# Patient Record
Sex: MALE | Race: WHITE | NOT HISPANIC OR LATINO | URBAN - METROPOLITAN AREA
[De-identification: names, ages, dates, MRNs, and addresses within clinical notes are randomized per-mention and may not be internally consistent; named-entity substitution may affect disease eponyms.]

---

## 2017-04-03 ENCOUNTER — OFFICE (AMBULATORY)
Dept: URBAN - METROPOLITAN AREA CLINIC 64 | Facility: CLINIC | Age: 52
End: 2017-04-03

## 2017-04-03 VITALS
WEIGHT: 173 LBS | DIASTOLIC BLOOD PRESSURE: 71 MMHG | SYSTOLIC BLOOD PRESSURE: 112 MMHG | HEIGHT: 68 IN | HEART RATE: 67 BPM

## 2017-04-03 DIAGNOSIS — K62.9 DISEASE OF ANUS AND RECTUM, UNSPECIFIED: ICD-10-CM

## 2017-04-03 DIAGNOSIS — Z12.11 ENCOUNTER FOR SCREENING FOR MALIGNANT NEOPLASM OF COLON: ICD-10-CM

## 2017-04-03 DIAGNOSIS — R13.10 DYSPHAGIA, UNSPECIFIED: ICD-10-CM

## 2017-04-03 PROCEDURE — 99203 OFFICE O/P NEW LOW 30 MIN: CPT | Performed by: INTERNAL MEDICINE

## 2017-05-01 ENCOUNTER — ON CAMPUS - OUTPATIENT (AMBULATORY)
Dept: URBAN - METROPOLITAN AREA HOSPITAL 2 | Facility: HOSPITAL | Age: 52
End: 2017-05-01
Payer: COMMERCIAL

## 2017-05-01 ENCOUNTER — OFFICE (AMBULATORY)
Dept: URBAN - METROPOLITAN AREA CLINIC 64 | Facility: CLINIC | Age: 52
End: 2017-05-01

## 2017-05-01 VITALS
SYSTOLIC BLOOD PRESSURE: 96 MMHG | SYSTOLIC BLOOD PRESSURE: 103 MMHG | HEART RATE: 74 BPM | HEART RATE: 81 BPM | HEART RATE: 78 BPM | HEIGHT: 68 IN | DIASTOLIC BLOOD PRESSURE: 68 MMHG | OXYGEN SATURATION: 100 % | DIASTOLIC BLOOD PRESSURE: 54 MMHG | DIASTOLIC BLOOD PRESSURE: 61 MMHG | DIASTOLIC BLOOD PRESSURE: 56 MMHG | SYSTOLIC BLOOD PRESSURE: 114 MMHG | DIASTOLIC BLOOD PRESSURE: 58 MMHG | HEART RATE: 71 BPM | OXYGEN SATURATION: 99 % | SYSTOLIC BLOOD PRESSURE: 95 MMHG | WEIGHT: 171 LBS | HEART RATE: 66 BPM | SYSTOLIC BLOOD PRESSURE: 126 MMHG | SYSTOLIC BLOOD PRESSURE: 109 MMHG | TEMPERATURE: 97.5 F | DIASTOLIC BLOOD PRESSURE: 59 MMHG | SYSTOLIC BLOOD PRESSURE: 98 MMHG | OXYGEN SATURATION: 95 % | SYSTOLIC BLOOD PRESSURE: 105 MMHG | HEART RATE: 59 BPM | HEART RATE: 76 BPM | HEART RATE: 64 BPM | DIASTOLIC BLOOD PRESSURE: 64 MMHG | RESPIRATION RATE: 16 BRPM | DIASTOLIC BLOOD PRESSURE: 78 MMHG | SYSTOLIC BLOOD PRESSURE: 106 MMHG | OXYGEN SATURATION: 94 %

## 2017-05-01 DIAGNOSIS — D12.3 BENIGN NEOPLASM OF TRANSVERSE COLON: ICD-10-CM

## 2017-05-01 DIAGNOSIS — K64.4 RESIDUAL HEMORRHOIDAL SKIN TAGS: ICD-10-CM

## 2017-05-01 DIAGNOSIS — R13.10 DYSPHAGIA, UNSPECIFIED: ICD-10-CM

## 2017-05-01 DIAGNOSIS — Z12.11 ENCOUNTER FOR SCREENING FOR MALIGNANT NEOPLASM OF COLON: ICD-10-CM

## 2017-05-01 DIAGNOSIS — K31.89 OTHER DISEASES OF STOMACH AND DUODENUM: ICD-10-CM

## 2017-05-01 DIAGNOSIS — K31.9 DISEASE OF STOMACH AND DUODENUM, UNSPECIFIED: ICD-10-CM

## 2017-05-01 DIAGNOSIS — K20.9 ESOPHAGITIS, UNSPECIFIED: ICD-10-CM

## 2017-05-01 PROBLEM — K29.70 GASTRITIS, UNSPECIFIED, WITHOUT BLEEDING: Status: ACTIVE | Noted: 2017-05-01

## 2017-05-01 PROBLEM — D12.4 BENIGN NEOPLASM OF DESCENDING COLON: Status: ACTIVE | Noted: 2017-05-01

## 2017-05-01 LAB
GI HISTOLOGY: A. SELECT: (no result)
GI HISTOLOGY: B. UNSPECIFIED: (no result)
GI HISTOLOGY: PDF REPORT: (no result)

## 2017-05-01 PROCEDURE — 43450 DILATE ESOPHAGUS 1/MULT PASS: CPT | Performed by: INTERNAL MEDICINE

## 2017-05-01 PROCEDURE — 88305 TISSUE EXAM BY PATHOLOGIST: CPT | Performed by: INTERNAL MEDICINE

## 2017-05-01 PROCEDURE — 43239 EGD BIOPSY SINGLE/MULTIPLE: CPT | Performed by: INTERNAL MEDICINE

## 2017-05-01 PROCEDURE — 45385 COLONOSCOPY W/LESION REMOVAL: CPT | Mod: 33 | Performed by: INTERNAL MEDICINE

## 2017-05-01 RX ORDER — OMEPRAZOLE 40 MG/1
40 CAPSULE, DELAYED RELEASE ORAL
Qty: 28 | Refills: 3 | Status: ACTIVE
Start: 2017-05-01

## 2017-05-01 RX ADMIN — PROPOFOL: 10 INJECTION, EMULSION INTRAVENOUS at 10:57

## 2017-08-29 ENCOUNTER — HOSPITAL ENCOUNTER (OUTPATIENT)
Dept: PREADMISSION TESTING | Facility: HOSPITAL | Age: 52
Discharge: HOME | End: 2017-08-29
Attending: ORTHOPAEDIC SURGERY | Admitting: ORTHOPAEDIC SURGERY

## 2017-08-29 LAB
ANION GAP SERPL CALC-SCNC: 11.9 MMOL/L (ref 10–20)
BACTERIA SPEC AEROBE CULT: NORMAL
BASOPHILS # BLD AUTO: 0 10*3/UL (ref 0–0.2)
BASOPHILS NFR BLD AUTO: 0 % (ref 0–2)
BUN SERPL-MCNC: 9 MG/DL (ref 8–20)
BUN/CREAT SERPL: 9 (ref 6.2–20.3)
CALCIUM SERPL-MCNC: 9.6 MG/DL (ref 8.9–10.3)
CHLORIDE SERPL-SCNC: 104 MMOL/L (ref 101–111)
CONV CO2: 27 MMOL/L (ref 22–32)
CREAT UR-MCNC: 1 MG/DL (ref 0.7–1.2)
DIFFERENTIAL METHOD BLD: (no result)
EOSINOPHIL # BLD AUTO: 0.1 10*3/UL (ref 0–0.3)
EOSINOPHIL # BLD AUTO: 2 % (ref 0–3)
ERYTHROCYTE [DISTWIDTH] IN BLOOD BY AUTOMATED COUNT: 12.7 % (ref 11.5–14.5)
GLUCOSE SERPL-MCNC: 121 MG/DL (ref 65–99)
HCT VFR BLD AUTO: 44 % (ref 40–54)
HGB BLD-MCNC: 15.2 G/DL (ref 14–18)
LYMPHOCYTES # BLD AUTO: 1.4 10*3/UL (ref 0.8–4.8)
LYMPHOCYTES NFR BLD AUTO: 26 % (ref 18–42)
Lab: NORMAL
MCH RBC QN AUTO: 33.2 PG (ref 26–32)
MCHC RBC AUTO-ENTMCNC: 34.5 G/DL (ref 32–36)
MCV RBC AUTO: 96.3 FL (ref 80–94)
MICRO REPORT STATUS: NORMAL
MONOCYTES # BLD AUTO: 0.5 10*3/UL (ref 0.1–1.3)
MONOCYTES NFR BLD AUTO: 10 % (ref 2–11)
NEUTROPHILS # BLD AUTO: 3.1 10*3/UL (ref 2.3–8.6)
NEUTROPHILS NFR BLD AUTO: 62 % (ref 50–75)
NRBC BLD AUTO-RTO: 0 /100{WBCS}
NRBC/RBC NFR BLD MANUAL: 0 10*3/UL
PLATELET # BLD AUTO: 184 10*3/UL (ref 150–450)
PMV BLD AUTO: 7.1 FL (ref 7.4–10.4)
POTASSIUM SERPL-SCNC: 3.9 MMOL/L (ref 3.6–5.1)
RBC # BLD AUTO: 4.57 10*6/UL (ref 4.6–6)
SODIUM SERPL-SCNC: 139 MMOL/L (ref 136–144)
SPECIMEN SOURCE: NORMAL
WBC # BLD AUTO: 5.1 10*3/UL (ref 4.5–11.5)

## 2017-09-05 ENCOUNTER — HOSPITAL ENCOUNTER (OUTPATIENT)
Dept: PREOP | Facility: HOSPITAL | Age: 52
Setting detail: HOSPITAL OUTPATIENT SURGERY
Discharge: HOME | End: 2017-09-05
Attending: ORTHOPAEDIC SURGERY | Admitting: ORTHOPAEDIC SURGERY

## 2018-08-10 ENCOUNTER — HOSPITAL ENCOUNTER (OUTPATIENT)
Dept: GENERAL RADIOLOGY | Facility: HOSPITAL | Age: 53
Discharge: HOME | End: 2018-08-10
Attending: FAMILY MEDICINE | Admitting: FAMILY MEDICINE

## 2021-01-08 ENCOUNTER — TELEPHONE (OUTPATIENT)
Dept: FAMILY MEDICINE CLINIC | Facility: CLINIC | Age: 56
End: 2021-01-08

## 2021-01-08 NOTE — TELEPHONE ENCOUNTER
Patient called and said that he was very desperate as he states that  he has a very inflamed Hemorid that is really bothering him.  Patient has not been seen since 4/2019 and I advised him that we would not be able to fill any scripts and or send inn any medications but I told him I would talk to li vallejo and see what he felt would be the best thing to do.     After talking to Dr. Vallejo he felt that him doing the Preparation H, doing a SITS bath ( warm to hot water with some Epson salt in it ) a few times a day and alternating sitting and standing while at work was going to be his bets options as he states that he has to sit for long periods of time while at work.     He said that he was leaving work now to go get this stuff and go home and try and rest. I told him to try and keep as much pressure off his bottom as he could and I told him that if it got to the point that he could not tolerate it anymore then to go to the urgent care and or ER.

## 2021-01-11 ENCOUNTER — OFFICE VISIT (OUTPATIENT)
Dept: FAMILY MEDICINE CLINIC | Facility: CLINIC | Age: 56
End: 2021-01-11

## 2021-01-11 VITALS
TEMPERATURE: 98.2 F | BODY MASS INDEX: 28.22 KG/M2 | WEIGHT: 186.2 LBS | OXYGEN SATURATION: 99 % | SYSTOLIC BLOOD PRESSURE: 110 MMHG | DIASTOLIC BLOOD PRESSURE: 62 MMHG | HEART RATE: 78 BPM | RESPIRATION RATE: 16 BRPM | HEIGHT: 68 IN

## 2021-01-11 DIAGNOSIS — K64.9 HEMORRHOIDS, UNSPECIFIED HEMORRHOID TYPE: Primary | ICD-10-CM

## 2021-01-11 DIAGNOSIS — E66.3 OVERWEIGHT WITH BODY MASS INDEX (BMI) OF 28 TO 28.9 IN ADULT: ICD-10-CM

## 2021-01-11 PROBLEM — Z47.89 ORTHOPEDIC AFTERCARE: Status: ACTIVE | Noted: 2017-09-07

## 2021-01-11 PROBLEM — M75.100 NONTRAUMATIC TEAR OF ROTATOR CUFF: Status: ACTIVE | Noted: 2017-04-26

## 2021-01-11 PROBLEM — M19.011 DEGENERATIVE JOINT DISEASE OF RIGHT ACROMIOCLAVICULAR JOINT: Status: ACTIVE | Noted: 2017-04-26

## 2021-01-11 PROBLEM — S83.242D OTHER TEAR OF MEDIAL MENISCUS, CURRENT INJURY, LEFT KNEE, SUBSEQUENT ENCOUNTER: Status: ACTIVE | Noted: 2017-08-07

## 2021-01-11 PROCEDURE — 99213 OFFICE O/P EST LOW 20 MIN: CPT | Performed by: FAMILY MEDICINE

## 2021-01-11 RX ORDER — HYDROCORTISONE ACETATE PRAMOXINE HCL 2.5; 1 G/100G; G/100G
CREAM TOPICAL 3 TIMES DAILY
Qty: 30 G | Refills: 2 | Status: SHIPPED | OUTPATIENT
Start: 2021-01-11 | End: 2021-05-03

## 2021-01-11 NOTE — PROGRESS NOTES
Subjective   Jean-Claude Clifford Jr. is a 55 y.o. male.     Chief Complaint   Patient presents with   • Hemorrhoids       Patient states he's had a hemorrhoid for about four years but since last Thursday he's noticed it's grown larger and has become more irritated.  He denies any blood in stools.    Hemorrhoids  This is a chronic problem. The current episode started more than 1 year ago. The problem has been rapidly worsening. Associated symptoms include a change in bowel habit. Pertinent negatives include no abdominal pain, chest pain, chills, diaphoresis, nausea, neck pain, rash or visual change. The symptoms are aggravated by standing, walking, bending and coughing. He has tried rest and relaxation for the symptoms. The treatment provided no relief.            I personally reviewed and updated the patient's allergies, medications, problem list, and past medical, surgical, social, and family history. I have reviewed and confirmed the accuracy of the History of Present Illness and Review of Symptoms as documented by the MA/LPN/RN. Adam Sanchez MD    History reviewed. No pertinent family history.    Social History     Tobacco Use   • Smoking status: Never Smoker   • Smokeless tobacco: Never Used   Substance Use Topics   • Alcohol use: No     Frequency: Never   • Drug use: Not on file       History reviewed. No pertinent surgical history.    Patient Active Problem List   Diagnosis   • Degenerative joint disease of right acromioclavicular joint   • Nontraumatic tear of rotator cuff   • Other tear of medial meniscus, current injury, left knee, subsequent encounter   • Orthopedic aftercare   • Hemorrhoids   • Overweight with body mass index (BMI) of 28 to 28.9 in adult         Current Outpatient Medications:   •  Multiple Vitamins-Minerals (MULTIVITAMIN ADULTS 50+ PO), Take  by mouth., Disp: , Rfl:   •  Hydrocort-Pramoxine, Perianal, (ANALPRAM-HC) 2.5-1 % rectal cream, Insert  into the rectum 3 (Three) Times a Day.  "2-4 times daily, as needed or after each Bowel Movement, Disp: 30 g, Rfl: 2         Review of Systems   Constitutional: Negative for chills and diaphoresis.   Eyes: Negative for visual disturbance.   Respiratory: Negative for shortness of breath.    Cardiovascular: Negative for chest pain and palpitations.   Gastrointestinal: Positive for change in bowel habit and hemorrhoids. Negative for abdominal pain and nausea.   Endocrine: Negative for polydipsia and polyphagia.   Musculoskeletal: Negative for neck pain and neck stiffness.   Skin: Negative for color change, pallor and rash.   Neurological: Negative for seizures and syncope.   Hematological: Negative for adenopathy.       I have reviewed and confirmed the accuracy of the ROS as documented by the MA/LPN/RN Adam Sanchez MD      Objective   /62   Pulse 78   Temp 98.2 °F (36.8 °C)   Resp 16   Ht 172.7 cm (68\")   Wt 84.5 kg (186 lb 3.2 oz)   SpO2 99%   BMI 28.31 kg/m²   BP Readings from Last 3 Encounters:   01/11/21 110/62   10/25/19 153/73   02/28/19 143/88     Wt Readings from Last 3 Encounters:   01/11/21 84.5 kg (186 lb 3.2 oz)   10/25/19 79.4 kg (175 lb)   02/28/19 86.4 kg (190 lb 6.4 oz)     Physical Exam  Constitutional:       Appearance: Normal appearance. He is well-developed. He is not diaphoretic.   Cardiovascular:      Rate and Rhythm: Normal rate and regular rhythm.      Pulses: Normal pulses.      Heart sounds: Normal heart sounds, S1 normal and S2 normal. No murmur. No friction rub. No gallop.    Pulmonary:      Effort: Pulmonary effort is normal. No accessory muscle usage.      Breath sounds: Normal breath sounds. No stridor. No decreased breath sounds, wheezing, rhonchi or rales.   Abdominal:      General: Bowel sounds are normal. There is no distension.      Palpations: Abdomen is soft. Abdomen is not rigid. There is no mass or pulsatile mass.      Tenderness: There is no abdominal tenderness. There is no guarding or rebound. " Negative signs include Dyer's sign.      Hernia: No hernia is present. There is no hernia in the left inguinal area.   Genitourinary:     Penis: Normal.       Scrotum/Testes: Normal.         Right: Mass, tenderness or swelling not present.         Left: Mass, tenderness or swelling not present.      Prostate: Enlarged: mild, symmetric, no nodules.      Rectum: External hemorrhoid present. No mass, tenderness or anal fissure.   Lymphadenopathy:      Lower Body: No right inguinal adenopathy. No left inguinal adenopathy.   Skin:     General: Skin is warm and dry.      Coloration: Skin is not pale.   Neurological:      Mental Status: He is alert and oriented to person, place, and time.      Coordination: Coordination normal.      Gait: Gait normal.         Data / Lab Results:    No results found for: HGBA1C     No results found for: LDL, LDLDIRECT  No results found for: CHOL  No results found for: TRIG  No results found for: HDL  No results found for: PSA  Lab Results   Component Value Date    WBC 5.1 08/29/2017    HGB 15.2 08/29/2017    HCT 44.0 08/29/2017    MCV 96.3 (H) 08/29/2017     08/29/2017     No results found for: TSH, M6DHAOJ, I5APHJA   Lab Results   Component Value Date    GLUCOSE 121 (H) 08/29/2017    BUN 9 08/29/2017    CREATININE 1.0 08/29/2017    BCR 9.0 08/29/2017    K 3.9 08/29/2017    CO2 27 08/29/2017    CALCIUM 9.6 08/29/2017     No results found for: MALU, RF, SEDRATE   No results found for: CRP   No results found for: IRON, TIBC, FERRITIN   No results found for: [id number]       Assessment/Plan      Medications        Problem List         LOS    Hemorrhoid.  Start topical hydrocortisone, sitz bath/Tucks pads, NSAIDs as needed.  Coccyx pillow as needed.  Topical care discussed.  Longstanding history, consider colorectal surgery referral if persistent symptoms.  Increase fluids/fiber.      Diagnoses and all orders for this visit:    1. Hemorrhoids, unspecified hemorrhoid type (Primary)  -      Hydrocort-Pramoxine, Perianal, (ANALPRAM-HC) 2.5-1 % rectal cream; Insert  into the rectum 3 (Three) Times a Day. 2-4 times daily, as needed or after each Bowel Movement  Dispense: 30 g; Refill: 2    2. Overweight with body mass index (BMI) of 28 to 28.9 in adult              Expected course, medications, and adverse effects discussed.  Call or return if worsening or persistent symptoms.  I wore protective equipment throughout this patient encounter including a mask, gloves, and eye protection.  Hand hygiene was performed before donning protective equipment and after removal when leaving the room. The complete contents of the Assessment and Plan and Data/Lab Results as documented above have been reviewed and addressed by myself with the patient today as part of an ongoing evaluation / treatment plan.  If some of the documentation has been copied from a previous note and is unchanged it indicates that this problem / plan has been assessed today but is stable from a previous visit and no changes have been recommended.

## 2021-02-19 ENCOUNTER — OFFICE VISIT (OUTPATIENT)
Dept: FAMILY MEDICINE CLINIC | Facility: CLINIC | Age: 56
End: 2021-02-19

## 2021-02-19 ENCOUNTER — HOSPITAL ENCOUNTER (OUTPATIENT)
Dept: GENERAL RADIOLOGY | Facility: HOSPITAL | Age: 56
Discharge: HOME | End: 2021-02-19
Admitting: FAMILY MEDICINE

## 2021-02-19 ENCOUNTER — TELEPHONE (OUTPATIENT)
Dept: FAMILY MEDICINE CLINIC | Facility: CLINIC | Age: 56
End: 2021-02-19

## 2021-02-19 VITALS
HEART RATE: 82 BPM | DIASTOLIC BLOOD PRESSURE: 77 MMHG | HEIGHT: 68 IN | RESPIRATION RATE: 20 BRPM | OXYGEN SATURATION: 97 % | BODY MASS INDEX: 28.4 KG/M2 | SYSTOLIC BLOOD PRESSURE: 127 MMHG | WEIGHT: 187.4 LBS | TEMPERATURE: 97.1 F

## 2021-02-19 DIAGNOSIS — K64.9 HEMORRHOIDS, UNSPECIFIED HEMORRHOID TYPE: ICD-10-CM

## 2021-02-19 DIAGNOSIS — R06.02 SHORT OF BREATH ON EXERTION: Primary | ICD-10-CM

## 2021-02-19 DIAGNOSIS — L57.0 ACTINIC KERATOSIS: ICD-10-CM

## 2021-02-19 DIAGNOSIS — R07.9 CHEST PAIN, UNSPECIFIED TYPE: ICD-10-CM

## 2021-02-19 DIAGNOSIS — E66.3 OVERWEIGHT WITH BODY MASS INDEX (BMI) OF 28 TO 28.9 IN ADULT: ICD-10-CM

## 2021-02-19 PROCEDURE — 93000 ELECTROCARDIOGRAM COMPLETE: CPT | Performed by: FAMILY MEDICINE

## 2021-02-19 PROCEDURE — 99215 OFFICE O/P EST HI 40 MIN: CPT | Performed by: FAMILY MEDICINE

## 2021-02-19 PROCEDURE — 71046 X-RAY EXAM CHEST 2 VIEWS: CPT

## 2021-02-19 NOTE — TELEPHONE ENCOUNTER
Let him know his blood work looks good, his heart troponin is normal no sign of a heart attack, blood count and kidney function look good, chest x-ray also normal, will call again with the results of his Covid swab when the results are available, want him to proceed with the stress test next week as planned, thanks

## 2021-02-19 NOTE — PROGRESS NOTES
Subjective   Jean-Claude Clifford Jr. is a 55 y.o. male.     Chief Complaint   Patient presents with   • Shortness of Breath       Patient states when he walks a short distance he gets a tightness in his chest.    Shortness of Breath  This is a new problem. The current episode started 1 to 4 weeks ago. The problem occurs intermittently. Associated symptoms include chest pain and headaches. Pertinent negatives include no abdominal pain, fever, leg pain, neck pain, sore throat or vomiting. The symptoms are aggravated by exercise. He has tried nothing for the symptoms. The treatment provided no relief.            I personally reviewed and updated the patient's allergies, medications, problem list, and past medical, surgical, social, and family history. I have reviewed and confirmed the accuracy of the History of Present Illness and Review of Symptoms as documented by the MA/LPN/RN. Adam Sanchez MD    History reviewed. No pertinent family history.    Social History     Tobacco Use   • Smoking status: Never Smoker   • Smokeless tobacco: Never Used   Substance Use Topics   • Alcohol use: No     Frequency: Never   • Drug use: Never       History reviewed. No pertinent surgical history.    Patient Active Problem List   Diagnosis   • Degenerative joint disease of right acromioclavicular joint   • Nontraumatic tear of rotator cuff   • Other tear of medial meniscus, current injury, left knee, subsequent encounter   • Orthopedic aftercare   • Hemorrhoids   • Overweight with body mass index (BMI) of 28 to 28.9 in adult   • Short of breath on exertion   • Actinic keratosis         Current Outpatient Medications:   •  Hydrocort-Pramoxine, Perianal, (ANALPRAM-HC) 2.5-1 % rectal cream, Insert  into the rectum 3 (Three) Times a Day. 2-4 times daily, as needed or after each Bowel Movement, Disp: 30 g, Rfl: 2  •  Multiple Vitamins-Minerals (MULTIVITAMIN ADULTS 50+ PO), Take  by mouth., Disp: , Rfl:          Review of Systems  "  Constitutional: Negative for chills, diaphoresis and fever.   HENT: Negative for sore throat, trouble swallowing and voice change.    Eyes: Negative for visual disturbance.   Respiratory: Positive for shortness of breath.    Cardiovascular: Positive for chest pain. Negative for palpitations.   Gastrointestinal: Negative for abdominal pain, nausea and vomiting.   Endocrine: Negative for polydipsia and polyphagia.   Genitourinary: Negative for hematuria.   Musculoskeletal: Negative for neck pain and neck stiffness.   Skin: Negative for color change and pallor.   Allergic/Immunologic: Negative for immunocompromised state.   Neurological: Negative for seizures and syncope.   Hematological: Negative for adenopathy.   Psychiatric/Behavioral: Negative for hallucinations, sleep disturbance and suicidal ideas.       I have reviewed and confirmed the accuracy of the ROS as documented by the MA/LPN/RN Adam Sanchez MD      Objective   /77 (BP Location: Right arm, Patient Position: Sitting, Cuff Size: Adult)   Pulse 82   Temp 97.1 °F (36.2 °C)   Resp 20   Ht 172.7 cm (68\")   Wt 85 kg (187 lb 6.4 oz)   SpO2 97%   BMI 28.49 kg/m²   BP Readings from Last 3 Encounters:   02/19/21 127/77   01/11/21 110/62   10/25/19 153/73     Wt Readings from Last 3 Encounters:   02/19/21 85 kg (187 lb 6.4 oz)   01/11/21 84.5 kg (186 lb 3.2 oz)   10/25/19 79.4 kg (175 lb)     Physical Exam  Constitutional:       Appearance: Normal appearance. He is well-developed. He is not diaphoretic.   Cardiovascular:      Rate and Rhythm: Normal rate and regular rhythm.      Pulses: Normal pulses.      Heart sounds: Normal heart sounds, S1 normal and S2 normal. No murmur. No friction rub. No gallop.    Pulmonary:      Effort: Pulmonary effort is normal. No accessory muscle usage.      Breath sounds: Normal breath sounds. No stridor. No decreased breath sounds, wheezing, rhonchi or rales.   Abdominal:      General: Bowel sounds are normal. There " is no distension.      Palpations: Abdomen is soft. Abdomen is not rigid. There is no mass or pulsatile mass.      Tenderness: There is no abdominal tenderness. There is no guarding or rebound. Negative signs include Dyer's sign.      Hernia: No hernia is present.   Skin:     General: Skin is warm and dry.      Coloration: Skin is not pale.      Comments: Keratotic lesion right chest wall consistent with AK   Neurological:      Mental Status: He is alert and oriented to person, place, and time.      Coordination: Coordination normal.      Gait: Gait normal.         Data / Lab Results:    No results found for: HGBA1C     No results found for: LDL, LDLDIRECT  No results found for: CHOL  No results found for: TRIG  No results found for: HDL  No results found for: PSA  Lab Results   Component Value Date    WBC 5.1 08/29/2017    HGB 15.2 08/29/2017    HCT 44.0 08/29/2017    MCV 96.3 (H) 08/29/2017     08/29/2017     No results found for: TSH, F5SISXG, O5BYOGR   Lab Results   Component Value Date    GLUCOSE 121 (H) 08/29/2017    BUN 9 08/29/2017    CREATININE 1.0 08/29/2017    BCR 9.0 08/29/2017    K 3.9 08/29/2017    CO2 27 08/29/2017    CALCIUM 9.6 08/29/2017     No results found for: MALU, RF, SEDRATE   No results found for: CRP   No results found for: IRON, TIBC, FERRITIN   No results found for: [id number]       Assessment/Plan      Medications        Problem List         LOS    Chest pain.  Exertional, with shortness of breath.  DDx includes angina, infection, GERD, stress/anxiety.  EKG with flipped T's inferior leads today, no old for comparison.  Stat troponin, pro BNP normal today.  Cardiac stress testing scheduled.  Covid swab pending start quarantine.  Chest x-ray benign today.  Follow-up recheck.  Consider PPI Rx, SSRI if persistent symptoms.  Call or go to the emergency department if chest pain on exertion or worsening symptoms or  GERD.  Clinically improved currently, off Rx.  History of EGD with  dilation.  Will get records.  Skin lesion chest.  Possible AK.  Dermatology referral scheduled.  Hemorrhoids.  Clinically improved.  Colorectal surgery referral scheduled.      Diagnoses and all orders for this visit:    1. Short of breath on exertion (Primary)  -     Cancel: COVID-19,[facility] ROUTINE, NP/OP SWAB IN TRANSPORT MEDIA OR ESWAB 72 HR TAT - Swab, Nasopharynx; Future  -     COVID-19,[facility] ROUTINE, NP/OP SWAB IN TRANSPORT MEDIA OR ESWAB 72 HR TAT - Swab, Nasopharynx  -     XR Chest PA & Lateral    2. Chest pain, unspecified type  -     XR Chest PA & Lateral  -     CBC & Differential; Future  -     Basic metabolic panel; Future  -     proBNP; Future  -     Troponin I; Future  -     CBC & Differential  -     Basic metabolic panel  -     Troponin I  -     proBNP  -     Rhythm Ecg, Report  -     Adult Stress Echo W/ Cont or Stress Agent if Necessary Per Protocol; Future    3. Overweight with body mass index (BMI) of 28 to 28.9 in adult    4. Actinic keratosis    5. Hemorrhoids, unspecified hemorrhoid type              Expected course, medications, and adverse effects discussed.  Call or return if worsening or persistent symptoms.  I wore protective equipment throughout this patient encounter including a mask, gloves, and eye protection.  Hand hygiene was performed before donning protective equipment and after removal when leaving the room. The complete contents of the Assessment and Plan and Data/Lab Results as documented above have been reviewed and addressed by myself with the patient today as part of an ongoing evaluation / treatment plan.  If some of the documentation has been copied from a previous note and is unchanged it indicates that this problem / plan has been assessed today but is stable from a previous visit and no changes have been recommended.

## 2021-02-20 LAB — SARS-COV-2 RNA RESP QL NAA+PROBE: NOT DETECTED

## 2021-02-22 ENCOUNTER — TELEPHONE (OUTPATIENT)
Dept: FAMILY MEDICINE CLINIC | Facility: CLINIC | Age: 56
End: 2021-02-22

## 2021-02-22 NOTE — TELEPHONE ENCOUNTER
----- Message from Adam Sanchez MD sent at 2/21/2021  8:16 AM EST -----  Good news your COVID-19 test is negative, thanks

## 2021-02-23 ENCOUNTER — TELEPHONE (OUTPATIENT)
Dept: FAMILY MEDICINE CLINIC | Facility: CLINIC | Age: 56
End: 2021-02-23

## 2021-02-23 NOTE — TELEPHONE ENCOUNTER
----- Message from Jean-Claude Clifford Jr. sent at 2/23/2021  8:20 AM EST -----  Regarding: Visit Follow-Up Question  Contact: [phone]  Paul Barber called yesterday and told me I had to pay over [phone] to have the stress test. According to Doc, he wanted it done downstairs so he could come down... not sure why they even scheduled at [place].  Need to know if there is another option.

## 2021-02-24 NOTE — TELEPHONE ENCOUNTER
Confirm that his stress test is scheduled downstairs?  Sorry to hear that it is so expensive, could be that it is hitting his deductible.  Would still recommend proceeding with the test to evaluate his heart can be sure he does not have a blockage.  Another option would be to see cardiology to get an opinion, could see Dr. Taylor.

## 2021-02-26 ENCOUNTER — APPOINTMENT (OUTPATIENT)
Dept: CARDIOLOGY | Facility: HOSPITAL | Age: 56
End: 2021-02-26

## 2021-02-26 ENCOUNTER — OFFICE VISIT (OUTPATIENT)
Dept: FAMILY MEDICINE CLINIC | Facility: CLINIC | Age: 56
End: 2021-02-26

## 2021-02-26 ENCOUNTER — E-VISIT (OUTPATIENT)
Dept: FAMILY MEDICINE CLINIC | Facility: CLINIC | Age: 56
End: 2021-02-26

## 2021-02-26 ENCOUNTER — TELEPHONE (OUTPATIENT)
Dept: FAMILY MEDICINE CLINIC | Facility: CLINIC | Age: 56
End: 2021-02-26

## 2021-02-26 DIAGNOSIS — R05.9 COUGH: Primary | ICD-10-CM

## 2021-02-26 DIAGNOSIS — R07.9 CHEST PAIN, UNSPECIFIED TYPE: ICD-10-CM

## 2021-02-26 DIAGNOSIS — E66.3 OVERWEIGHT WITH BODY MASS INDEX (BMI) OF 28 TO 28.9 IN ADULT: ICD-10-CM

## 2021-02-26 DIAGNOSIS — R06.02 SHORT OF BREATH ON EXERTION: ICD-10-CM

## 2021-02-26 DIAGNOSIS — R06.02 SHORT OF BREATH ON EXERTION: Primary | ICD-10-CM

## 2021-02-26 PROCEDURE — 99443 PR PHYS/QHP TELEPHONE EVALUATION 21-30 MIN: CPT | Performed by: FAMILY MEDICINE

## 2021-02-26 RX ORDER — CIPROFLOXACIN 500 MG/1
500 TABLET, FILM COATED ORAL 2 TIMES DAILY
Qty: 20 TABLET | Refills: 0 | Status: SHIPPED | OUTPATIENT
Start: 2021-02-26 | End: 2021-03-12

## 2021-02-26 RX ORDER — PREDNISONE 1 MG/1
TABLET ORAL
Qty: 45 TABLET | Refills: 0 | Status: SHIPPED | OUTPATIENT
Start: 2021-02-26 | End: 2021-03-12

## 2021-02-26 NOTE — TELEPHONE ENCOUNTER
----- Message from Jean-Claude Clifford Jr. sent at 2/24/2021  5:41 PM EST -----  Regarding: RE:stress test  Contact: [phone]  7300 dollars is just silly. [employer] has always paid, it's just that they are a [employer] program. They have always paid [employer], so who knows. I guess I should see the Cardiologist, because I feel the same...    My deductible is only 2,500.00 per family per year.

## 2021-02-26 NOTE — PROGRESS NOTES
Subjective   Jean-Claude Clifford Jr. is a 55 y.o. male.     Chief Complaint   Patient presents with   • Cough   • Shortness of Breath       Shortness of Breath  This is a new problem. The current episode started 1 to 4 weeks ago. The problem occurs intermittently. Associated symptoms include chest pain and headaches. Pertinent negatives include no abdominal pain, fever, leg pain, neck pain, sore throat or vomiting. The symptoms are aggravated by exercise. He has tried nothing for the symptoms. The treatment provided no relief.   Cough  This is a new problem. The current episode started 1 to 4 weeks ago. The problem has been gradually worsening. The cough is productive of sputum. Associated symptoms include chest pain, headaches and shortness of breath. Pertinent negatives include no chills, fever or sore throat.            I personally reviewed and updated the patient's allergies, medications, problem list, and past medical, surgical, social, and family history. I have reviewed and confirmed the accuracy of the History of Present Illness and Review of Symptoms as documented by the MA/LPN/RN. Adam Sanchez MD    History reviewed. No pertinent family history.    Social History     Tobacco Use   • Smoking status: Never Smoker   • Smokeless tobacco: Never Used   Substance Use Topics   • Alcohol use: No     Frequency: Never   • Drug use: Never       History reviewed. No pertinent surgical history.    Patient Active Problem List   Diagnosis   • Degenerative joint disease of right acromioclavicular joint   • Nontraumatic tear of rotator cuff   • Other tear of medial meniscus, current injury, left knee, subsequent encounter   • Orthopedic aftercare   • Hemorrhoids   • Overweight with body mass index (BMI) of 28 to 28.9 in adult   • Short of breath on exertion   • Actinic keratosis         Current Outpatient Medications:   •  Hydrocort-Pramoxine, Perianal, (ANALPRAM-HC) 2.5-1 % rectal cream, Insert  into the rectum 3  (Three) Times a Day. 2-4 times daily, as needed or after each Bowel Movement, Disp: 30 g, Rfl: 2  •  Multiple Vitamins-Minerals (MULTIVITAMIN ADULTS 50+ PO), Take  by mouth., Disp: , Rfl:   •  ciprofloxacin (CIPRO) 500 MG tablet, Take 1 tablet by mouth 2 (Two) Times a Day., Disp: 20 tablet, Rfl: 0  •  predniSONE (DELTASONE) 5 MG tablet, 40mg x 3 days, 20mg x 3 days, 10mg x 3 days, 5mg x 3 days, Disp: 45 tablet, Rfl: 0         Review of Systems   Constitutional: Negative for chills, diaphoresis and fever.   HENT: Negative for sore throat.    Eyes: Negative for visual disturbance.   Respiratory: Positive for cough and shortness of breath.    Cardiovascular: Positive for chest pain. Negative for palpitations.   Gastrointestinal: Negative for abdominal pain, nausea and vomiting.   Endocrine: Negative for polydipsia and polyphagia.   Musculoskeletal: Negative for neck pain and neck stiffness.   Skin: Negative for color change and pallor.   Neurological: Negative for seizures and syncope.   Hematological: Negative for adenopathy.       I have reviewed and confirmed the accuracy of the ROS as documented by the MA/LPN/RN Adam Sanchez MD      Objective   There were no vitals taken for this visit.  BP Readings from Last 3 Encounters:   02/19/21 127/77   01/11/21 110/62   10/25/19 153/73     Wt Readings from Last 3 Encounters:   02/19/21 85 kg (187 lb 6.4 oz)   01/11/21 84.5 kg (186 lb 3.2 oz)   10/25/19 79.4 kg (175 lb)     Physical Exam    Data / Lab Results:    No results found for: HGBA1C     No results found for: LDL, LDLDIRECT  No results found for: CHOL  No results found for: TRIG  No results found for: HDL  No results found for: PSA  Lab Results   Component Value Date    WBC 5.1 08/29/2017    HGB 15.2 08/29/2017    HCT 44.0 08/29/2017    MCV 96.3 (H) 08/29/2017     08/29/2017     No results found for: TSH, O3VELLO, S5GCWNJ   Lab Results   Component Value Date    GLUCOSE 121 (H) 08/29/2017    BUN 9 08/29/2017     CREATININE 1.0 08/29/2017    BCR 9.0 08/29/2017    K 3.9 08/29/2017    CO2 27 08/29/2017    CALCIUM 9.6 08/29/2017     No results found for: MALU, RF, SEDRATE   No results found for: CRP   No results found for: IRON, TIBC, FERRITIN   No results found for: [id number]     Jean-Claude Candelariodnconner Elliott. consented to undergoing telephone visit today.  This format was necessitated by the current COVID-19 viral pandemic. 23 minutes was spent on the phone today with Brendan discussing his acute concerns and chronic medical problems.    Assessment/Plan      Medications        Problem List         LOS    Cough/wheezing.  DDx includes bronchitis, start antibiotics/prednisone.  Increase fluid intake/rest.  Chest x-ray negative/COVID-19 swab negative.  Follow-up recheck.  Call return if fever worsening symptoms.  Chest pain.  Exertional, with shortness of breath.  DDx includes angina, infection, GERD, stress/anxiety.  EKG with flipped T's inferior leads today, no old for comparison.  Stat troponin, pro BNP normal today.  Cardiac stress testing recommended, he declined secondary to cost, cardiology referral scheduled..  Covid swab negative. Chest x-ray benign.  Follow-up recheck.  Consider PPI Rx, SSRI if persistent symptoms.  Call or go to the emergency department if chest pain on exertion or worsening symptoms or  GERD.  Clinically improved currently, off Rx.  History of EGD with dilation.  Will get records.  Skin lesion chest.  Possible AK.  Dermatology referral scheduled.  Hemorrhoids.  Clinically improved.  Colorectal surgery referral scheduled.        Diagnoses and all orders for this visit:    1. Cough (Primary)    2. Short of breath on exertion    3. Overweight with body mass index (BMI) of 28 to 28.9 in adult    Other orders  -     ciprofloxacin (CIPRO) 500 MG tablet; Take 1 tablet by mouth 2 (Two) Times a Day.  Dispense: 20 tablet; Refill: 0  -     predniSONE (DELTASONE) 5 MG tablet; 40mg x 3 days, 20mg x 3 days, 10mg x 3  days, 5mg x 3 days  Dispense: 45 tablet; Refill: 0              Expected course, medications, and adverse effects discussed.  Call or return if worsening or persistent symptoms.  I wore protective equipment throughout this patient encounter including a mask, gloves, and eye protection.  Hand hygiene was performed before donning protective equipment and after removal when leaving the room. The complete contents of the Assessment and Plan and Data/Lab Results as documented above have been reviewed and addressed by myself with the patient today as part of an ongoing evaluation / treatment plan.  If some of the documentation has been copied from a previous note and is unchanged it indicates that this problem / plan has been assessed today but is stable from a previous visit and no changes have been recommended.

## 2021-02-28 ENCOUNTER — HOSPITAL ENCOUNTER (EMERGENCY)
Facility: HOSPITAL | Age: 56
Discharge: HOME | End: 2021-02-28
Attending: EMERGENCY MEDICINE | Admitting: EMERGENCY MEDICINE

## 2021-02-28 ENCOUNTER — APPOINTMENT (OUTPATIENT)
Dept: GENERAL RADIOLOGY | Facility: HOSPITAL | Age: 56
End: 2021-02-28

## 2021-02-28 VITALS
WEIGHT: 185.19 LBS | HEIGHT: 68 IN | DIASTOLIC BLOOD PRESSURE: 70 MMHG | HEART RATE: 78 BPM | OXYGEN SATURATION: 97 % | SYSTOLIC BLOOD PRESSURE: 125 MMHG | RESPIRATION RATE: 18 BRPM | BODY MASS INDEX: 28.07 KG/M2 | TEMPERATURE: 98.1 F

## 2021-02-28 DIAGNOSIS — R07.9 CHEST PAIN, UNSPECIFIED TYPE: Primary | ICD-10-CM

## 2021-02-28 LAB
ANION GAP SERPL CALCULATED.3IONS-SCNC: 13 MMOL/L (ref 5–15)
BASOPHILS # BLD AUTO: 0 10*3/MM3 (ref 0–0.2)
BASOPHILS NFR BLD AUTO: 0.4 % (ref 0–1.5)
BUN SERPL-MCNC: 12 MG/DL (ref 6–20)
BUN/CREAT SERPL: 10.8 (ref 7–25)
CALCIUM SPEC-SCNC: 9.3 MG/DL (ref 8.6–10.5)
CHLORIDE SERPL-SCNC: 101 MMOL/L (ref 98–107)
CO2 SERPL-SCNC: 25 MMOL/L (ref 22–29)
CREAT SERPL-MCNC: 1.11 MG/DL (ref 0.76–1.27)
DEPRECATED RDW RBC AUTO: 43.3 FL (ref 37–54)
EOSINOPHIL # BLD AUTO: 0 10*3/MM3 (ref 0–0.4)
EOSINOPHIL NFR BLD AUTO: 0 % (ref 0.3–6.2)
ERYTHROCYTE [DISTWIDTH] IN BLOOD BY AUTOMATED COUNT: 13.2 % (ref 12.3–15.4)
GFR SERPL CREATININE-BSD FRML MDRD: 68 ML/MIN/1.73
GLUCOSE SERPL-MCNC: 128 MG/DL (ref 65–99)
HCT VFR BLD AUTO: 44.8 % (ref 37.5–51)
HGB BLD-MCNC: 15.6 G/DL (ref 13–17.7)
HOLD SPECIMEN: NORMAL
HOLD SPECIMEN: NORMAL
LYMPHOCYTES # BLD AUTO: 1 10*3/MM3 (ref 0.7–3.1)
LYMPHOCYTES NFR BLD AUTO: 8.1 % (ref 19.6–45.3)
MCH RBC QN AUTO: 33.2 PG (ref 26.6–33)
MCHC RBC AUTO-ENTMCNC: 34.8 G/DL (ref 31.5–35.7)
MCV RBC AUTO: 95.4 FL (ref 79–97)
MONOCYTES # BLD AUTO: 0.6 10*3/MM3 (ref 0.1–0.9)
MONOCYTES NFR BLD AUTO: 4.7 % (ref 5–12)
NEUTROPHILS NFR BLD AUTO: 10.2 10*3/MM3 (ref 1.7–7)
NEUTROPHILS NFR BLD AUTO: 86.8 % (ref 42.7–76)
NRBC BLD AUTO-RTO: 0 /100 WBC (ref 0–0.2)
PLATELET # BLD AUTO: 230 10*3/MM3 (ref 140–450)
PMV BLD AUTO: 7.2 FL (ref 6–12)
POTASSIUM SERPL-SCNC: 3.9 MMOL/L (ref 3.5–5.2)
RBC # BLD AUTO: 4.7 10*6/MM3 (ref 4.14–5.8)
SARS-COV-2 RNA PNL SPEC NAA+PROBE: NORMAL
SODIUM SERPL-SCNC: 139 MMOL/L (ref 136–145)
TROPONIN T SERPL-MCNC: <0.01 NG/ML (ref 0–0.03)
WBC # BLD AUTO: 11.7 10*3/MM3 (ref 3.4–10.8)
WHOLE BLOOD HOLD SPECIMEN: NORMAL

## 2021-02-28 PROCEDURE — 85025 COMPLETE CBC W/AUTO DIFF WBC: CPT | Performed by: EMERGENCY MEDICINE

## 2021-02-28 PROCEDURE — 80048 BASIC METABOLIC PNL TOTAL CA: CPT | Performed by: EMERGENCY MEDICINE

## 2021-02-28 PROCEDURE — 93005 ELECTROCARDIOGRAM TRACING: CPT | Performed by: EMERGENCY MEDICINE

## 2021-02-28 PROCEDURE — 71045 X-RAY EXAM CHEST 1 VIEW: CPT

## 2021-02-28 PROCEDURE — 84484 ASSAY OF TROPONIN QUANT: CPT | Performed by: EMERGENCY MEDICINE

## 2021-02-28 PROCEDURE — 99284 EMERGENCY DEPT VISIT MOD MDM: CPT

## 2021-02-28 PROCEDURE — 87635 SARS-COV-2 COVID-19 AMP PRB: CPT | Performed by: EMERGENCY MEDICINE

## 2021-02-28 RX ORDER — NAPROXEN 375 MG/1
375 TABLET ORAL 2 TIMES DAILY PRN
Qty: 14 TABLET | Refills: 0 | Status: SHIPPED | OUTPATIENT
Start: 2021-02-28 | End: 2021-03-12

## 2021-02-28 RX ORDER — SODIUM CHLORIDE 0.9 % (FLUSH) 0.9 %
10 SYRINGE (ML) INJECTION AS NEEDED
Status: DISCONTINUED | OUTPATIENT
Start: 2021-02-28 | End: 2021-02-28 | Stop reason: HOSPADM

## 2021-03-02 ENCOUNTER — OFFICE VISIT (OUTPATIENT)
Dept: FAMILY MEDICINE CLINIC | Facility: CLINIC | Age: 56
End: 2021-03-02

## 2021-03-02 VITALS
SYSTOLIC BLOOD PRESSURE: 122 MMHG | DIASTOLIC BLOOD PRESSURE: 72 MMHG | TEMPERATURE: 97.5 F | OXYGEN SATURATION: 98 % | RESPIRATION RATE: 16 BRPM | BODY MASS INDEX: 28.28 KG/M2 | WEIGHT: 186.6 LBS | HEIGHT: 68 IN | HEART RATE: 86 BPM

## 2021-03-02 DIAGNOSIS — K21.9 GASTROESOPHAGEAL REFLUX DISEASE, UNSPECIFIED WHETHER ESOPHAGITIS PRESENT: ICD-10-CM

## 2021-03-02 DIAGNOSIS — E66.3 OVERWEIGHT WITH BODY MASS INDEX (BMI) OF 28 TO 28.9 IN ADULT: ICD-10-CM

## 2021-03-02 DIAGNOSIS — R06.02 SHORT OF BREATH ON EXERTION: Primary | ICD-10-CM

## 2021-03-02 DIAGNOSIS — R07.89 OTHER CHEST PAIN: ICD-10-CM

## 2021-03-02 LAB — QT INTERVAL: 372 MS

## 2021-03-02 PROCEDURE — 99213 OFFICE O/P EST LOW 20 MIN: CPT | Performed by: FAMILY MEDICINE

## 2021-03-02 RX ORDER — OMEPRAZOLE 40 MG/1
40 CAPSULE, DELAYED RELEASE ORAL DAILY
Qty: 30 CAPSULE | Refills: 5 | Status: SHIPPED | OUTPATIENT
Start: 2021-03-02 | End: 2021-03-29

## 2021-03-02 NOTE — PROGRESS NOTES
Subjective   Jean-Claude Clifford Jr. is a 55 y.o. male.     Chief Complaint   Patient presents with   • Chest Pain   • Shortness of Breath   • Cough       Jean-Claude was seen at [facility]  on 2/28/2021. He was seen for chest pain. Labs that were performed during the encounter included: CBC-elevated wbc,BMP-Elevated blood sugar, Covid-19 swab-negative, and Troponin-normal. Diagnostic studies that were performed included: Chest x-ray-normal. Medication changes: naproxen-375.  He's scheduled for follow up with cardiology Friday 3/12/21 in [place].    Shortness of Breath  This is a new problem. The current episode started 1 to 4 weeks ago. The problem occurs intermittently. Associated symptoms include chest pain and headaches. Pertinent negatives include no abdominal pain, fever, leg pain, neck pain, sore throat or vomiting. The symptoms are aggravated by exercise. He has tried nothing for the symptoms. The treatment provided no relief.   Cough  This is a new problem. The current episode started 1 to 4 weeks ago. The problem has been gradually worsening. The cough is productive of sputum. Associated symptoms include chest pain, headaches and shortness of breath. Pertinent negatives include no chills, fever or sore throat.            I personally reviewed and updated the patient's allergies, medications, problem list, and past medical, surgical, social, and family history. I have reviewed and confirmed the accuracy of the History of Present Illness and Review of Symptoms as documented by the MA/LPN/RN. Adam Sanchez MD    History reviewed. No pertinent family history.    Social History     Tobacco Use   • Smoking status: Never Smoker   • Smokeless tobacco: Never Used   Vaping Use   • Vaping Use: Never used   Substance Use Topics   • Alcohol use: No   • Drug use: Never       Past Surgical History:   Procedure Laterality Date   • CHOLECYSTECTOMY     • KNEE ARTHROSCOPY         Patient Active Problem List    Diagnosis   • Degenerative joint disease of right acromioclavicular joint   • Nontraumatic tear of rotator cuff   • Other tear of medial meniscus, current injury, left knee, subsequent encounter   • Orthopedic aftercare   • Hemorrhoids   • Overweight with body mass index (BMI) of 28 to 28.9 in adult   • Short of breath on exertion   • Actinic keratosis   • Other chest pain         Current Outpatient Medications:   •  ciprofloxacin (CIPRO) 500 MG tablet, Take 1 tablet by mouth 2 (Two) Times a Day., Disp: 20 tablet, Rfl: 0  •  Hydrocort-Pramoxine, Perianal, (ANALPRAM-HC) 2.5-1 % rectal cream, Insert  into the rectum 3 (Three) Times a Day. 2-4 times daily, as needed or after each Bowel Movement, Disp: 30 g, Rfl: 2  •  Multiple Vitamins-Minerals (MULTIVITAMIN ADULTS 50+ PO), Take  by mouth., Disp: , Rfl:   •  naproxen (NAPROSYN) 375 MG tablet, Take 1 tablet by mouth 2 (Two) Times a Day As Needed for Moderate Pain ., Disp: 14 tablet, Rfl: 0  •  predniSONE (DELTASONE) 5 MG tablet, 40mg x 3 days, 20mg x 3 days, 10mg x 3 days, 5mg x 3 days, Disp: 45 tablet, Rfl: 0  •  omeprazole (priLOSEC) 40 MG capsule, Take 1 capsule by mouth Daily., Disp: 30 capsule, Rfl: 5         Review of Systems   Constitutional: Negative for chills, diaphoresis and fever.   HENT: Negative for sore throat.    Eyes: Negative for visual disturbance.   Respiratory: Positive for cough and shortness of breath.    Cardiovascular: Positive for chest pain. Negative for palpitations.   Gastrointestinal: Negative for abdominal pain, nausea and vomiting.   Endocrine: Negative for polydipsia and polyphagia.   Musculoskeletal: Negative for neck pain and neck stiffness.   Skin: Negative for color change and pallor.   Neurological: Negative for seizures and syncope.   Hematological: Negative for adenopathy.   Psychiatric/Behavioral: Negative for hallucinations and suicidal ideas.       I have reviewed and confirmed the accuracy of the ROS as documented by the  "MA/LPN/RN Adam Sanchez MD      Objective   /72   Pulse 86   Temp 97.5 °F (36.4 °C)   Resp 16   Ht 172.7 cm (68\")   Wt 84.6 kg (186 lb 9.6 oz)   SpO2 98%   BMI 28.37 kg/m²   BP Readings from Last 3 Encounters:   03/02/21 122/72   02/28/21 125/70   02/19/21 127/77     Wt Readings from Last 3 Encounters:   03/02/21 84.6 kg (186 lb 9.6 oz)   02/28/21 84 kg (185 lb 3 oz)   02/19/21 85 kg (187 lb 6.4 oz)     Physical Exam  Constitutional:       Appearance: Normal appearance. He is well-developed. He is not diaphoretic.   Cardiovascular:      Rate and Rhythm: Normal rate and regular rhythm.      Pulses: Normal pulses.      Heart sounds: Normal heart sounds, S1 normal and S2 normal. No murmur. No friction rub. No gallop.    Pulmonary:      Effort: Pulmonary effort is normal. No accessory muscle usage.      Breath sounds: Normal breath sounds. No stridor. No decreased breath sounds, wheezing, rhonchi or rales.   Abdominal:      General: Bowel sounds are normal. There is no distension.      Palpations: Abdomen is soft. Abdomen is not rigid. There is no mass or pulsatile mass.      Tenderness: There is no abdominal tenderness. There is no guarding or rebound. Negative signs include Dyer's sign.      Hernia: No hernia is present.   Skin:     General: Skin is warm and dry.      Coloration: Skin is not pale.   Neurological:      Mental Status: He is alert and oriented to person, place, and time.      Coordination: Coordination normal.      Gait: Gait normal.         Data / Lab Results:    No results found for: HGBA1C     No results found for: LDL, LDLDIRECT  No results found for: CHOL  No results found for: TRIG  No results found for: HDL  No results found for: PSA  Lab Results   Component Value Date    WBC 11.70 (H) 02/28/2021    HGB 15.6 02/28/2021    HCT 44.8 02/28/2021    MCV 95.4 02/28/2021     02/28/2021     No results found for: TSH, A1JOFIK, F0RHHWJ   Lab Results   Component Value Date    GLUCOSE " 128 (H) 02/28/2021    BUN 12 02/28/2021    CREATININE 1.11 02/28/2021    EGFRIFNONA 68 02/28/2021    BCR 10.8 02/28/2021    K 3.9 02/28/2021    CO2 25.0 02/28/2021    CALCIUM 9.3 02/28/2021     No results found for: MALU, RF, SEDRATE   No results found for: CRP   No results found for: IRON, TIBC, FERRITIN   No results found for: [id number]       Assessment/Plan      Medications        Problem List         LOS      Cough/wheezing.  DDx includes bronchitis, improving today on antibiotics/prednisone.  Increase fluid intake/rest.  Chest x-ray negative/COVID-19 swab negative.  Follow-up recheck.  Call return if fever worsening symptoms.  Chest pain.  Exertional, with shortness of breath.  DDx includes angina, infection, GERD, stress/anxiety.  EKG with flipped T's inferior leads, stable on repeat per ED. troponin, pro BNP normal in office/reoeat stable per ED..  Cardiac stress testing recommended, he declined secondary to cost, cardiology referral scheduled..  Covid swab negative x2. Chest x-ray benign.  Follow-up recheck.  Start PPI Rx, recommend anxiety Rx he declines.    GERD.  Restart PPI.  Clinically improved currently, off Rx.  History of EGD with dilation.  Will get records.  Skin lesion chest.  Possible AK.  Dermatology referral scheduled.  Hemorrhoids.  Clinically improved.  Colorectal surgery referral scheduled.        Diagnoses and all orders for this visit:    1. Short of breath on exertion (Primary)    2. Overweight with body mass index (BMI) of 28 to 28.9 in adult    3. Gastroesophageal reflux disease, unspecified whether esophagitis present  -     omeprazole (priLOSEC) 40 MG capsule; Take 1 capsule by mouth Daily.  Dispense: 30 capsule; Refill: 5    4. Other chest pain              Expected course, medications, and adverse effects discussed.  Call or return if worsening or persistent symptoms.  I wore protective equipment throughout this patient encounter including a mask, gloves, and eye protection.  Hand  hygiene was performed before donning protective equipment and after removal when leaving the room. The complete contents of the Assessment and Plan and Data/Lab Results as documented above have been reviewed and addressed by myself with the patient today as part of an ongoing evaluation / treatment plan.  If some of the documentation has been copied from a previous note and is unchanged it indicates that this problem / plan has been assessed today but is stable from a previous visit and no changes have been recommended.

## 2021-03-06 PROBLEM — R07.89 OTHER CHEST PAIN: Status: ACTIVE | Noted: 2021-03-06

## 2021-03-11 PROBLEM — R00.1 BRADYCARDIA, SINUS: Status: ACTIVE | Noted: 2021-03-11

## 2021-03-11 NOTE — PROGRESS NOTES
Date of Office Visit: 2021  Encounter Provider: Dr. Félix Taylor  Place of Service: [facility] CARDIOLOGY [place]  Patient Name: Jean-Claude Clifford Jr.  :1965  David Vallejo Sr., MD    Chief Complaint   Patient presents with   • Slow Heart Rate     consult   • Chest Pain   • Shortness of Breath     History of Present Illness:    I am pleased to see Mr. Clifford in my office today as a new consultation.    As you know, patient is 55-year-old white female whose past medical history is insignificant for medical illness who is referred to me for symptom of chest pain.    Patient reports that he is pretty active person.  However from last 1 month he is having symptom of chest pressure in the center of the chest with radiation to both arms.  It was associated with exertion and relieved with rest.  Patient also was short of breath.  He also noticed he could not finish the exercise that he used to do.  Patient was admitted in the emergency room at [facility] with symptom of chest discomfort and initial troponins and EKG and chest x-ray was unremarkable.  Patient continues to have the symptoms.  Patient denies any palpitation.  No syncope or presyncope.    Patient has history of family for premature CAD.  Patient denies any abuse of alcohol or tobacco.    Patient is having recurrent and ongoing chest pain on and off.  I would recommend to proceed with stress Myoview.  I would also proceed with aspirin.  Patient is advised to come to the hospital if there is worsening of symptoms          Past Medical History:   Diagnosis Date   • Anxiety    • Depression    • Mild cognitive impairment          Past Surgical History:   Procedure Laterality Date   • CHOLECYSTECTOMY     • KNEE ARTHROSCOPY             Current Outpatient Medications:   •  Hydrocort-Pramoxine, Perianal, (ANALPRAM-HC) 2.5-1 % rectal cream, Insert  into the rectum 3 (Three) Times a Day. 2-4 times daily, as needed or after each  "Bowel Movement, Disp: 30 g, Rfl: 2  •  Multiple Vitamins-Minerals (MULTIVITAMIN ADULTS 50+ PO), Take  by mouth., Disp: , Rfl:   •  omeprazole (priLOSEC) 40 MG capsule, Take 1 capsule by mouth Daily., Disp: 30 capsule, Rfl: 5      Social History     Socioeconomic History   • Marital status:      Spouse name: Not on file   • Number of children: Not on file   • Years of education: Not on file   • Highest education level: Not on file   Tobacco Use   • Smoking status: Never Smoker   • Smokeless tobacco: Never Used   Vaping Use   • Vaping Use: Never used   Substance and Sexual Activity   • Alcohol use: No   • Drug use: Never   • Sexual activity: Yes     Partners: Female         Review of Systems   Constitutional: Negative for chills and fever.   HENT: Negative for ear discharge and nosebleeds.    Eyes: Negative for discharge and redness.   Cardiovascular: Positive for chest pain. Negative for orthopnea, palpitations, paroxysmal nocturnal dyspnea and syncope.   Respiratory: Positive for shortness of breath. Negative for cough and wheezing.    Endocrine: Negative for heat intolerance.   Skin: Negative for rash.   Musculoskeletal: Negative for arthritis and myalgias.   Gastrointestinal: Negative for abdominal pain, melena, nausea and vomiting.   Genitourinary: Negative for dysuria and hematuria.   Neurological: Negative for dizziness, light-headedness, numbness and tremors.   Psychiatric/Behavioral: Negative for depression. The patient is not nervous/anxious.        Procedures    Procedures    No orders to display           Objective:    /76   Pulse 70   Ht 172.7 cm (67.99\")   Wt 81.6 kg (180 lb)   BMI 27.38 kg/m²         Constitutional:       Appearance: Well-developed.   Eyes:      General: No scleral icterus.        Right eye: No discharge.   HENT:      Head: Normocephalic and atraumatic.   Neck:      Thyroid: No thyromegaly.      Lymphadenopathy: No cervical adenopathy.   Pulmonary:      Effort: " Pulmonary effort is normal. No respiratory distress.      Breath sounds: Normal breath sounds. No wheezing. No rales.   Cardiovascular:      Normal rate. Regular rhythm.      No gallop.   Abdominal:      Tenderness: There is no abdominal tenderness.   Skin:     Findings: No erythema or rash.   Neurological:      Mental Status: Alert and oriented to person, place, and time.             Assessment:       Diagnosis Plan   1. Other chest pain  Stress Test With Myocardial Perfusion One Day   2. Short of breath on exertion  Stress Test With Myocardial Perfusion One Day   3. Bradycardia, sinus  Stress Test With Myocardial Perfusion One Day   4. Angina pectoris (CMS/[facility])  Stress Test With Myocardial Perfusion One Day            Plan:       MDM:    1.  Angina pectoris:    I advised the patient to start taking aspirin and also nitrates but he does not want to take nitrates at present.  However I would proceed with stress test with Cardiolite imaging.  Further recommendation on the results of these test.  Patient is advised to come to the hospital if there is worsening of symptoms    2.  Shortness of breath:    I would recommend stress test but patient has some financial and insurance restriction he does not want to proceed with echocardiogram.

## 2021-03-12 ENCOUNTER — OFFICE VISIT (OUTPATIENT)
Dept: CARDIOLOGY | Facility: CLINIC | Age: 56
End: 2021-03-12

## 2021-03-12 VITALS
HEIGHT: 68 IN | BODY MASS INDEX: 27.28 KG/M2 | HEART RATE: 70 BPM | SYSTOLIC BLOOD PRESSURE: 111 MMHG | DIASTOLIC BLOOD PRESSURE: 76 MMHG | WEIGHT: 180 LBS

## 2021-03-12 DIAGNOSIS — R07.89 OTHER CHEST PAIN: Primary | ICD-10-CM

## 2021-03-12 DIAGNOSIS — R06.02 SHORT OF BREATH ON EXERTION: ICD-10-CM

## 2021-03-12 DIAGNOSIS — I20.9 ANGINA PECTORIS (HCC): ICD-10-CM

## 2021-03-12 DIAGNOSIS — R00.1 BRADYCARDIA, SINUS: ICD-10-CM

## 2021-03-12 PROCEDURE — 99204 OFFICE O/P NEW MOD 45 MIN: CPT | Performed by: INTERNAL MEDICINE

## 2021-03-17 ENCOUNTER — TELEPHONE (OUTPATIENT)
Dept: FAMILY MEDICINE CLINIC | Facility: CLINIC | Age: 56
End: 2021-03-17

## 2021-03-17 NOTE — TELEPHONE ENCOUNTER
We received a correspondence regarding an appointment scheduled for 3/24/21 at 7:30am with Dr Fisher

## 2021-03-18 ENCOUNTER — TELEPHONE (OUTPATIENT)
Dept: FAMILY MEDICINE CLINIC | Facility: CLINIC | Age: 56
End: 2021-03-18

## 2021-03-18 NOTE — TELEPHONE ENCOUNTER
Patient is calling to request a call back to discuss Stress Test that needs to be done at [facility], has an issue with how much they are trying to charge for it.    Please advise    [phone]

## 2021-03-19 NOTE — TELEPHONE ENCOUNTER
I spoke to patient and advised that I was not able to get any further with the payment situation but did explain that [employer] helped be get a hold of the account billing notes and reminded him that they don't expect him to pay up front for this. They did say he must set up a payment plan prior to the date of service and I advised him to call Monday to do this. I have sent him to billing offices number via MY-CHART.   He said he got an e-mail from [employer] (medical share plan/insurance) stating they will reimburse him- but he is very upset that [facility] won't accept this type of plan as an acceptable payment because he hasn't had any issues at any other [facility] facility with billing- including our office.     He said he will call Monday and set up a payment plan-

## 2021-03-23 ENCOUNTER — HOSPITAL ENCOUNTER (OUTPATIENT)
Dept: NUCLEAR MEDICINE | Facility: HOSPITAL | Age: 56
Discharge: HOME | End: 2021-03-23

## 2021-03-23 DIAGNOSIS — I25.9 CHEST PAIN DUE TO MYOCARDIAL ISCHEMIA, UNSPECIFIED ISCHEMIC CHEST PAIN TYPE: Primary | ICD-10-CM

## 2021-03-23 DIAGNOSIS — R07.89 OTHER CHEST PAIN: ICD-10-CM

## 2021-03-23 DIAGNOSIS — R00.1 BRADYCARDIA, SINUS: ICD-10-CM

## 2021-03-23 DIAGNOSIS — R06.02 SHORT OF BREATH ON EXERTION: ICD-10-CM

## 2021-03-23 DIAGNOSIS — I20.9 ANGINA PECTORIS (HCC): ICD-10-CM

## 2021-03-23 LAB
BH CV REST NUCLEAR ISOTOPE DOSE: 6.6 MCI
BH CV STRESS BP STAGE 2: NORMAL
BH CV STRESS DURATION MIN STAGE 1: 3
BH CV STRESS DURATION MIN STAGE 2: 3
BH CV STRESS DURATION SEC STAGE 1: 0
BH CV STRESS DURATION SEC STAGE 2: 0
BH CV STRESS GRADE STAGE 1: 10
BH CV STRESS GRADE STAGE 2: 12
BH CV STRESS HR STAGE 1: 100
BH CV STRESS HR STAGE 2: 105
BH CV STRESS METS STAGE 1: 5
BH CV STRESS METS STAGE 2: 7.5
BH CV STRESS NUCLEAR ISOTOPE DOSE: 21 MCI
BH CV STRESS PROTOCOL 1: NORMAL
BH CV STRESS RECOVERY BP: NORMAL MMHG
BH CV STRESS RECOVERY HR: 75 BPM
BH CV STRESS SPEED STAGE 1: 1.7
BH CV STRESS SPEED STAGE 2: 2.5
BH CV STRESS STAGE 1: 1
BH CV STRESS STAGE 2: 2
LV EF NUC BP: 54 %
MAXIMAL PREDICTED HEART RATE: 163 BPM
PERCENT MAX PREDICTED HR: 64.42 %
STRESS BASELINE BP: NORMAL MMHG
STRESS BASELINE HR: 82 BPM
STRESS PERCENT HR: 76 %
STRESS POST EXERCISE DUR MIN: 5 MIN
STRESS POST EXERCISE DUR SEC: 36 SEC
STRESS POST PEAK BP: NORMAL MMHG
STRESS POST PEAK HR: 105 BPM
STRESS TARGET HR: 139 BPM

## 2021-03-23 PROCEDURE — 78452 HT MUSCLE IMAGE SPECT MULT: CPT

## 2021-03-23 PROCEDURE — 0 TECHNETIUM SESTAMIBI: Performed by: INTERNAL MEDICINE

## 2021-03-23 PROCEDURE — 93018 CV STRESS TEST I&R ONLY: CPT | Performed by: NURSE PRACTITIONER

## 2021-03-23 PROCEDURE — A9500 TC99M SESTAMIBI: HCPCS | Performed by: INTERNAL MEDICINE

## 2021-03-23 PROCEDURE — 78452 HT MUSCLE IMAGE SPECT MULT: CPT | Performed by: INTERNAL MEDICINE

## 2021-03-23 PROCEDURE — 93017 CV STRESS TEST TRACING ONLY: CPT

## 2021-03-23 RX ORDER — CLOPIDOGREL BISULFATE 75 MG/1
75 TABLET ORAL DAILY
Qty: 30 TABLET | Refills: 11 | Status: ON HOLD | OUTPATIENT
Start: 2021-03-23 | End: 2021-03-30

## 2021-03-23 RX ORDER — ISOSORBIDE MONONITRATE 30 MG/1
30 TABLET, EXTENDED RELEASE ORAL DAILY
Qty: 30 TABLET | Refills: 11 | Status: SHIPPED | OUTPATIENT
Start: 2021-03-23 | End: 2021-03-29

## 2021-03-23 RX ORDER — ASPIRIN 325 MG
81 TABLET ORAL DAILY
Qty: 30 TABLET | Refills: 11 | COMMUNITY
End: 2021-04-19

## 2021-03-23 RX ADMIN — TECHNETIUM TC 99M SESTAMIBI 1 DOSE: 1 INJECTION INTRAVENOUS at 09:10

## 2021-03-23 RX ADMIN — TECHNETIUM TC 99M SESTAMIBI 1 DOSE: 1 INJECTION INTRAVENOUS at 10:30

## 2021-03-23 NOTE — PROGRESS NOTES
Pt in today for stress myoview    Pt developed chest pain after 2minutes of walking progressed from 2 up to 8 with chest pain associated with 2mm ST depression inferiorly    Chest  Pain and EKG normalized quickly with rest.     Reviewed with Dr. Taylor he will review films    Pt to be  Started on ASA, Nitrates and Plavix    Cardiac Catheterization scheduled.    No BB due to resting  Bradycardia.

## 2021-03-25 ENCOUNTER — TELEPHONE (OUTPATIENT)
Dept: CARDIOLOGY | Facility: CLINIC | Age: 56
End: 2021-03-25

## 2021-03-25 DIAGNOSIS — R00.1 BRADYCARDIA: Primary | ICD-10-CM

## 2021-03-25 NOTE — TELEPHONE ENCOUNTER
Patient called and stated that the medication Dr. Taylor started him on Tuesday IMDUR 30 mg and Plavix 75 mg and he states he has a migraine and he was wondering what he can take. He stated that he took a Advil migraine at 1 am this morning and it helped but now that he has taken this medication again the migraine is back and he can't take any Advil migraine yet.

## 2021-03-27 ENCOUNTER — LAB (OUTPATIENT)
Dept: LAB | Facility: HOSPITAL | Age: 56
End: 2021-03-27

## 2021-03-27 DIAGNOSIS — I25.9 CHEST PAIN DUE TO MYOCARDIAL ISCHEMIA, UNSPECIFIED ISCHEMIC CHEST PAIN TYPE: ICD-10-CM

## 2021-03-27 DIAGNOSIS — R00.1 BRADYCARDIA: ICD-10-CM

## 2021-03-27 LAB
ANION GAP SERPL CALCULATED.3IONS-SCNC: 8.5 MMOL/L (ref 5–15)
APTT PPP: 24.8 SECONDS (ref 24–31)
BUN SERPL-MCNC: 9 MG/DL (ref 6–20)
BUN/CREAT SERPL: 9.8 (ref 7–25)
CALCIUM SPEC-SCNC: 9.1 MG/DL (ref 8.6–10.5)
CHLORIDE SERPL-SCNC: 103 MMOL/L (ref 98–107)
CO2 SERPL-SCNC: 25.5 MMOL/L (ref 22–29)
CREAT SERPL-MCNC: 0.92 MG/DL (ref 0.76–1.27)
DEPRECATED RDW RBC AUTO: 43.1 FL (ref 37–54)
ERYTHROCYTE [DISTWIDTH] IN BLOOD BY AUTOMATED COUNT: 12.3 % (ref 12.3–15.4)
GFR SERPL CREATININE-BSD FRML MDRD: 85 ML/MIN/1.73
GLUCOSE SERPL-MCNC: 113 MG/DL (ref 65–99)
HCT VFR BLD AUTO: 42.7 % (ref 37.5–51)
HGB BLD-MCNC: 14.6 G/DL (ref 13–17.7)
INR PPP: <0.93 (ref 0.93–1.1)
MCH RBC QN AUTO: 32.7 PG (ref 26.6–33)
MCHC RBC AUTO-ENTMCNC: 34.2 G/DL (ref 31.5–35.7)
MCV RBC AUTO: 95.7 FL (ref 79–97)
PLATELET # BLD AUTO: 210 10*3/MM3 (ref 140–450)
PMV BLD AUTO: 9.3 FL (ref 6–12)
POTASSIUM SERPL-SCNC: 4.5 MMOL/L (ref 3.5–5.2)
PROTHROMBIN TIME: 10.1 SECONDS (ref 9.6–11.7)
RBC # BLD AUTO: 4.46 10*6/MM3 (ref 4.14–5.8)
SARS-COV-2 ORF1AB RESP QL NAA+PROBE: NOT DETECTED
SODIUM SERPL-SCNC: 137 MMOL/L (ref 136–145)
WBC # BLD AUTO: 4.19 10*3/MM3 (ref 3.4–10.8)

## 2021-03-27 PROCEDURE — 85610 PROTHROMBIN TIME: CPT

## 2021-03-27 PROCEDURE — 80048 BASIC METABOLIC PNL TOTAL CA: CPT

## 2021-03-27 PROCEDURE — 85027 COMPLETE CBC AUTOMATED: CPT

## 2021-03-27 PROCEDURE — U0004 COV-19 TEST NON-CDC HGH THRU: HCPCS

## 2021-03-27 PROCEDURE — 36415 COLL VENOUS BLD VENIPUNCTURE: CPT

## 2021-03-27 PROCEDURE — 85730 THROMBOPLASTIN TIME PARTIAL: CPT

## 2021-03-27 PROCEDURE — C9803 HOPD COVID-19 SPEC COLLECT: HCPCS

## 2021-03-29 RX ORDER — ISOSORBIDE MONONITRATE 30 MG/1
15 TABLET, EXTENDED RELEASE ORAL DAILY
COMMUNITY
End: 2021-04-04 | Stop reason: HOSPADM

## 2021-03-30 ENCOUNTER — APPOINTMENT (OUTPATIENT)
Dept: CARDIOLOGY | Facility: HOSPITAL | Age: 56
End: 2021-03-30

## 2021-03-30 ENCOUNTER — ANESTHESIA EVENT (OUTPATIENT)
Dept: PERIOP | Facility: HOSPITAL | Age: 56
End: 2021-03-30

## 2021-03-30 ENCOUNTER — APPOINTMENT (OUTPATIENT)
Dept: GENERAL RADIOLOGY | Facility: HOSPITAL | Age: 56
End: 2021-03-30

## 2021-03-30 ENCOUNTER — HOSPITAL ENCOUNTER (INPATIENT)
Facility: HOSPITAL | Age: 56
LOS: 5 days | Discharge: HOME | End: 2021-04-04
Attending: INTERNAL MEDICINE | Admitting: THORACIC SURGERY (CARDIOTHORACIC VASCULAR SURGERY)

## 2021-03-30 DIAGNOSIS — Z95.1 S/P CABG (CORONARY ARTERY BYPASS GRAFT): ICD-10-CM

## 2021-03-30 DIAGNOSIS — I25.9 CHEST PAIN DUE TO MYOCARDIAL ISCHEMIA, UNSPECIFIED ISCHEMIC CHEST PAIN TYPE: ICD-10-CM

## 2021-03-30 DIAGNOSIS — I25.110 CORONARY ARTERY DISEASE INVOLVING NATIVE CORONARY ARTERY OF NATIVE HEART WITH UNSTABLE ANGINA PECTORIS (HCC): Primary | ICD-10-CM

## 2021-03-30 LAB
ABO GROUP BLD: NORMAL
APTT PPP: 24.4 SECONDS (ref 24–31)
ARTERIAL PATENCY WRIST A: POSITIVE
ATMOSPHERIC PRESS: ABNORMAL MM[HG]
BASE EXCESS BLDA CALC-SCNC: -1.7 MMOL/L (ref 0–3)
BASOPHILS # BLD AUTO: 0 10*3/MM3 (ref 0–0.2)
BASOPHILS NFR BLD AUTO: 0.1 % (ref 0–1.5)
BDY SITE: ABNORMAL
BH CV XLRA MEAS - DIST GSV THIGH DIST LEFT: 0.32 CM
BH CV XLRA MEAS - DIST GSV THIGH DIST RIGHT: 0.29 CM
BH CV XLRA MEAS - GSV ANKLE DIST LEFT: 0.17 CM
BH CV XLRA MEAS - GSV ANKLE DIST RIGHT: 0.19 CM
BH CV XLRA MEAS - MID GSV CALF LEFT: 0.2 CM
BH CV XLRA MEAS - MID GSV CALF RIGHT: 0.16 CM
BH CV XLRA MEAS - MID GSV THIGH  LEFT: 0.29 CM
BH CV XLRA MEAS - MID GSV THIGH  RIGHT: 0.29 CM
BH CV XLRA MEAS - PROX GSV CALF DIST LEFT: 0.24 CM
BH CV XLRA MEAS - PROX GSV CALF DIST RIGHT: 0.27 CM
BH CV XLRA MEAS - PROX GSV THIGH  LEFT: 0.32 CM
BH CV XLRA MEAS - PROX GSV THIGH  RIGHT: 0.31 CM
BH CV XLRA MEAS LEFT BULB PSV: -57.4 CM/SEC
BH CV XLRA MEAS LEFT CCA RATIO VEL: 134 CM/SEC
BH CV XLRA MEAS LEFT DIST CCA EDV: 11.2 CM/SEC
BH CV XLRA MEAS LEFT DIST CCA PSV: 104 CM/SEC
BH CV XLRA MEAS LEFT DIST ICA EDV: -23.6 CM/SEC
BH CV XLRA MEAS LEFT DIST ICA PSV: -70.8 CM/SEC
BH CV XLRA MEAS LEFT ICA RATIO VEL: -76.2 CM/SEC
BH CV XLRA MEAS LEFT ICA/CCA RATIO: -0.57
BH CV XLRA MEAS LEFT PROX CCA EDV: 15.4 CM/SEC
BH CV XLRA MEAS LEFT PROX CCA PSV: 134 CM/SEC
BH CV XLRA MEAS LEFT PROX ECA PSV: -177 CM/SEC
BH CV XLRA MEAS LEFT PROX ICA EDV: -27 CM/SEC
BH CV XLRA MEAS LEFT PROX ICA PSV: -76.2 CM/SEC
BH CV XLRA MEAS LEFT PROX SCLA PSV: 202 CM/SEC
BH CV XLRA MEAS LEFT VERTEBRAL A PSV: -47.8 CM/SEC
BH CV XLRA MEAS RIGHT BULB PSV: -80.6 CM/SEC
BH CV XLRA MEAS RIGHT CCA RATIO VEL: -105 CM/SEC
BH CV XLRA MEAS RIGHT DIST CCA EDV: 14.7 CM/SEC
BH CV XLRA MEAS RIGHT DIST CCA PSV: 95.3 CM/SEC
BH CV XLRA MEAS RIGHT DIST ICA EDV: -32.9 CM/SEC
BH CV XLRA MEAS RIGHT DIST ICA PSV: -98.2 CM/SEC
BH CV XLRA MEAS RIGHT ICA RATIO VEL: -98.2 CM/SEC
BH CV XLRA MEAS RIGHT ICA/CCA RATIO: 0.94
BH CV XLRA MEAS RIGHT PROX CCA EDV: -12.6 CM/SEC
BH CV XLRA MEAS RIGHT PROX CCA PSV: -105 CM/SEC
BH CV XLRA MEAS RIGHT PROX ECA EDV: -9.8 CM/SEC
BH CV XLRA MEAS RIGHT PROX ECA PSV: -116 CM/SEC
BH CV XLRA MEAS RIGHT PROX ICA EDV: -25.8 CM/SEC
BH CV XLRA MEAS RIGHT PROX ICA PSV: -78.5 CM/SEC
BH CV XLRA MEAS RIGHT PROX SCLA PSV: 185 CM/SEC
BH CV XLRA MEAS RIGHT VERTEBRAL A PSV: -48.2 CM/SEC
BILIRUB UR QL STRIP: NEGATIVE
BLD GP AB SCN SERPL QL: NEGATIVE
CLARITY UR: CLEAR
CO2 BLDA-SCNC: 23.3 MMOL/L (ref 22–29)
COLOR UR: YELLOW
DEPRECATED RDW RBC AUTO: 42.9 FL (ref 37–54)
EOSINOPHIL # BLD AUTO: 0 10*3/MM3 (ref 0–0.4)
EOSINOPHIL NFR BLD AUTO: 0 % (ref 0.3–6.2)
ERYTHROCYTE [DISTWIDTH] IN BLOOD BY AUTOMATED COUNT: 13 % (ref 12.3–15.4)
GLUCOSE UR STRIP-MCNC: NEGATIVE MG/DL
HBA1C MFR BLD: 5.5 % (ref 3.5–5.6)
HCO3 BLDA-SCNC: 22.2 MMOL/L (ref 21–28)
HCT VFR BLD AUTO: 41.7 % (ref 37.5–51)
HEMODILUTION: NO
HGB BLD-MCNC: 14.6 G/DL (ref 13–17.7)
HGB UR QL STRIP.AUTO: NEGATIVE
INHALED O2 CONCENTRATION: 21 %
INR PPP: 0.95 (ref 0.93–1.1)
KETONES UR QL STRIP: ABNORMAL
LEUKOCYTE ESTERASE UR QL STRIP.AUTO: NEGATIVE
LYMPHOCYTES # BLD AUTO: 0.5 10*3/MM3 (ref 0.7–3.1)
LYMPHOCYTES NFR BLD AUTO: 6.9 % (ref 19.6–45.3)
MCH RBC QN AUTO: 33.1 PG (ref 26.6–33)
MCHC RBC AUTO-ENTMCNC: 34.9 G/DL (ref 31.5–35.7)
MCV RBC AUTO: 94.8 FL (ref 79–97)
MODALITY: ABNORMAL
MONOCYTES # BLD AUTO: 0 10*3/MM3 (ref 0.1–0.9)
MONOCYTES NFR BLD AUTO: 0.6 % (ref 5–12)
NEUTROPHILS NFR BLD AUTO: 6.3 10*3/MM3 (ref 1.7–7)
NEUTROPHILS NFR BLD AUTO: 92.4 % (ref 42.7–76)
NITRITE UR QL STRIP: NEGATIVE
NRBC BLD AUTO-RTO: 0 /100 WBC (ref 0–0.2)
PCO2 BLDA: 34.7 MM HG (ref 35–48)
PH BLDA: 7.42 PH UNITS (ref 7.35–7.45)
PH UR STRIP.AUTO: 6.5 [PH] (ref 5–8)
PLATELET # BLD AUTO: 213 10*3/MM3 (ref 140–450)
PMV BLD AUTO: 6.9 FL (ref 6–12)
PO2 BLDA: 65.9 MM HG (ref 83–108)
PROT UR QL STRIP: NEGATIVE
PROTHROMBIN TIME: 10.5 SECONDS (ref 9.6–11.7)
QT INTERVAL: 289 MS
RBC # BLD AUTO: 4.4 10*6/MM3 (ref 4.14–5.8)
RH BLD: POSITIVE
RIGHT ARM BP: NORMAL MMHG
SAO2 % BLDCOA: 93.2 % (ref 94–98)
SARS-COV-2 RNA PNL SPEC NAA+PROBE: NOT DETECTED
SP GR UR STRIP: 1.05 (ref 1–1.03)
T&S EXPIRATION DATE: NORMAL
UROBILINOGEN UR QL STRIP: ABNORMAL
WBC # BLD AUTO: 6.9 10*3/MM3 (ref 3.4–10.8)

## 2021-03-30 PROCEDURE — 85610 PROTHROMBIN TIME: CPT | Performed by: NURSE PRACTITIONER

## 2021-03-30 PROCEDURE — 93458 L HRT ARTERY/VENTRICLE ANGIO: CPT | Performed by: INTERNAL MEDICINE

## 2021-03-30 PROCEDURE — B2111ZZ FLUOROSCOPY OF MULTIPLE CORONARY ARTERIES USING LOW OSMOLAR CONTRAST: ICD-10-PCS | Performed by: INTERNAL MEDICINE

## 2021-03-30 PROCEDURE — C1769 GUIDE WIRE: HCPCS | Performed by: INTERNAL MEDICINE

## 2021-03-30 PROCEDURE — 82803 BLOOD GASES ANY COMBINATION: CPT

## 2021-03-30 PROCEDURE — 86900 BLOOD TYPING SEROLOGIC ABO: CPT | Performed by: NURSE PRACTITIONER

## 2021-03-30 PROCEDURE — 99153 MOD SED SAME PHYS/QHP EA: CPT | Performed by: INTERNAL MEDICINE

## 2021-03-30 PROCEDURE — 36600 WITHDRAWAL OF ARTERIAL BLOOD: CPT

## 2021-03-30 PROCEDURE — 86900 BLOOD TYPING SEROLOGIC ABO: CPT

## 2021-03-30 PROCEDURE — 99254 IP/OBS CNSLTJ NEW/EST MOD 60: CPT | Performed by: THORACIC SURGERY (CARDIOTHORACIC VASCULAR SURGERY)

## 2021-03-30 PROCEDURE — 25010000002 METHYLPREDNISOLONE PER 40 MG: Performed by: INTERNAL MEDICINE

## 2021-03-30 PROCEDURE — 86850 RBC ANTIBODY SCREEN: CPT | Performed by: NURSE PRACTITIONER

## 2021-03-30 PROCEDURE — 0 IOPAMIDOL PER 1 ML: Performed by: INTERNAL MEDICINE

## 2021-03-30 PROCEDURE — 94799 UNLISTED PULMONARY SVC/PX: CPT

## 2021-03-30 PROCEDURE — 99152 MOD SED SAME PHYS/QHP 5/>YRS: CPT | Performed by: INTERNAL MEDICINE

## 2021-03-30 PROCEDURE — 83036 HEMOGLOBIN GLYCOSYLATED A1C: CPT | Performed by: NURSE PRACTITIONER

## 2021-03-30 PROCEDURE — 86901 BLOOD TYPING SEROLOGIC RH(D): CPT

## 2021-03-30 PROCEDURE — 93970 EXTREMITY STUDY: CPT

## 2021-03-30 PROCEDURE — 86923 COMPATIBILITY TEST ELECTRIC: CPT

## 2021-03-30 PROCEDURE — 25010000002 METHYLPREDNISOLONE PER 125 MG: Performed by: INTERNAL MEDICINE

## 2021-03-30 PROCEDURE — 85025 COMPLETE CBC W/AUTO DIFF WBC: CPT | Performed by: NURSE PRACTITIONER

## 2021-03-30 PROCEDURE — B2151ZZ FLUOROSCOPY OF LEFT HEART USING LOW OSMOLAR CONTRAST: ICD-10-PCS | Performed by: INTERNAL MEDICINE

## 2021-03-30 PROCEDURE — 25010000002 DIPHENHYDRAMINE PER 50 MG: Performed by: INTERNAL MEDICINE

## 2021-03-30 PROCEDURE — C1894 INTRO/SHEATH, NON-LASER: HCPCS | Performed by: INTERNAL MEDICINE

## 2021-03-30 PROCEDURE — 25010000002 MIDAZOLAM PER 1 MG: Performed by: INTERNAL MEDICINE

## 2021-03-30 PROCEDURE — 85730 THROMBOPLASTIN TIME PARTIAL: CPT | Performed by: NURSE PRACTITIONER

## 2021-03-30 PROCEDURE — U0003 INFECTIOUS AGENT DETECTION BY NUCLEIC ACID (DNA OR RNA); SEVERE ACUTE RESPIRATORY SYNDROME CORONAVIRUS 2 (SARS-COV-2) (CORONAVIRUS DISEASE [COVID-19]), AMPLIFIED PROBE TECHNIQUE, MAKING USE OF HIGH THROUGHPUT TECHNOLOGIES AS DESCRIBED BY CMS-2020-01-R: HCPCS | Performed by: NURSE PRACTITIONER

## 2021-03-30 PROCEDURE — 93005 ELECTROCARDIOGRAM TRACING: CPT | Performed by: INTERNAL MEDICINE

## 2021-03-30 PROCEDURE — 86901 BLOOD TYPING SEROLOGIC RH(D): CPT | Performed by: NURSE PRACTITIONER

## 2021-03-30 PROCEDURE — 81003 URINALYSIS AUTO W/O SCOPE: CPT | Performed by: NURSE PRACTITIONER

## 2021-03-30 PROCEDURE — 25010000002 HEPARIN (PORCINE) 25000-0.45 UT/250ML-% SOLUTION: Performed by: INTERNAL MEDICINE

## 2021-03-30 PROCEDURE — 71045 X-RAY EXAM CHEST 1 VIEW: CPT

## 2021-03-30 PROCEDURE — 25010000002 LORAZEPAM PER 2 MG: Performed by: INTERNAL MEDICINE

## 2021-03-30 PROCEDURE — 93880 EXTRACRANIAL BILAT STUDY: CPT

## 2021-03-30 PROCEDURE — 25010000002 FENTANYL CITRATE (PF) 100 MCG/2ML SOLUTION: Performed by: INTERNAL MEDICINE

## 2021-03-30 PROCEDURE — 4A023N7 MEASUREMENT OF CARDIAC SAMPLING AND PRESSURE, LEFT HEART, PERCUTANEOUS APPROACH: ICD-10-PCS | Performed by: INTERNAL MEDICINE

## 2021-03-30 RX ORDER — ACETAMINOPHEN 325 MG/1
650 TABLET ORAL EVERY 4 HOURS PRN
Status: DISCONTINUED | OUTPATIENT
Start: 2021-03-30 | End: 2021-03-31 | Stop reason: HOSPADM

## 2021-03-30 RX ORDER — ISOSORBIDE MONONITRATE 30 MG/1
15 TABLET, EXTENDED RELEASE ORAL DAILY
Status: DISCONTINUED | OUTPATIENT
Start: 2021-03-31 | End: 2021-03-31 | Stop reason: HOSPADM

## 2021-03-30 RX ORDER — HEPARIN SODIUM 10000 [USP'U]/100ML
12 INJECTION, SOLUTION INTRAVENOUS
Status: DISCONTINUED | OUTPATIENT
Start: 2021-03-30 | End: 2021-03-31 | Stop reason: HOSPADM

## 2021-03-30 RX ORDER — ALUMINA, MAGNESIA, AND SIMETHICONE 2400; 2400; 240 MG/30ML; MG/30ML; MG/30ML
15 SUSPENSION ORAL EVERY 6 HOURS PRN
Status: DISCONTINUED | OUTPATIENT
Start: 2021-03-30 | End: 2021-03-31 | Stop reason: HOSPADM

## 2021-03-30 RX ORDER — METHYLPREDNISOLONE SODIUM SUCCINATE 40 MG/ML
40 INJECTION, POWDER, LYOPHILIZED, FOR SOLUTION INTRAMUSCULAR; INTRAVENOUS ONCE
Status: COMPLETED | OUTPATIENT
Start: 2021-03-30 | End: 2021-03-30

## 2021-03-30 RX ORDER — SODIUM CHLORIDE 9 MG/ML
INJECTION, SOLUTION INTRAVENOUS CONTINUOUS PRN
Status: COMPLETED | OUTPATIENT
Start: 2021-03-30 | End: 2021-03-30

## 2021-03-30 RX ORDER — SODIUM CHLORIDE 9 MG/ML
250 INJECTION, SOLUTION INTRAVENOUS ONCE AS NEEDED
Status: DISCONTINUED | OUTPATIENT
Start: 2021-03-30 | End: 2021-03-31 | Stop reason: HOSPADM

## 2021-03-30 RX ORDER — ALPRAZOLAM 0.25 MG/1
0.25 TABLET ORAL EVERY 8 HOURS PRN
Status: DISCONTINUED | OUTPATIENT
Start: 2021-03-30 | End: 2021-03-31 | Stop reason: HOSPADM

## 2021-03-30 RX ORDER — CHLORHEXIDINE GLUCONATE 0.12 MG/ML
15 RINSE ORAL EVERY 12 HOURS SCHEDULED
Status: COMPLETED | OUTPATIENT
Start: 2021-03-30 | End: 2021-03-31

## 2021-03-30 RX ORDER — CHLORHEXIDINE GLUCONATE 500 MG/1
1 CLOTH TOPICAL EVERY 12 HOURS
Status: DISCONTINUED | OUTPATIENT
Start: 2021-03-30 | End: 2021-03-31 | Stop reason: HOSPADM

## 2021-03-30 RX ORDER — DIPHENHYDRAMINE HCL 25 MG
25 CAPSULE ORAL EVERY 6 HOURS PRN
Status: DISCONTINUED | OUTPATIENT
Start: 2021-03-30 | End: 2021-03-31 | Stop reason: HOSPADM

## 2021-03-30 RX ORDER — LIDOCAINE HYDROCHLORIDE 20 MG/ML
INJECTION, SOLUTION INFILTRATION; PERINEURAL AS NEEDED
Status: DISCONTINUED | OUTPATIENT
Start: 2021-03-30 | End: 2021-03-30 | Stop reason: HOSPADM

## 2021-03-30 RX ORDER — MIDAZOLAM HYDROCHLORIDE 1 MG/ML
INJECTION INTRAMUSCULAR; INTRAVENOUS AS NEEDED
Status: DISCONTINUED | OUTPATIENT
Start: 2021-03-30 | End: 2021-03-30 | Stop reason: HOSPADM

## 2021-03-30 RX ORDER — ACETAMINOPHEN 500 MG
500 TABLET ORAL EVERY 6 HOURS PRN
COMMUNITY
End: 2021-05-03

## 2021-03-30 RX ORDER — ONDANSETRON 4 MG/1
4 TABLET, FILM COATED ORAL EVERY 6 HOURS PRN
Status: DISCONTINUED | OUTPATIENT
Start: 2021-03-30 | End: 2021-03-31 | Stop reason: HOSPADM

## 2021-03-30 RX ORDER — FENTANYL CITRATE 50 UG/ML
INJECTION, SOLUTION INTRAMUSCULAR; INTRAVENOUS AS NEEDED
Status: DISCONTINUED | OUTPATIENT
Start: 2021-03-30 | End: 2021-03-30 | Stop reason: HOSPADM

## 2021-03-30 RX ORDER — ONDANSETRON 2 MG/ML
4 INJECTION INTRAMUSCULAR; INTRAVENOUS EVERY 6 HOURS PRN
Status: DISCONTINUED | OUTPATIENT
Start: 2021-03-30 | End: 2021-03-31 | Stop reason: HOSPADM

## 2021-03-30 RX ORDER — METHYLPREDNISOLONE SODIUM SUCCINATE 125 MG/2ML
125 INJECTION, POWDER, LYOPHILIZED, FOR SOLUTION INTRAMUSCULAR; INTRAVENOUS ONCE
Status: COMPLETED | OUTPATIENT
Start: 2021-03-30 | End: 2021-03-30

## 2021-03-30 RX ORDER — SODIUM CHLORIDE 9 MG/ML
30 INJECTION, SOLUTION INTRAVENOUS CONTINUOUS
Status: DISCONTINUED | OUTPATIENT
Start: 2021-03-30 | End: 2021-03-31 | Stop reason: HOSPADM

## 2021-03-30 RX ORDER — ASPIRIN 81 MG/1
81 TABLET ORAL DAILY
Status: DISCONTINUED | OUTPATIENT
Start: 2021-03-30 | End: 2021-03-31 | Stop reason: HOSPADM

## 2021-03-30 RX ORDER — NITROGLYCERIN 20 MG/100ML
10-50 INJECTION INTRAVENOUS
Status: DISCONTINUED | OUTPATIENT
Start: 2021-03-30 | End: 2021-03-31 | Stop reason: HOSPADM

## 2021-03-30 RX ORDER — LORAZEPAM 2 MG/ML
1 INJECTION INTRAMUSCULAR ONCE
Status: COMPLETED | OUTPATIENT
Start: 2021-03-30 | End: 2021-03-30

## 2021-03-30 RX ORDER — DIPHENHYDRAMINE HYDROCHLORIDE 50 MG/ML
25 INJECTION INTRAMUSCULAR; INTRAVENOUS ONCE
Status: COMPLETED | OUTPATIENT
Start: 2021-03-30 | End: 2021-03-30

## 2021-03-30 RX ADMIN — SODIUM CHLORIDE 30 ML/HR: 9 INJECTION, SOLUTION INTRAVENOUS at 10:54

## 2021-03-30 RX ADMIN — NITROGLYCERIN 10 MCG/MIN: 20 INJECTION INTRAVENOUS at 15:49

## 2021-03-30 RX ADMIN — LORAZEPAM 1 MG: 2 INJECTION INTRAMUSCULAR; INTRAVENOUS at 11:19

## 2021-03-30 RX ADMIN — ALPRAZOLAM 0.25 MG: 0.25 TABLET ORAL at 18:08

## 2021-03-30 RX ADMIN — METOPROLOL TARTRATE 25 MG: 25 TABLET, FILM COATED ORAL at 20:00

## 2021-03-30 RX ADMIN — METHYLPREDNISOLONE SODIUM SUCCINATE 125 MG: 125 INJECTION, POWDER, FOR SOLUTION INTRAMUSCULAR; INTRAVENOUS at 11:18

## 2021-03-30 RX ADMIN — ACETAMINOPHEN 650 MG: 325 TABLET ORAL at 19:55

## 2021-03-30 RX ADMIN — DIPHENHYDRAMINE HYDROCHLORIDE 25 MG: 50 INJECTION, SOLUTION INTRAMUSCULAR; INTRAVENOUS at 11:18

## 2021-03-30 RX ADMIN — CHLORHEXIDINE GLUCONATE 0.12% ORAL RINSE 15 ML: 1.2 LIQUID ORAL at 19:37

## 2021-03-30 RX ADMIN — METHYLPREDNISOLONE SODIUM SUCCINATE 40 MG: 40 INJECTION, POWDER, FOR SOLUTION INTRAMUSCULAR; INTRAVENOUS at 11:39

## 2021-03-30 RX ADMIN — HEPARIN SODIUM 12 UNITS/KG/HR: 10000 INJECTION, SOLUTION INTRAVENOUS at 19:37

## 2021-03-30 RX ADMIN — MUPIROCIN 1 APPLICATION: 20 OINTMENT TOPICAL at 19:37

## 2021-03-31 ENCOUNTER — APPOINTMENT (OUTPATIENT)
Dept: GENERAL RADIOLOGY | Facility: HOSPITAL | Age: 56
End: 2021-03-31

## 2021-03-31 ENCOUNTER — ANESTHESIA (OUTPATIENT)
Dept: PERIOP | Facility: HOSPITAL | Age: 56
End: 2021-03-31

## 2021-03-31 LAB
ACT BLD: 109 SECONDS (ref 89–137)
ACT BLD: 362 SECONDS (ref 89–137)
ACT BLD: 411 SECONDS (ref 89–137)
ACT BLD: 433 SECONDS (ref 89–137)
ACT BLD: 98 SECONDS (ref 89–137)
ALBUMIN SERPL-MCNC: 4.2 G/DL (ref 3.5–5.2)
ALBUMIN SERPL-MCNC: 4.3 G/DL (ref 3.5–5.2)
ALBUMIN/GLOB SERPL: 1.5 G/DL
ALP SERPL-CCNC: 98 U/L (ref 39–117)
ALT SERPL W P-5'-P-CCNC: 39 U/L (ref 1–41)
ANION GAP SERPL CALCULATED.3IONS-SCNC: 11 MMOL/L (ref 5–15)
ANION GAP SERPL CALCULATED.3IONS-SCNC: 12 MMOL/L (ref 5–15)
APTT PPP: 26.5 SECONDS (ref 24–31)
APTT PPP: 40.2 SECONDS (ref 61–76.5)
ARTERIAL PATENCY WRIST A: ABNORMAL
AST SERPL-CCNC: 32 U/L (ref 1–40)
ATMOSPHERIC PRESS: ABNORMAL MM[HG]
BASE DEFICIT: ABNORMAL
BASE EXCESS BLDA CALC-SCNC: -0.6 MMOL/L (ref 0–3)
BASE EXCESS BLDA CALC-SCNC: -1.2 MMOL/L (ref 0–3)
BASE EXCESS BLDA CALC-SCNC: -1.3 MMOL/L (ref 0–3)
BASE EXCESS BLDA CALC-SCNC: -1.6 MMOL/L (ref 0–3)
BASE EXCESS BLDA CALC-SCNC: -1.6 MMOL/L (ref 0–3)
BASE EXCESS BLDA CALC-SCNC: -1.7 MMOL/L (ref 0–3)
BASE EXCESS BLDA CALC-SCNC: -2.3 MMOL/L (ref 0–3)
BASE EXCESS BLDA CALC-SCNC: -2.3 MMOL/L (ref 0–3)
BASE EXCESS BLDA CALC-SCNC: -2.6 MMOL/L (ref 0–3)
BASE EXCESS BLDA CALC-SCNC: -2.6 MMOL/L (ref 0–3)
BASE EXCESS BLDA CALC-SCNC: 4 MMOL/L (ref 0–3)
BASE EXCESS BLDA CALC-SCNC: 6 MMOL/L (ref 0–3)
BASE EXCESS BLDA CALC-SCNC: <0 MMOL/L (ref 0–3)
BASE EXCESS BLDA CALC-SCNC: <0 MMOL/L (ref 0–3)
BASE EXCESS BLDV CALC-SCNC: ABNORMAL MMOL/L
BASOPHILS # BLD AUTO: 0.1 10*3/MM3 (ref 0–0.2)
BASOPHILS NFR BLD AUTO: 0.4 % (ref 0–1.5)
BDY SITE: ABNORMAL
BH BB BLOOD EXPIRATION DATE: NORMAL
BH BB BLOOD EXPIRATION DATE: NORMAL
BH BB BLOOD TYPE BARCODE: 5100
BH BB BLOOD TYPE BARCODE: 5100
BH BB DISPENSE STATUS: NORMAL
BH BB DISPENSE STATUS: NORMAL
BH BB PRODUCT CODE: NORMAL
BH BB PRODUCT CODE: NORMAL
BH BB UNIT NUMBER: NORMAL
BH BB UNIT NUMBER: NORMAL
BILIRUB SERPL-MCNC: 0.5 MG/DL (ref 0–1.2)
BUN SERPL-MCNC: 15 MG/DL (ref 6–20)
BUN SERPL-MCNC: 16 MG/DL (ref 6–20)
BUN/CREAT SERPL: 15 (ref 7–25)
BUN/CREAT SERPL: 16.3 (ref 7–25)
CA-I BLDA-SCNC: 1.04 MMOL/L (ref 1.12–1.32)
CA-I BLDA-SCNC: 1.06 MMOL/L (ref 1.12–1.32)
CA-I BLDA-SCNC: 1.1 MMOL/L (ref 1.15–1.33)
CA-I BLDA-SCNC: 1.12 MMOL/L (ref 1.12–1.32)
CA-I BLDA-SCNC: 1.14 MMOL/L (ref 1.15–1.33)
CA-I BLDA-SCNC: 1.15 MMOL/L (ref 1.15–1.33)
CA-I BLDA-SCNC: 1.15 MMOL/L (ref 1.15–1.33)
CA-I BLDA-SCNC: 1.16 MMOL/L (ref 1.15–1.33)
CA-I BLDA-SCNC: 1.22 MMOL/L (ref 1.15–1.33)
CA-I BLDA-SCNC: 1.23 MMOL/L (ref 1.15–1.33)
CA-I BLDA-SCNC: 1.24 MMOL/L (ref 1.15–1.33)
CA-I BLDA-SCNC: 1.29 MMOL/L (ref 1.12–1.32)
CA-I BLDA-SCNC: 1.3 MMOL/L (ref 1.15–1.33)
CA-I BLDA-SCNC: 1.3 MMOL/L (ref 1.15–1.33)
CA-I BLDA-SCNC: 1.53 MMOL/L (ref 1.12–1.32)
CA-I SERPL ISE-MCNC: 1.25 MMOL/L (ref 1.2–1.3)
CALCIUM SPEC-SCNC: 9.2 MG/DL (ref 8.6–10.5)
CALCIUM SPEC-SCNC: 9.3 MG/DL (ref 8.6–10.5)
CHLORIDE SERPL-SCNC: 104 MMOL/L (ref 98–107)
CHLORIDE SERPL-SCNC: 107 MMOL/L (ref 98–107)
CHOLEST SERPL-MCNC: 196 MG/DL (ref 0–200)
CLOSE TME COLL+ADP + EPINEP PNL BLD: 72 % (ref 86–100)
CO2 BLDA-SCNC: 23 MMOL/L (ref 23–27)
CO2 BLDA-SCNC: 23.9 MMOL/L (ref 22–29)
CO2 BLDA-SCNC: 24.9 MMOL/L (ref 22–29)
CO2 BLDA-SCNC: 25.1 MMOL/L (ref 22–29)
CO2 BLDA-SCNC: 25.1 MMOL/L (ref 22–29)
CO2 BLDA-SCNC: 25.4 MMOL/L (ref 22–29)
CO2 BLDA-SCNC: 25.4 MMOL/L (ref 22–29)
CO2 BLDA-SCNC: 25.8 MMOL/L (ref 22–29)
CO2 BLDA-SCNC: 26 MMOL/L (ref 23–27)
CO2 BLDA-SCNC: 26.2 MMOL/L (ref 22–29)
CO2 BLDA-SCNC: 26.4 MMOL/L (ref 22–29)
CO2 BLDA-SCNC: 27.8 MMOL/L (ref 22–29)
CO2 BLDA-SCNC: 32 MMOL/L (ref 23–27)
CO2 BLDA-SCNC: 33 MMOL/L (ref 23–27)
CO2 CONTENT VENOUS: ABNORMAL
CO2 SERPL-SCNC: 22 MMOL/L (ref 22–29)
CO2 SERPL-SCNC: 22 MMOL/L (ref 22–29)
CREAT SERPL-MCNC: 0.98 MG/DL (ref 0.76–1.27)
CREAT SERPL-MCNC: 1 MG/DL (ref 0.76–1.27)
CROSSMATCH INTERPRETATION: NORMAL
CROSSMATCH INTERPRETATION: NORMAL
DEPRECATED RDW RBC AUTO: 42.9 FL (ref 37–54)
DEPRECATED RDW RBC AUTO: 43.8 FL (ref 37–54)
EOSINOPHIL # BLD AUTO: 0 10*3/MM3 (ref 0–0.4)
EOSINOPHIL NFR BLD AUTO: 0.1 % (ref 0.3–6.2)
ERYTHROCYTE [DISTWIDTH] IN BLOOD BY AUTOMATED COUNT: 12.9 % (ref 12.3–15.4)
ERYTHROCYTE [DISTWIDTH] IN BLOOD BY AUTOMATED COUNT: 13.2 % (ref 12.3–15.4)
GFR SERPL CREATININE-BSD FRML MDRD: 77 ML/MIN/1.73
GFR SERPL CREATININE-BSD FRML MDRD: 79 ML/MIN/1.73
GLOBULIN UR ELPH-MCNC: 2.8 GM/DL
GLUCOSE BLDC GLUCOMTR-MCNC: 121 MG/DL (ref 70–105)
GLUCOSE BLDC GLUCOMTR-MCNC: 123 MG/DL (ref 70–105)
GLUCOSE BLDC GLUCOMTR-MCNC: 124 MG/DL (ref 70–105)
GLUCOSE BLDC GLUCOMTR-MCNC: 124 MG/DL (ref 74–100)
GLUCOSE BLDC GLUCOMTR-MCNC: 124 MG/DL (ref 74–100)
GLUCOSE BLDC GLUCOMTR-MCNC: 125 MG/DL (ref 70–105)
GLUCOSE BLDC GLUCOMTR-MCNC: 133 MG/DL (ref 74–100)
GLUCOSE BLDC GLUCOMTR-MCNC: 133 MG/DL (ref 74–100)
GLUCOSE BLDC GLUCOMTR-MCNC: 135 MG/DL (ref 70–105)
GLUCOSE BLDC GLUCOMTR-MCNC: 136 MG/DL (ref 74–100)
GLUCOSE BLDC GLUCOMTR-MCNC: 136 MG/DL (ref 74–100)
GLUCOSE BLDC GLUCOMTR-MCNC: 137 MG/DL (ref 74–100)
GLUCOSE BLDC GLUCOMTR-MCNC: 137 MG/DL (ref 74–100)
GLUCOSE BLDC GLUCOMTR-MCNC: 139 MG/DL (ref 70–105)
GLUCOSE BLDC GLUCOMTR-MCNC: 139 MG/DL (ref 74–100)
GLUCOSE BLDC GLUCOMTR-MCNC: 139 MG/DL (ref 74–100)
GLUCOSE BLDC GLUCOMTR-MCNC: 141 MG/DL (ref 74–100)
GLUCOSE BLDC GLUCOMTR-MCNC: 141 MG/DL (ref 74–100)
GLUCOSE BLDC GLUCOMTR-MCNC: 142 MG/DL (ref 70–105)
GLUCOSE BLDC GLUCOMTR-MCNC: 143 MG/DL (ref 74–100)
GLUCOSE BLDC GLUCOMTR-MCNC: 143 MG/DL (ref 74–100)
GLUCOSE BLDC GLUCOMTR-MCNC: 144 MG/DL (ref 74–100)
GLUCOSE BLDC GLUCOMTR-MCNC: 149 MG/DL (ref 74–100)
GLUCOSE BLDC GLUCOMTR-MCNC: 149 MG/DL (ref 74–100)
GLUCOSE BLDC GLUCOMTR-MCNC: 156 MG/DL (ref 70–105)
GLUCOSE SERPL-MCNC: 140 MG/DL (ref 65–99)
GLUCOSE SERPL-MCNC: 144 MG/DL (ref 65–99)
HCO3 BLDA-SCNC: 22.1 MMOL/L (ref 22–26)
HCO3 BLDA-SCNC: 22.7 MMOL/L (ref 21–28)
HCO3 BLDA-SCNC: 23.6 MMOL/L (ref 21–28)
HCO3 BLDA-SCNC: 23.7 MMOL/L (ref 21–28)
HCO3 BLDA-SCNC: 23.8 MMOL/L (ref 21–28)
HCO3 BLDA-SCNC: 23.9 MMOL/L (ref 21–28)
HCO3 BLDA-SCNC: 24.1 MMOL/L (ref 21–28)
HCO3 BLDA-SCNC: 24.5 MMOL/L (ref 21–28)
HCO3 BLDA-SCNC: 24.7 MMOL/L (ref 21–28)
HCO3 BLDA-SCNC: 24.8 MMOL/L (ref 22–26)
HCO3 BLDA-SCNC: 25 MMOL/L (ref 21–28)
HCO3 BLDA-SCNC: 26.1 MMOL/L (ref 21–28)
HCO3 BLDA-SCNC: 30 MMOL/L (ref 22–26)
HCO3 BLDA-SCNC: 31 MMOL/L (ref 22–26)
HCO3 BLDV-SCNC: 26.5 MMOL/L (ref 23–28)
HCT VFR BLD AUTO: 36.8 % (ref 37.5–51)
HCT VFR BLD AUTO: 41.5 % (ref 37.5–51)
HCT VFR BLDA CALC: 28 % (ref 38–51)
HCT VFR BLDA CALC: 29 % (ref 38–51)
HCT VFR BLDA CALC: 30 % (ref 38–51)
HCT VFR BLDA CALC: 31 % (ref 38–51)
HCT VFR BLDA CALC: 32 % (ref 38–51)
HCT VFR BLDA CALC: 33 % (ref 38–51)
HCT VFR BLDA CALC: 33 % (ref 38–51)
HCT VFR BLDA CALC: 35 % (ref 38–51)
HCT VFR BLDA CALC: 36 % (ref 38–51)
HCT VFR BLDA CALC: 36 % (ref 38–51)
HCT VFR BLDA CALC: 39 % (ref 38–51)
HDLC SERPL-MCNC: 48 MG/DL (ref 40–60)
HEMODILUTION: NO
HGB BLD-MCNC: 12.7 G/DL (ref 13–17.7)
HGB BLD-MCNC: 14.7 G/DL (ref 13–17.7)
HGB BLDA-MCNC: 10 G/DL (ref 12–17)
HGB BLDA-MCNC: 10.2 G/DL (ref 12–17)
HGB BLDA-MCNC: 10.4 G/DL (ref 12–17)
HGB BLDA-MCNC: 10.9 G/DL (ref 12–17)
HGB BLDA-MCNC: 10.9 G/DL (ref 12–17)
HGB BLDA-MCNC: 11 G/DL (ref 12–17)
HGB BLDA-MCNC: 11.2 G/DL (ref 12–17)
HGB BLDA-MCNC: 11.2 G/DL (ref 12–17)
HGB BLDA-MCNC: 12 G/DL (ref 12–17)
HGB BLDA-MCNC: 12.1 G/DL (ref 12–17)
HGB BLDA-MCNC: 12.2 G/DL (ref 12–17)
HGB BLDA-MCNC: 13.3 G/DL (ref 12–17)
HGB BLDA-MCNC: 9.6 G/DL (ref 12–17)
INHALED O2 CONCENTRATION: 40 %
INHALED O2 CONCENTRATION: 50 %
INHALED O2 CONCENTRATION: 50 %
INHALED O2 CONCENTRATION: 70 %
INHALED O2 CONCENTRATION: 70 %
INHALED O2 CONCENTRATION: <21 %
INR PPP: 1.12 (ref 0.93–1.1)
LDLC SERPL CALC-MCNC: 128 MG/DL (ref 0–100)
LDLC/HDLC SERPL: 2.62 {RATIO}
LYMPHOCYTES # BLD AUTO: 1 10*3/MM3 (ref 0.7–3.1)
LYMPHOCYTES NFR BLD AUTO: 7 % (ref 19.6–45.3)
MCH RBC QN AUTO: 32.6 PG (ref 26.6–33)
MCH RBC QN AUTO: 33.4 PG (ref 26.6–33)
MCHC RBC AUTO-ENTMCNC: 34.5 G/DL (ref 31.5–35.7)
MCHC RBC AUTO-ENTMCNC: 35.4 G/DL (ref 31.5–35.7)
MCV RBC AUTO: 94.2 FL (ref 79–97)
MCV RBC AUTO: 94.7 FL (ref 79–97)
MODALITY: ABNORMAL
MONOCYTES # BLD AUTO: 1.1 10*3/MM3 (ref 0.1–0.9)
MONOCYTES NFR BLD AUTO: 7.6 % (ref 5–12)
MRSA DNA SPEC QL NAA+PROBE: NORMAL
NEUTROPHILS NFR BLD AUTO: 12.5 10*3/MM3 (ref 1.7–7)
NEUTROPHILS NFR BLD AUTO: 84.9 % (ref 42.7–76)
NRBC BLD AUTO-RTO: 0 /100 WBC (ref 0–0.2)
PCO2 BLDA: 39 MM HG (ref 35–48)
PCO2 BLDA: 41.3 MM HG (ref 35–48)
PCO2 BLDA: 41.7 MM HG (ref 35–48)
PCO2 BLDA: 42.1 MM HG (ref 35–48)
PCO2 BLDA: 44.4 MM HG (ref 35–48)
PCO2 BLDA: 44.6 MM HG (ref 35–48)
PCO2 BLDA: 45.8 MM HG (ref 35–48)
PCO2 BLDA: 46.3 MM HG (ref 35–45)
PCO2 BLDA: 46.9 MM HG (ref 35–45)
PCO2 BLDA: 47.2 MM HG (ref 35–48)
PCO2 BLDA: 50.6 MM HG (ref 35–48)
PCO2 BLDA: 53.8 MM HG (ref 35–45)
PCO2 BLDA: 55.8 MM HG (ref 35–45)
PCO2 BLDA: 57.2 MM HG (ref 35–48)
PCO2 BLDV: 47.3 MM HG (ref 41–51)
PEEP RESPIRATORY: 10 CM[H2O]
PEEP RESPIRATORY: 5 CM[H2O]
PEEP RESPIRATORY: 8 CM[H2O]
PH BLDA: 7.27 PH UNITS (ref 7.35–7.45)
PH BLDA: 7.28 PH UNITS (ref 7.35–7.45)
PH BLDA: 7.3 PH UNITS (ref 7.35–7.45)
PH BLDA: 7.31 PH UNITS (ref 7.35–7.45)
PH BLDA: 7.32 PH UNITS (ref 7.35–7.45)
PH BLDA: 7.34 PH UNITS (ref 7.35–7.45)
PH BLDA: 7.34 PH UNITS (ref 7.35–7.45)
PH BLDA: 7.35 PH UNITS (ref 7.35–7.45)
PH BLDA: 7.36 PH UNITS (ref 7.35–7.45)
PH BLDA: 7.36 PH UNITS (ref 7.35–7.45)
PH BLDA: 7.37 PH UNITS (ref 7.35–7.45)
PH BLDV: 7.36 PH UNITS (ref 7.31–7.41)
PHOSPHATE SERPL-MCNC: 3.5 MG/DL (ref 2.5–4.5)
PLATELET # BLD AUTO: 122 10*3/MM3 (ref 140–450)
PLATELET # BLD AUTO: 217 10*3/MM3 (ref 140–450)
PMV BLD AUTO: 6.6 FL (ref 6–12)
PMV BLD AUTO: 6.9 FL (ref 6–12)
PO2 BLDA: 105.8 MM HG (ref 83–108)
PO2 BLDA: 127.3 MM HG (ref 83–108)
PO2 BLDA: 221 MM HG (ref 80–105)
PO2 BLDA: 249 MM HG (ref 80–105)
PO2 BLDA: 375 MM HG (ref 80–105)
PO2 BLDA: 425 MM HG (ref 80–105)
PO2 BLDA: 70.4 MM HG (ref 83–108)
PO2 BLDA: 81.7 MM HG (ref 83–108)
PO2 BLDA: 85.7 MM HG (ref 83–108)
PO2 BLDA: 88.5 MM HG (ref 83–108)
PO2 BLDA: 90.3 MM HG (ref 83–108)
PO2 BLDA: 90.5 MM HG (ref 83–108)
PO2 BLDA: 94.8 MM HG (ref 83–108)
PO2 BLDA: 97.6 MM HG (ref 83–108)
PO2 BLDV: 52 MM HG (ref 35–42)
POTASSIUM BLDA-SCNC: 3.8 MMOL/L (ref 3.5–4.5)
POTASSIUM BLDA-SCNC: 3.9 MMOL/L (ref 3.5–4.5)
POTASSIUM BLDA-SCNC: 4 MMOL/L (ref 3.5–4.5)
POTASSIUM BLDA-SCNC: 4.1 MMOL/L (ref 3.5–4.5)
POTASSIUM BLDA-SCNC: 4.1 MMOL/L (ref 3.5–4.5)
POTASSIUM BLDA-SCNC: 4.2 MMOL/L (ref 3.5–4.5)
POTASSIUM BLDA-SCNC: 4.2 MMOL/L (ref 3.5–4.5)
POTASSIUM BLDA-SCNC: 4.3 MMOL/L (ref 3.5–4.5)
POTASSIUM BLDA-SCNC: 4.3 MMOL/L (ref 3.5–4.5)
POTASSIUM BLDA-SCNC: 4.3 MMOL/L (ref 3.5–4.9)
POTASSIUM BLDA-SCNC: 4.5 MMOL/L (ref 3.5–4.5)
POTASSIUM BLDA-SCNC: 5 MMOL/L (ref 3.5–4.9)
POTASSIUM BLDA-SCNC: 5.2 MMOL/L (ref 3.5–4.9)
POTASSIUM BLDA-SCNC: 5.3 MMOL/L (ref 3.5–4.9)
POTASSIUM BLDA-SCNC: 6.9 MMOL/L (ref 3.5–4.9)
POTASSIUM SERPL-SCNC: 4.3 MMOL/L (ref 3.5–5.2)
POTASSIUM SERPL-SCNC: 4.3 MMOL/L (ref 3.5–5.2)
PROT SERPL-MCNC: 7 G/DL (ref 6–8.5)
PROTHROMBIN TIME: 12.3 SECONDS (ref 9.6–11.7)
PSV: 10 CMH2O
PSV: 10 CMH2O
RBC # BLD AUTO: 3.88 10*6/MM3 (ref 4.14–5.8)
RBC # BLD AUTO: 4.4 10*6/MM3 (ref 4.14–5.8)
RESPIRATORY RATE: 12
RESPIRATORY RATE: 12
RESPIRATORY RATE: 16
RESPIRATORY RATE: 18
SAO2 % BLDCOA: 100 % (ref 95–98)
SAO2 % BLDCOA: 93 % (ref 94–98)
SAO2 % BLDCOA: 93.9 % (ref 94–98)
SAO2 % BLDCOA: 95.8 % (ref 94–98)
SAO2 % BLDCOA: 95.9 % (ref 94–98)
SAO2 % BLDCOA: 96.2 % (ref 94–98)
SAO2 % BLDCOA: 96.7 % (ref 94–98)
SAO2 % BLDCOA: 97.1 % (ref 94–98)
SAO2 % BLDCOA: 97.3 % (ref 94–98)
SAO2 % BLDCOA: 98 % (ref 94–98)
SAO2 % BLDCOA: 98.4 % (ref 94–98)
SAO2 % BLDCOV: 28 % (ref 95–99)
SODIUM BLD-SCNC: 133 MMOL/L (ref 138–146)
SODIUM BLD-SCNC: 133 MMOL/L (ref 138–146)
SODIUM BLD-SCNC: 134 MMOL/L (ref 138–146)
SODIUM BLD-SCNC: 134 MMOL/L (ref 138–146)
SODIUM BLD-SCNC: 139 MMOL/L (ref 138–146)
SODIUM BLD-SCNC: 140 MMOL/L (ref 138–146)
SODIUM BLD-SCNC: 140 MMOL/L (ref 138–146)
SODIUM BLD-SCNC: 141 MMOL/L (ref 138–146)
SODIUM BLD-SCNC: 142 MMOL/L (ref 138–146)
SODIUM BLD-SCNC: 143 MMOL/L (ref 138–146)
SODIUM SERPL-SCNC: 138 MMOL/L (ref 136–145)
SODIUM SERPL-SCNC: 140 MMOL/L (ref 136–145)
TRIGL SERPL-MCNC: 112 MG/DL (ref 0–150)
UNIT  ABO: NORMAL
UNIT  ABO: NORMAL
UNIT  RH: NORMAL
UNIT  RH: NORMAL
VENTILATOR MODE: ABNORMAL
VLDLC SERPL-MCNC: 20 MG/DL (ref 5–40)
VT ON VENT VENT: 600 ML
WBC # BLD AUTO: 13.1 10*3/MM3 (ref 3.4–10.8)
WBC # BLD AUTO: 14.7 10*3/MM3 (ref 3.4–10.8)

## 2021-03-31 PROCEDURE — C1713 ANCHOR/SCREW BN/BN,TIS/BN: HCPCS | Performed by: THORACIC SURGERY (CARDIOTHORACIC VASCULAR SURGERY)

## 2021-03-31 PROCEDURE — 25010000002 MAGNESIUM SULFATE IN D5W 1G/100ML (PREMIX) 1-5 GM/100ML-% SOLUTION: Performed by: NURSE PRACTITIONER

## 2021-03-31 PROCEDURE — 33533 CABG ARTERIAL SINGLE: CPT | Performed by: SPECIALIST/TECHNOLOGIST, OTHER

## 2021-03-31 PROCEDURE — 25010000002 MIDAZOLAM PER 1 MG: Performed by: ANESTHESIOLOGY

## 2021-03-31 PROCEDURE — 87641 MR-STAPH DNA AMP PROBE: CPT | Performed by: NURSE PRACTITIONER

## 2021-03-31 PROCEDURE — 93318 ECHO TRANSESOPHAGEAL INTRAOP: CPT | Performed by: ANESTHESIOLOGY

## 2021-03-31 PROCEDURE — 25010000002 HEPARIN (PORCINE) 1000-0.9 UT/500ML-% SOLUTION: Performed by: ANESTHESIOLOGY

## 2021-03-31 PROCEDURE — 25010000002 PAPAVERINE PER 60 MG: Performed by: THORACIC SURGERY (CARDIOTHORACIC VASCULAR SURGERY)

## 2021-03-31 PROCEDURE — 94799 UNLISTED PULMONARY SVC/PX: CPT

## 2021-03-31 PROCEDURE — 25010000002 HEPARIN (PORCINE) PER 1000 UNITS: Performed by: THORACIC SURGERY (CARDIOTHORACIC VASCULAR SURGERY)

## 2021-03-31 PROCEDURE — 99232 SBSQ HOSP IP/OBS MODERATE 35: CPT | Performed by: INTERNAL MEDICINE

## 2021-03-31 PROCEDURE — 82803 BLOOD GASES ANY COMBINATION: CPT

## 2021-03-31 PROCEDURE — 33519 CABG ARTERY-VEIN THREE: CPT | Performed by: THORACIC SURGERY (CARDIOTHORACIC VASCULAR SURGERY)

## 2021-03-31 PROCEDURE — 82330 ASSAY OF CALCIUM: CPT

## 2021-03-31 PROCEDURE — 71045 X-RAY EXAM CHEST 1 VIEW: CPT

## 2021-03-31 PROCEDURE — 80053 COMPREHEN METABOLIC PANEL: CPT | Performed by: THORACIC SURGERY (CARDIOTHORACIC VASCULAR SURGERY)

## 2021-03-31 PROCEDURE — 25010000002 MAGNESIUM SULFATE PER 500 MG OF MAGNESIUM: Performed by: ANESTHESIOLOGY

## 2021-03-31 PROCEDURE — 25010000002 ALBUMIN HUMAN 5% PER 50 ML

## 2021-03-31 PROCEDURE — C1889 IMPLANT/INSERT DEVICE, NOC: HCPCS | Performed by: THORACIC SURGERY (CARDIOTHORACIC VASCULAR SURGERY)

## 2021-03-31 PROCEDURE — 85347 COAGULATION TIME ACTIVATED: CPT

## 2021-03-31 PROCEDURE — 84132 ASSAY OF SERUM POTASSIUM: CPT

## 2021-03-31 PROCEDURE — 93005 ELECTROCARDIOGRAM TRACING: CPT | Performed by: NURSE PRACTITIONER

## 2021-03-31 PROCEDURE — 82962 GLUCOSE BLOOD TEST: CPT

## 2021-03-31 PROCEDURE — 25010000002 PROPOFOL 200 MG/20ML EMULSION: Performed by: ANESTHESIOLOGY

## 2021-03-31 PROCEDURE — 85027 COMPLETE CBC AUTOMATED: CPT | Performed by: THORACIC SURGERY (CARDIOTHORACIC VASCULAR SURGERY)

## 2021-03-31 PROCEDURE — 85610 PROTHROMBIN TIME: CPT | Performed by: NURSE PRACTITIONER

## 2021-03-31 PROCEDURE — 80061 LIPID PANEL: CPT | Performed by: THORACIC SURGERY (CARDIOTHORACIC VASCULAR SURGERY)

## 2021-03-31 PROCEDURE — 85730 THROMBOPLASTIN TIME PARTIAL: CPT | Performed by: NURSE PRACTITIONER

## 2021-03-31 PROCEDURE — 5A1221Z PERFORMANCE OF CARDIAC OUTPUT, CONTINUOUS: ICD-10-PCS | Performed by: THORACIC SURGERY (CARDIOTHORACIC VASCULAR SURGERY)

## 2021-03-31 PROCEDURE — 06BQ4ZZ EXCISION OF LEFT SAPHENOUS VEIN, PERCUTANEOUS ENDOSCOPIC APPROACH: ICD-10-PCS | Performed by: THORACIC SURGERY (CARDIOTHORACIC VASCULAR SURGERY)

## 2021-03-31 PROCEDURE — P9041 ALBUMIN (HUMAN),5%, 50ML: HCPCS

## 2021-03-31 PROCEDURE — C1751 CATH, INF, PER/CENT/MIDLINE: HCPCS | Performed by: ANESTHESIOLOGY

## 2021-03-31 PROCEDURE — 85730 THROMBOPLASTIN TIME PARTIAL: CPT | Performed by: INTERNAL MEDICINE

## 2021-03-31 PROCEDURE — 85018 HEMOGLOBIN: CPT

## 2021-03-31 PROCEDURE — 94002 VENT MGMT INPAT INIT DAY: CPT

## 2021-03-31 PROCEDURE — 94760 N-INVAS EAR/PLS OXIMETRY 1: CPT

## 2021-03-31 PROCEDURE — 82947 ASSAY GLUCOSE BLOOD QUANT: CPT

## 2021-03-31 PROCEDURE — 85014 HEMATOCRIT: CPT

## 2021-03-31 PROCEDURE — 33519 CABG ARTERY-VEIN THREE: CPT | Performed by: SPECIALIST/TECHNOLOGIST, OTHER

## 2021-03-31 PROCEDURE — 85025 COMPLETE CBC W/AUTO DIFF WBC: CPT | Performed by: NURSE PRACTITIONER

## 2021-03-31 PROCEDURE — 85576 BLOOD PLATELET AGGREGATION: CPT | Performed by: THORACIC SURGERY (CARDIOTHORACIC VASCULAR SURGERY)

## 2021-03-31 PROCEDURE — 63710000001 INSULIN REGULAR HUMAN PER 5 UNITS: Performed by: ANESTHESIOLOGY

## 2021-03-31 PROCEDURE — 021209W BYPASS CORONARY ARTERY, THREE ARTERIES FROM AORTA WITH AUTOLOGOUS VENOUS TISSUE, OPEN APPROACH: ICD-10-PCS | Performed by: THORACIC SURGERY (CARDIOTHORACIC VASCULAR SURGERY)

## 2021-03-31 PROCEDURE — 25010000002 MORPHINE PER 10 MG: Performed by: NURSE PRACTITIONER

## 2021-03-31 PROCEDURE — P9041 ALBUMIN (HUMAN),5%, 50ML: HCPCS | Performed by: NURSE PRACTITIONER

## 2021-03-31 PROCEDURE — 84295 ASSAY OF SERUM SODIUM: CPT

## 2021-03-31 PROCEDURE — 80051 ELECTROLYTE PANEL: CPT

## 2021-03-31 PROCEDURE — 25010000003 POTASSIUM CHLORIDE 10 MEQ/100ML SOLUTION

## 2021-03-31 PROCEDURE — 25010000002 CEFAZOLIN PER 500 MG: Performed by: NURSE PRACTITIONER

## 2021-03-31 PROCEDURE — C1729 CATH, DRAINAGE: HCPCS | Performed by: THORACIC SURGERY (CARDIOTHORACIC VASCULAR SURGERY)

## 2021-03-31 PROCEDURE — 33533 CABG ARTERIAL SINGLE: CPT | Performed by: THORACIC SURGERY (CARDIOTHORACIC VASCULAR SURGERY)

## 2021-03-31 PROCEDURE — 25010000003 CEFAZOLIN PER 500 MG: Performed by: ANESTHESIOLOGY

## 2021-03-31 PROCEDURE — A4648 IMPLANTABLE TISSUE MARKER: HCPCS | Performed by: THORACIC SURGERY (CARDIOTHORACIC VASCULAR SURGERY)

## 2021-03-31 PROCEDURE — 80069 RENAL FUNCTION PANEL: CPT | Performed by: NURSE PRACTITIONER

## 2021-03-31 PROCEDURE — 25010000002 PROTAMINE SULFATE PER 10 MG: Performed by: ANESTHESIOLOGY

## 2021-03-31 PROCEDURE — 33508 ENDOSCOPIC VEIN HARVEST: CPT | Performed by: THORACIC SURGERY (CARDIOTHORACIC VASCULAR SURGERY)

## 2021-03-31 PROCEDURE — 25010000002 ALBUMIN HUMAN 5% PER 50 ML: Performed by: NURSE PRACTITIONER

## 2021-03-31 PROCEDURE — 82330 ASSAY OF CALCIUM: CPT | Performed by: NURSE PRACTITIONER

## 2021-03-31 PROCEDURE — 25010000002 EPINEPHRINE 30 MG/30ML SOLUTION 30 ML VIAL: Performed by: THORACIC SURGERY (CARDIOTHORACIC VASCULAR SURGERY)

## 2021-03-31 PROCEDURE — 02100Z9 BYPASS CORONARY ARTERY, ONE ARTERY FROM LEFT INTERNAL MAMMARY, OPEN APPROACH: ICD-10-PCS | Performed by: THORACIC SURGERY (CARDIOTHORACIC VASCULAR SURGERY)

## 2021-03-31 PROCEDURE — 33508 ENDOSCOPIC VEIN HARVEST: CPT | Performed by: SPECIALIST/TECHNOLOGIST, OTHER

## 2021-03-31 PROCEDURE — 25010000002 HEPARIN (PORCINE) PER 1000 UNITS: Performed by: ANESTHESIOLOGY

## 2021-03-31 PROCEDURE — 25010000002 FENTANYL CITRATE (PF) 250 MCG/5ML SOLUTION: Performed by: ANESTHESIOLOGY

## 2021-03-31 DEVICE — ABSORBABLE HEMOSTAT (OXIDIZED REGENERATED CELLULOSE, U.S.P.)
Type: IMPLANTABLE DEVICE | Site: CHEST | Status: FUNCTIONAL
Brand: SURGICEL

## 2021-03-31 DEVICE — DEV CONTRL TISS STRATAFIXSPIRALMNCRYL PLSPS2 REV3/0 15CM: Type: IMPLANTABLE DEVICE | Site: LEG | Status: FUNCTIONAL

## 2021-03-31 DEVICE — DEV CONTRL TISS STRATAFIX SPIRAL MNCRYL UD 3/0 PLS 30CM: Type: IMPLANTABLE DEVICE | Site: CHEST | Status: FUNCTIONAL

## 2021-03-31 DEVICE — SS SUTURE, 6 PER SLEEVE
Type: IMPLANTABLE DEVICE | Site: STERNUM | Status: FUNCTIONAL
Brand: MYO/WIRE II

## 2021-03-31 RX ORDER — MIDAZOLAM HYDROCHLORIDE 1 MG/ML
INJECTION INTRAMUSCULAR; INTRAVENOUS AS NEEDED
Status: DISCONTINUED | OUTPATIENT
Start: 2021-03-31 | End: 2021-03-31 | Stop reason: SURG

## 2021-03-31 RX ORDER — PROPOFOL 10 MG/ML
INJECTION, EMULSION INTRAVENOUS AS NEEDED
Status: DISCONTINUED | OUTPATIENT
Start: 2021-03-31 | End: 2021-03-31 | Stop reason: SURG

## 2021-03-31 RX ORDER — CHLORHEXIDINE GLUCONATE 0.12 MG/ML
15 RINSE ORAL EVERY 12 HOURS
Status: DISCONTINUED | OUTPATIENT
Start: 2021-03-31 | End: 2021-04-04 | Stop reason: HOSPADM

## 2021-03-31 RX ORDER — NICARDIPINE HYDROCHLORIDE 2.5 MG/ML
INJECTION INTRAVENOUS AS NEEDED
Status: DISCONTINUED | OUTPATIENT
Start: 2021-03-31 | End: 2021-03-31 | Stop reason: SURG

## 2021-03-31 RX ORDER — ALBUMIN, HUMAN INJ 5% 5 %
SOLUTION INTRAVENOUS
Status: COMPLETED
Start: 2021-03-31 | End: 2021-03-31

## 2021-03-31 RX ORDER — MAGNESIUM SULFATE 1 G/100ML
1 INJECTION INTRAVENOUS EVERY 8 HOURS
Status: COMPLETED | OUTPATIENT
Start: 2021-03-31 | End: 2021-04-01

## 2021-03-31 RX ORDER — ALBUMIN, HUMAN INJ 5% 5 %
250 SOLUTION INTRAVENOUS ONCE
Status: COMPLETED | OUTPATIENT
Start: 2021-03-31 | End: 2021-03-31

## 2021-03-31 RX ORDER — CEFAZOLIN SODIUM 1 G/3ML
INJECTION, POWDER, FOR SOLUTION INTRAMUSCULAR; INTRAVENOUS AS NEEDED
Status: DISCONTINUED | OUTPATIENT
Start: 2021-03-31 | End: 2021-03-31 | Stop reason: SURG

## 2021-03-31 RX ORDER — NITROGLYCERIN 10 MG/100ML
INJECTION INTRAVENOUS CONTINUOUS PRN
Status: DISCONTINUED | OUTPATIENT
Start: 2021-03-31 | End: 2021-03-31 | Stop reason: SURG

## 2021-03-31 RX ORDER — HYDROCODONE BITARTRATE AND ACETAMINOPHEN 5; 325 MG/1; MG/1
1 TABLET ORAL EVERY 4 HOURS PRN
Status: DISCONTINUED | OUTPATIENT
Start: 2021-03-31 | End: 2021-04-04 | Stop reason: HOSPADM

## 2021-03-31 RX ORDER — NITROGLYCERIN 5 MG/ML
INJECTION, SOLUTION INTRAVENOUS AS NEEDED
Status: DISCONTINUED | OUTPATIENT
Start: 2021-03-31 | End: 2021-03-31 | Stop reason: SURG

## 2021-03-31 RX ORDER — HEPARIN SODIUM 200 [USP'U]/100ML
INJECTION, SOLUTION INTRAVENOUS
Status: COMPLETED | OUTPATIENT
Start: 2021-03-31 | End: 2021-03-31

## 2021-03-31 RX ORDER — ALBUMIN, HUMAN INJ 5% 5 %
500 SOLUTION INTRAVENOUS ONCE
Status: COMPLETED | OUTPATIENT
Start: 2021-03-31 | End: 2021-03-31

## 2021-03-31 RX ORDER — MAGNESIUM HYDROXIDE 1200 MG/15ML
LIQUID ORAL AS NEEDED
Status: DISCONTINUED | OUTPATIENT
Start: 2021-03-31 | End: 2021-03-31 | Stop reason: HOSPADM

## 2021-03-31 RX ORDER — ONDANSETRON 2 MG/ML
4 INJECTION INTRAMUSCULAR; INTRAVENOUS EVERY 6 HOURS PRN
Status: DISCONTINUED | OUTPATIENT
Start: 2021-03-31 | End: 2021-04-04 | Stop reason: HOSPADM

## 2021-03-31 RX ORDER — ALBUMIN, HUMAN INJ 5% 5 %
12.5 SOLUTION INTRAVENOUS AS NEEDED
Status: COMPLETED | OUTPATIENT
Start: 2021-03-31 | End: 2021-03-31

## 2021-03-31 RX ORDER — XYLITOL/YERBA SANTA
2 AEROSOL, SPRAY WITH PUMP (ML) MUCOUS MEMBRANE EVERY 4 HOURS PRN
Status: DISCONTINUED | OUTPATIENT
Start: 2021-03-31 | End: 2021-04-04 | Stop reason: HOSPADM

## 2021-03-31 RX ORDER — AMINOCAPROIC ACID 250 MG/ML
INJECTION, SOLUTION INTRAVENOUS AS NEEDED
Status: DISCONTINUED | OUTPATIENT
Start: 2021-03-31 | End: 2021-03-31 | Stop reason: SURG

## 2021-03-31 RX ORDER — DOCUSATE SODIUM 100 MG/1
100 CAPSULE, LIQUID FILLED ORAL 2 TIMES DAILY
Status: DISCONTINUED | OUTPATIENT
Start: 2021-04-01 | End: 2021-04-04 | Stop reason: HOSPADM

## 2021-03-31 RX ORDER — POTASSIUM CHLORIDE 7.45 MG/ML
INJECTION INTRAVENOUS
Status: COMPLETED
Start: 2021-03-31 | End: 2021-03-31

## 2021-03-31 RX ORDER — ASPIRIN 81 MG/1
81 TABLET ORAL DAILY
Status: DISCONTINUED | OUTPATIENT
Start: 2021-04-01 | End: 2021-04-04 | Stop reason: HOSPADM

## 2021-03-31 RX ORDER — SODIUM CHLORIDE 0.9 % (FLUSH) 0.9 %
30 SYRINGE (ML) INJECTION ONCE AS NEEDED
Status: DISCONTINUED | OUTPATIENT
Start: 2021-03-31 | End: 2021-03-31 | Stop reason: HOSPADM

## 2021-03-31 RX ORDER — POTASSIUM CHLORIDE 7.45 MG/ML
10 INJECTION INTRAVENOUS
Status: DISCONTINUED | OUTPATIENT
Start: 2021-03-31 | End: 2021-04-04 | Stop reason: HOSPADM

## 2021-03-31 RX ORDER — POLYETHYLENE GLYCOL 3350 17 G/17G
17 POWDER, FOR SOLUTION ORAL DAILY
Status: DISCONTINUED | OUTPATIENT
Start: 2021-04-01 | End: 2021-04-04 | Stop reason: HOSPADM

## 2021-03-31 RX ORDER — VECURONIUM BROMIDE 1 MG/ML
INJECTION, POWDER, LYOPHILIZED, FOR SOLUTION INTRAVENOUS AS NEEDED
Status: DISCONTINUED | OUTPATIENT
Start: 2021-03-31 | End: 2021-03-31 | Stop reason: SURG

## 2021-03-31 RX ORDER — ACETAMINOPHEN 500 MG
500 TABLET ORAL EVERY 6 HOURS
Status: DISPENSED | OUTPATIENT
Start: 2021-03-31 | End: 2021-04-01

## 2021-03-31 RX ORDER — POTASSIUM CHLORIDE 20 MEQ/1
20 TABLET, EXTENDED RELEASE ORAL AS NEEDED
Status: DISCONTINUED | OUTPATIENT
Start: 2021-04-01 | End: 2021-04-04 | Stop reason: HOSPADM

## 2021-03-31 RX ORDER — ATORVASTATIN CALCIUM 40 MG/1
40 TABLET, FILM COATED ORAL NIGHTLY
Status: DISCONTINUED | OUTPATIENT
Start: 2021-04-01 | End: 2021-04-04 | Stop reason: HOSPADM

## 2021-03-31 RX ORDER — ACETAMINOPHEN 650 MG/1
650 SUPPOSITORY RECTAL EVERY 6 HOURS
Status: ACTIVE | OUTPATIENT
Start: 2021-03-31 | End: 2021-04-01

## 2021-03-31 RX ORDER — NOREPINEPHRINE BIT/0.9 % NACL 8 MG/250ML
.02-.06 INFUSION BOTTLE (ML) INTRAVENOUS
Status: DISCONTINUED | OUTPATIENT
Start: 2021-03-31 | End: 2021-04-03

## 2021-03-31 RX ORDER — PANTOPRAZOLE SODIUM 40 MG/10ML
40 INJECTION, POWDER, LYOPHILIZED, FOR SOLUTION INTRAVENOUS ONCE
Status: COMPLETED | OUTPATIENT
Start: 2021-03-31 | End: 2021-03-31

## 2021-03-31 RX ORDER — SODIUM CHLORIDE 9 MG/ML
30 INJECTION, SOLUTION INTRAVENOUS CONTINUOUS
Status: DISCONTINUED | OUTPATIENT
Start: 2021-03-31 | End: 2021-04-04 | Stop reason: HOSPADM

## 2021-03-31 RX ORDER — DEXMEDETOMIDINE HYDROCHLORIDE 4 UG/ML
.2-1.5 INJECTION, SOLUTION INTRAVENOUS
Status: DISCONTINUED | OUTPATIENT
Start: 2021-03-31 | End: 2021-04-03

## 2021-03-31 RX ORDER — NITROGLYCERIN 20 MG/100ML
5-50 INJECTION INTRAVENOUS CONTINUOUS PRN
Status: DISCONTINUED | OUTPATIENT
Start: 2021-03-31 | End: 2021-04-03

## 2021-03-31 RX ORDER — NOREPINEPHRINE BIT/0.9 % NACL 8 MG/250ML
INFUSION BOTTLE (ML) INTRAVENOUS CONTINUOUS PRN
Status: DISCONTINUED | OUTPATIENT
Start: 2021-03-31 | End: 2021-03-31 | Stop reason: SURG

## 2021-03-31 RX ORDER — POTASSIUM CHLORIDE 1.5 G/1.77G
20 POWDER, FOR SOLUTION ORAL AS NEEDED
Status: DISCONTINUED | OUTPATIENT
Start: 2021-04-01 | End: 2021-04-04 | Stop reason: HOSPADM

## 2021-03-31 RX ORDER — MORPHINE SULFATE 4 MG/ML
2 INJECTION, SOLUTION INTRAMUSCULAR; INTRAVENOUS
Status: DISCONTINUED | OUTPATIENT
Start: 2021-03-31 | End: 2021-04-03

## 2021-03-31 RX ORDER — PANTOPRAZOLE SODIUM 40 MG/1
40 TABLET, DELAYED RELEASE ORAL
Status: DISCONTINUED | OUTPATIENT
Start: 2021-04-01 | End: 2021-04-04 | Stop reason: HOSPADM

## 2021-03-31 RX ORDER — FENTANYL CITRATE 50 UG/ML
INJECTION, SOLUTION INTRAMUSCULAR; INTRAVENOUS AS NEEDED
Status: DISCONTINUED | OUTPATIENT
Start: 2021-03-31 | End: 2021-03-31 | Stop reason: SURG

## 2021-03-31 RX ORDER — PHENYLEPHRINE HCL IN 0.9% NACL 1 MG/10 ML
SYRINGE (ML) INTRAVENOUS AS NEEDED
Status: DISCONTINUED | OUTPATIENT
Start: 2021-03-31 | End: 2021-03-31 | Stop reason: SURG

## 2021-03-31 RX ORDER — SENNA PLUS 8.6 MG/1
1 TABLET ORAL 2 TIMES DAILY
Status: DISCONTINUED | OUTPATIENT
Start: 2021-04-01 | End: 2021-04-04 | Stop reason: HOSPADM

## 2021-03-31 RX ORDER — ASPIRIN 325 MG
325 TABLET ORAL ONCE
Status: COMPLETED | OUTPATIENT
Start: 2021-03-31 | End: 2021-03-31

## 2021-03-31 RX ORDER — NALOXONE HCL 0.4 MG/ML
0.4 VIAL (ML) INJECTION
Status: DISCONTINUED | OUTPATIENT
Start: 2021-03-31 | End: 2021-04-04 | Stop reason: HOSPADM

## 2021-03-31 RX ORDER — HEPARIN SODIUM 1000 [USP'U]/ML
INJECTION, SOLUTION INTRAVENOUS; SUBCUTANEOUS AS NEEDED
Status: DISCONTINUED | OUTPATIENT
Start: 2021-03-31 | End: 2021-03-31 | Stop reason: SURG

## 2021-03-31 RX ORDER — SODIUM CHLORIDE 9 MG/ML
30 INJECTION, SOLUTION INTRAVENOUS CONTINUOUS PRN
Status: DISCONTINUED | OUTPATIENT
Start: 2021-03-31 | End: 2021-03-31 | Stop reason: HOSPADM

## 2021-03-31 RX ADMIN — MUPIROCIN 1 APPLICATION: 20 OINTMENT TOPICAL at 21:08

## 2021-03-31 RX ADMIN — CHLORHEXIDINE GLUCONATE 0.12% ORAL RINSE 15 ML: 1.2 LIQUID ORAL at 06:35

## 2021-03-31 RX ADMIN — VECURONIUM BROMIDE 14 MG: 10 INJECTION, POWDER, LYOPHILIZED, FOR SOLUTION INTRAVENOUS at 07:34

## 2021-03-31 RX ADMIN — MUPIROCIN 1 APPLICATION: 20 OINTMENT TOPICAL at 06:34

## 2021-03-31 RX ADMIN — NICARDIPINE HYDROCHLORIDE 1 MG: 2.5 INJECTION INTRAVENOUS at 10:29

## 2021-03-31 RX ADMIN — MIDAZOLAM 1 MG: 1 INJECTION INTRAMUSCULAR; INTRAVENOUS at 07:12

## 2021-03-31 RX ADMIN — PROPOFOL 50 MG: 10 INJECTION, EMULSION INTRAVENOUS at 07:34

## 2021-03-31 RX ADMIN — ALBUMIN HUMAN 250 ML: 0.05 INJECTION, SOLUTION INTRAVENOUS at 15:01

## 2021-03-31 RX ADMIN — ALBUMIN, HUMAN INJ 5% 500 ML: 5 SOLUTION at 18:20

## 2021-03-31 RX ADMIN — HEPARIN SODIUM IN SODIUM CHLORIDE 1000 UNITS: 200 INJECTION INTRAVENOUS at 08:09

## 2021-03-31 RX ADMIN — CEFAZOLIN SODIUM 2 G: 1 INJECTION, POWDER, FOR SOLUTION INTRAMUSCULAR; INTRAVENOUS at 07:34

## 2021-03-31 RX ADMIN — VECURONIUM BROMIDE 4 MG: 10 INJECTION, POWDER, LYOPHILIZED, FOR SOLUTION INTRAVENOUS at 10:00

## 2021-03-31 RX ADMIN — AMINOCAPROIC ACID 10 G: 250 INJECTION, SOLUTION INTRAVENOUS at 07:48

## 2021-03-31 RX ADMIN — ALBUMIN HUMAN 12.5 G: 0.05 INJECTION, SOLUTION INTRAVENOUS at 13:39

## 2021-03-31 RX ADMIN — MORPHINE SULFATE 2 MG: 4 INJECTION INTRAVENOUS at 15:19

## 2021-03-31 RX ADMIN — FENTANYL CITRATE 250 MCG: 0.05 INJECTION, SOLUTION INTRAMUSCULAR; INTRAVENOUS at 08:41

## 2021-03-31 RX ADMIN — Medication 0.04 MCG/KG/MIN: at 10:12

## 2021-03-31 RX ADMIN — MAGNESIUM SULFATE HEPTAHYDRATE 1 G: 1 INJECTION, SOLUTION INTRAVENOUS at 17:01

## 2021-03-31 RX ADMIN — SODIUM CHLORIDE 4 UNITS/HR: 900 INJECTION INTRAVENOUS at 09:29

## 2021-03-31 RX ADMIN — EPINEPHRINE 0.02 MCG/KG/MIN: 1 INJECTION INTRAMUSCULAR; INTRAVENOUS; SUBCUTANEOUS at 17:52

## 2021-03-31 RX ADMIN — CEFAZOLIN SODIUM 2 G: 10 INJECTION, POWDER, FOR SOLUTION INTRAVENOUS at 15:01

## 2021-03-31 RX ADMIN — MORPHINE SULFATE 2 MG: 4 INJECTION INTRAVENOUS at 19:49

## 2021-03-31 RX ADMIN — CEFAZOLIN SODIUM 2 G: 10 INJECTION, POWDER, FOR SOLUTION INTRAVENOUS at 22:33

## 2021-03-31 RX ADMIN — MIDAZOLAM 4 MG: 1 INJECTION INTRAMUSCULAR; INTRAVENOUS at 06:57

## 2021-03-31 RX ADMIN — ALBUMIN HUMAN 500 ML: 0.05 INJECTION, SOLUTION INTRAVENOUS at 18:20

## 2021-03-31 RX ADMIN — MORPHINE SULFATE 2 MG: 4 INJECTION INTRAVENOUS at 21:54

## 2021-03-31 RX ADMIN — AMINOCAPROIC ACID 10 G: 250 INJECTION, SOLUTION INTRAVENOUS at 10:26

## 2021-03-31 RX ADMIN — SODIUM CHLORIDE: 0.9 INJECTION, SOLUTION INTRAVENOUS at 06:54

## 2021-03-31 RX ADMIN — HYDROCODONE BITARTRATE AND ACETAMINOPHEN 1 TABLET: 5; 325 TABLET ORAL at 23:49

## 2021-03-31 RX ADMIN — PROTAMINE SULFATE 350 MG: 10 INJECTION, SOLUTION INTRAVENOUS at 10:18

## 2021-03-31 RX ADMIN — VECURONIUM BROMIDE 6 MG: 10 INJECTION, POWDER, LYOPHILIZED, FOR SOLUTION INTRAVENOUS at 08:51

## 2021-03-31 RX ADMIN — POTASSIUM CHLORIDE 10 MEQ: 7.45 INJECTION INTRAVENOUS at 18:37

## 2021-03-31 RX ADMIN — NITROGLYCERIN 10 MCG/MIN: 10 INJECTION INTRAVENOUS at 10:29

## 2021-03-31 RX ADMIN — FENTANYL CITRATE 100 MCG: 0.05 INJECTION, SOLUTION INTRAMUSCULAR; INTRAVENOUS at 06:57

## 2021-03-31 RX ADMIN — POTASSIUM CHLORIDE 10 MEQ: 7.46 INJECTION, SOLUTION INTRAVENOUS at 18:37

## 2021-03-31 RX ADMIN — FENTANYL CITRATE 150 MCG: 0.05 INJECTION, SOLUTION INTRAMUSCULAR; INTRAVENOUS at 07:33

## 2021-03-31 RX ADMIN — FENTANYL CITRATE 250 MCG: 0.05 INJECTION, SOLUTION INTRAMUSCULAR; INTRAVENOUS at 08:12

## 2021-03-31 RX ADMIN — FENTANYL CITRATE 250 MCG: 0.05 INJECTION, SOLUTION INTRAMUSCULAR; INTRAVENOUS at 08:18

## 2021-03-31 RX ADMIN — ALBUMIN HUMAN 12.5 G: 0.05 INJECTION, SOLUTION INTRAVENOUS at 14:47

## 2021-03-31 RX ADMIN — SODIUM CHLORIDE 30 ML/HR: 9 INJECTION, SOLUTION INTRAVENOUS at 12:13

## 2021-03-31 RX ADMIN — MIDAZOLAM 1 MG: 1 INJECTION INTRAMUSCULAR; INTRAVENOUS at 10:00

## 2021-03-31 RX ADMIN — NITROGLYCERIN 50 MCG: 5 INJECTION, SOLUTION INTRAVENOUS at 08:19

## 2021-03-31 RX ADMIN — ACETAMINOPHEN 500 MG: 500 TABLET ORAL at 17:01

## 2021-03-31 RX ADMIN — CHLORHEXIDINE GLUCONATE 15 ML: 1.2 SOLUTION ORAL at 21:07

## 2021-03-31 RX ADMIN — ASPIRIN 325 MG ORAL TABLET 325 MG: 325 PILL ORAL at 17:01

## 2021-03-31 RX ADMIN — CHLORHEXIDINE GLUCONATE 1 APPLICATION: 500 CLOTH TOPICAL at 06:35

## 2021-03-31 RX ADMIN — ALBUMIN HUMAN 12.5 G: 0.05 INJECTION, SOLUTION INTRAVENOUS at 11:45

## 2021-03-31 RX ADMIN — FENTANYL CITRATE 250 MCG: 0.05 INJECTION, SOLUTION INTRAMUSCULAR; INTRAVENOUS at 10:27

## 2021-03-31 RX ADMIN — FENTANYL CITRATE 250 MCG: 0.05 INJECTION, SOLUTION INTRAMUSCULAR; INTRAVENOUS at 10:09

## 2021-03-31 RX ADMIN — MAGNESIUM SULFATE HEPTAHYDRATE 2 G: 500 INJECTION, SOLUTION INTRAMUSCULAR; INTRAVENOUS at 10:05

## 2021-03-31 RX ADMIN — MORPHINE SULFATE 2 MG: 4 INJECTION INTRAVENOUS at 23:49

## 2021-03-31 RX ADMIN — ACETAMINOPHEN 500 MG: 500 TABLET ORAL at 23:49

## 2021-03-31 RX ADMIN — Medication 100 MCG: at 08:55

## 2021-03-31 RX ADMIN — CEFAZOLIN 2 G: 1 INJECTION, POWDER, FOR SOLUTION INTRAMUSCULAR; INTRAVENOUS at 10:26

## 2021-03-31 RX ADMIN — METOPROLOL TARTRATE 12.5 MG: 25 TABLET, FILM COATED ORAL at 06:35

## 2021-03-31 RX ADMIN — ALBUMIN HUMAN 12.5 G: 0.05 INJECTION, SOLUTION INTRAVENOUS at 12:45

## 2021-03-31 RX ADMIN — HEPARIN SODIUM 25000 UNITS: 1000 INJECTION, SOLUTION INTRAVENOUS; SUBCUTANEOUS at 08:35

## 2021-03-31 RX ADMIN — PANTOPRAZOLE SODIUM 40 MG: 40 INJECTION, POWDER, FOR SOLUTION INTRAVENOUS at 17:01

## 2021-03-31 RX ADMIN — DEXMEDETOMIDINE HYDROCHLORIDE 0.5 MCG/KG/HR: 100 INJECTION, SOLUTION INTRAVENOUS at 08:05

## 2021-03-31 NOTE — ANESTHESIA PROCEDURE NOTES
Central Line      Patient reassessed immediately prior to procedure    Patient location during procedure: OR  Indications: vascular access, MD/Surgeon request and central pressure monitoring  Staff  Anesthesiologist: Fuad Chowdhury MD  Preanesthetic Checklist  Completed: patient identified, IV checked, site marked, risks and benefits discussed, surgical consent, monitors and equipment checked, pre-op evaluation and timeout performed  Central Line Prep  Sterile Tech:gloves, cap, gown, mask and sterile barriers  Prep: chloraprep  Patient monitoring: blood pressure monitoring, continuous pulse oximetry and EKG  Central Line Procedure  Laterality:right  Location:internal jugular  Catheter Type:double lumen  Catheter Size:9 Fr  Guidance:ultrasound guided and palpation technique  PROCEDURE NOTE/ULTRASOUND INTERPRETATION.  Using ultrasound guidance the potential vascular sites for insertion of the catheter were visualized to determine the patency of the vessel to be used for vascular access.  After selecting the appropriate site for insertion, the needle was visualized under ultrasound being inserted into the internal jugular vein, followed by ultrasound confirmation of wire and catheter placement. There were no abnormalities seen on ultrasound; an image was taken; and the patient tolerated the procedure with no complications. Images: still images not obtained  Assessment  Post procedure:biopatch applied, line sutured and occlusive dressing applied  Assessement:blood return through all ports, free fluid flow, chest x-ray ordered and Troy Test  Complications:no  Patient Tolerance:patient tolerated the procedure well with no apparent complications

## 2021-03-31 NOTE — ANESTHESIA PREPROCEDURE EVALUATION
Anesthesia Evaluation     Patient summary reviewed and Nursing notes reviewed   no history of anesthetic complications:  NPO Solid Status: > 8 hours  NPO Liquid Status: > 8 hours           Airway   Mallampati: II  TM distance: >3 FB  Neck ROM: full  No difficulty expected  Dental      Pulmonary - normal exam   (+) shortness of breath,   Cardiovascular - normal exam    (+) CAD, angina,   (-) carotid artery disease      Neuro/Psych  (+) headaches, psychiatric history Anxiety and Depression,     GI/Hepatic/Renal/Endo - negative ROS     Musculoskeletal     Abdominal  - normal exam   Substance History - negative use     OB/GYN negative ob/gyn ROS         Other   arthritis,                      Anesthesia Plan    ASA 4     general   (Discussed a line, central line, ANGELICA, and post op intubation.)  intravenous induction   Postoperative Plan: Expected vent after surgery  Anesthetic plan, all risks, benefits, and alternatives have been provided, discussed and informed consent has been obtained with: patient.

## 2021-03-31 NOTE — ANESTHESIA PROCEDURE NOTES
Central Line      Patient reassessed immediately prior to procedure    Patient location during procedure: OR  Indications: vascular access, MD/Surgeon request and central pressure monitoring  Preanesthetic Checklist  Completed: patient identified, IV checked, site marked, risks and benefits discussed, surgical consent, monitors and equipment checked, pre-op evaluation and timeout performed  Central Line Prep  Sterile Tech:gloves, cap, gown, mask and sterile barriers  Prep: chloraprep  Patient monitoring: blood pressure monitoring, continuous pulse oximetry and EKG  Central Line Procedure  Laterality:right  Location:internal jugular  Catheter Type:[place]-Gabriella  Guidance:ultrasound guided  PROCEDURE NOTE/ULTRASOUND INTERPRETATION.  Using ultrasound guidance the potential vascular sites for insertion of the catheter were visualized to determine the patency of the vessel to be used for vascular access.  After selecting the appropriate site for insertion, the needle was visualized under ultrasound being inserted into the internal jugular vein, followed by ultrasound confirmation of wire and catheter placement. There were no abnormalities seen on ultrasound; an image was taken; and the patient tolerated the procedure with no complications. Images: still images not obtained  Assessment  Post procedure:biopatch applied, line sutured and occlusive dressing applied  Assessement:blood return through all ports, free fluid flow and chest x-ray ordered  Complications:no  Patient Tolerance:patient tolerated the procedure well with no apparent complications  Additional Notes  [place] placed through central line

## 2021-03-31 NOTE — ANESTHESIA PROCEDURE NOTES
Preanesthesia Checklist:  Patient identified, IV assessed, risks and benefits discussed, monitors and equipment assessed, procedure being performed at surgeon's request and anesthesia consent obtained.    General Procedure Information  ANGELICA Placed for monitoring purposes only -- This is not a diagnostic ANGELICA  Diagnostic Indications for Echo:  assessment of surgical repair and hemodynamic monitoring  Physician Requesting Echo: Axel Smyth MD  Location performed:  OR  Intubated  Bite block placed  Heart visualized  Probe Insertion:  Easy  Probe Type:  Multiplane  Modalities:  Color flow mapping    Echocardiographic and Doppler Measurements    Ventricles    Right Ventricle:  Cavity size normal.  Hypertrophy not present.  Thrombus not present.    Left Ventricle:  Cavity size normal.  Hypertrophy not present.  Thrombus not present.  Global Function normal.  Ejection Fraction 60%.          Valves    Aortic Valve:  Annulus normal.  Stenosis not present.  Regurgitation absent.  Leaflets normal.  Leaflet motions normal.      Mitral Valve:  Annulus normal.  Stenosis not present.  Regurgitation trace.  Leaflets normal.  Leaflet motions normal.      Tricuspid Valve:  Annulus normal.              Atria    Right Atrium:  Size normal.    Left Atrium:  Size normal.      Septa    Atrial Septum:  Intra-atrial septal morphology normal.              Other Findings  Pericardium:  normal  Pleural Effusion:  none      Anesthesia Information  Performed Personally  Anesthesiologist:  Fuad Chowdhury MD      Echocardiogram Comments:       Post bypass EF 60%. Trace MR. No AI or AS.

## 2021-03-31 NOTE — ANESTHESIA POSTPROCEDURE EVALUATION
Patient: Jean-Claude Clifford Jr.    Procedure Summary     Date: 03/31/21 Room / Location: [facility] CVOR 01 / [facility]    Anesthesia Start: 0654 Anesthesia Stop: 1132    Procedure: CORONARY ARTERY BYPASS GRAFTING (N/A Chest) Diagnosis:       Coronary artery disease involving native coronary artery of native heart with unstable angina pectoris (CMS/HCC)      (Coronary artery disease involving native coronary artery of native heart with unstable angina pectoris (CMS/HCC) [I25.110])    Surgeons: Axel Smyth MD Provider: Fuad Chowdhury MD    Anesthesia Type: general ASA Status: 4          Anesthesia Type: general    Vitals  Vitals Value Taken Time   /68 03/31/21 1211   Temp     Pulse 90 03/31/21 1303   Resp     SpO2 97 % 03/31/21 1303   Vitals shown include unvalidated device data.        Post Anesthesia Care and Evaluation    Patient location during evaluation: ICU  Patient participation: complete - patient cannot participate  Level of consciousness: obtunded/minimal responses  Pain scale: See nurse's notes for pain score.  Pain management: adequate  Airway patency: patent  Anesthetic complications: No anesthetic complications  PONV Status: none  Cardiovascular status: acceptable  Respiratory status: acceptable  Hydration status: acceptable    Comments: Patient seen and examined postoperatively; vital signs stable; SpO2 greater than or equal to 90%; cardiopulmonary status stable; nausea/vomiting adequately controlled; pain adequately controlled; no apparent anesthesia complications; patient discharged from anesthesia care when discharge criteria were met

## 2021-03-31 NOTE — ANESTHESIA PROCEDURE NOTES
Airway  Urgency: elective    Date/Time: 3/31/2021 7:36 AM  Airway not difficult    General Information and Staff    Patient location during procedure: OR  Anesthesiologist: Fuad Chowdhury MD    Indications and Patient Condition  Indications for airway management: airway protection    Preoxygenated: yes  MILS maintained throughout  Mask difficulty assessment: 1 - vent by mask    Final Airway Details  Final airway type: endotracheal airway      Successful airway: ETT  Cuffed: yes   Successful intubation technique: direct laryngoscopy  Facilitating devices/methods: cricoid pressure  Endotracheal tube insertion site: oral  Blade: Ese  Blade size: 4  ETT size (mm): 7.5 (glottic suction tube)  Cormack-Lehane Classification: grade I - full view of glottis  Placement verified by: chest auscultation and capnometry   Measured from: lips  ETT/EBT  to lips (cm): 21  Number of attempts at approach: 1  Assessment: lips, teeth, and gum same as pre-op and atraumatic intubation

## 2021-03-31 NOTE — ANESTHESIA PROCEDURE NOTES
Arterial Line      Patient reassessed immediately prior to procedure    Patient location during procedure: OR   Line placed for hemodynamic monitoring, ABGs/Labs/ISTAT and MD/Surgeon request.  Performed By   Anesthesiologist: Fuad Chowdhury MD  Preanesthetic Checklist  Completed: patient identified, IV checked, site marked, risks and benefits discussed, surgical consent, monitors and equipment checked, pre-op evaluation and timeout performed  Arterial Line Prep   Sterile Tech: cap, gloves, sterile barriers and mask  Prep: ChloraPrep  Patient monitoring: EKG, continuous pulse oximetry and blood pressure monitoring  Arterial Line Procedure   Laterality:left  Location:  radial artery  Catheter size: 20 G   Guidance: palpation technique  Number of attempts: 1  Successful placement: yes  Heparin (Porcine) in NaCl 1000-0.9 UT/500ML-% for arterial line pressure bag, 1,000 Units  Post Assessment   Dressing Type: wrist guard applied, secured with tape and occlusive dressing applied.   Complications no  Circ/Move/Sens Assessment: normal and unchanged.   Patient Tolerance: patient tolerated the procedure well with no apparent complications

## 2021-04-01 ENCOUNTER — APPOINTMENT (OUTPATIENT)
Dept: GENERAL RADIOLOGY | Facility: HOSPITAL | Age: 56
End: 2021-04-01

## 2021-04-01 LAB
ALBUMIN SERPL-MCNC: 4.5 G/DL (ref 3.5–5.2)
ANION GAP SERPL CALCULATED.3IONS-SCNC: 11 MMOL/L (ref 5–15)
ANION GAP SERPL CALCULATED.3IONS-SCNC: 12 MMOL/L (ref 5–15)
ANION GAP SERPL CALCULATED.3IONS-SCNC: 12 MMOL/L (ref 5–15)
ARTERIAL PATENCY WRIST A: ABNORMAL
ATMOSPHERIC PRESS: ABNORMAL MM[HG]
BASE EXCESS BLDA CALC-SCNC: -2.7 MMOL/L (ref 0–3)
BASOPHILS # BLD AUTO: 0 10*3/MM3 (ref 0–0.2)
BASOPHILS NFR BLD AUTO: 0.1 % (ref 0–1.5)
BDY SITE: ABNORMAL
BUN SERPL-MCNC: 25 MG/DL (ref 6–20)
BUN SERPL-MCNC: 26 MG/DL (ref 6–20)
BUN SERPL-MCNC: 28 MG/DL (ref 6–20)
BUN/CREAT SERPL: 21.4 (ref 7–25)
BUN/CREAT SERPL: 22.6 (ref 7–25)
BUN/CREAT SERPL: 23.7 (ref 7–25)
CA-I SERPL ISE-MCNC: 1.2 MMOL/L (ref 1.2–1.3)
CA-I SERPL ISE-MCNC: 1.23 MMOL/L (ref 1.2–1.3)
CALCIUM SPEC-SCNC: 8.6 MG/DL (ref 8.6–10.5)
CALCIUM SPEC-SCNC: 8.9 MG/DL (ref 8.6–10.5)
CALCIUM SPEC-SCNC: 9.2 MG/DL (ref 8.6–10.5)
CHLORIDE SERPL-SCNC: 101 MMOL/L (ref 98–107)
CHLORIDE SERPL-SCNC: 103 MMOL/L (ref 98–107)
CHLORIDE SERPL-SCNC: 107 MMOL/L (ref 98–107)
CO2 BLDA-SCNC: 24.5 MMOL/L (ref 22–29)
CO2 SERPL-SCNC: 22 MMOL/L (ref 22–29)
CO2 SERPL-SCNC: 23 MMOL/L (ref 22–29)
CO2 SERPL-SCNC: 23 MMOL/L (ref 22–29)
CREAT SERPL-MCNC: 1.15 MG/DL (ref 0.76–1.27)
CREAT SERPL-MCNC: 1.17 MG/DL (ref 0.76–1.27)
CREAT SERPL-MCNC: 1.18 MG/DL (ref 0.76–1.27)
DEPRECATED RDW RBC AUTO: 44.2 FL (ref 37–54)
EOSINOPHIL # BLD AUTO: 0 10*3/MM3 (ref 0–0.4)
EOSINOPHIL NFR BLD AUTO: 0 % (ref 0.3–6.2)
ERYTHROCYTE [DISTWIDTH] IN BLOOD BY AUTOMATED COUNT: 13.2 % (ref 12.3–15.4)
GFR SERPL CREATININE-BSD FRML MDRD: 64 ML/MIN/1.73
GFR SERPL CREATININE-BSD FRML MDRD: 64 ML/MIN/1.73
GFR SERPL CREATININE-BSD FRML MDRD: 66 ML/MIN/1.73
GLUCOSE BLDC GLUCOMTR-MCNC: 103 MG/DL (ref 70–105)
GLUCOSE BLDC GLUCOMTR-MCNC: 108 MG/DL (ref 70–105)
GLUCOSE BLDC GLUCOMTR-MCNC: 109 MG/DL (ref 70–105)
GLUCOSE BLDC GLUCOMTR-MCNC: 114 MG/DL (ref 70–105)
GLUCOSE BLDC GLUCOMTR-MCNC: 116 MG/DL (ref 70–105)
GLUCOSE BLDC GLUCOMTR-MCNC: 120 MG/DL (ref 70–105)
GLUCOSE BLDC GLUCOMTR-MCNC: 123 MG/DL (ref 70–105)
GLUCOSE BLDC GLUCOMTR-MCNC: 125 MG/DL (ref 70–105)
GLUCOSE BLDC GLUCOMTR-MCNC: 129 MG/DL (ref 70–105)
GLUCOSE BLDC GLUCOMTR-MCNC: 136 MG/DL (ref 70–105)
GLUCOSE BLDC GLUCOMTR-MCNC: 146 MG/DL (ref 70–105)
GLUCOSE BLDC GLUCOMTR-MCNC: 150 MG/DL (ref 70–105)
GLUCOSE SERPL-MCNC: 140 MG/DL (ref 65–99)
GLUCOSE SERPL-MCNC: 149 MG/DL (ref 65–99)
GLUCOSE SERPL-MCNC: 182 MG/DL (ref 65–99)
HCO3 BLDA-SCNC: 23.2 MMOL/L (ref 21–28)
HCT VFR BLD AUTO: 28.1 % (ref 37.5–51)
HEMODILUTION: NO
HGB BLD-MCNC: 9.7 G/DL (ref 13–17.7)
INHALED O2 CONCENTRATION: <21 %
INR PPP: 1.04 (ref 0.93–1.1)
LYMPHOCYTES # BLD AUTO: 0.8 10*3/MM3 (ref 0.7–3.1)
LYMPHOCYTES NFR BLD AUTO: 8.8 % (ref 19.6–45.3)
MAGNESIUM SERPL-MCNC: 3.1 MG/DL (ref 1.6–2.6)
MCH RBC QN AUTO: 32.9 PG (ref 26.6–33)
MCHC RBC AUTO-ENTMCNC: 34.6 G/DL (ref 31.5–35.7)
MCV RBC AUTO: 95.2 FL (ref 79–97)
MODALITY: ABNORMAL
MONOCYTES # BLD AUTO: 1.2 10*3/MM3 (ref 0.1–0.9)
MONOCYTES NFR BLD AUTO: 12.3 % (ref 5–12)
NEUTROPHILS NFR BLD AUTO: 7.4 10*3/MM3 (ref 1.7–7)
NEUTROPHILS NFR BLD AUTO: 78.8 % (ref 42.7–76)
NRBC BLD AUTO-RTO: 0 /100 WBC (ref 0–0.2)
PCO2 BLDA: 44 MM HG (ref 35–48)
PH BLDA: 7.33 PH UNITS (ref 7.35–7.45)
PHOSPHATE SERPL-MCNC: 5 MG/DL (ref 2.5–4.5)
PLATELET # BLD AUTO: 115 10*3/MM3 (ref 140–450)
PMV BLD AUTO: 6.7 FL (ref 6–12)
PO2 BLDA: 61.9 MM HG (ref 83–108)
POTASSIUM SERPL-SCNC: 3.7 MMOL/L (ref 3.5–5.2)
POTASSIUM SERPL-SCNC: 4.3 MMOL/L (ref 3.5–5.2)
POTASSIUM SERPL-SCNC: 4.4 MMOL/L (ref 3.5–5.2)
PROTHROMBIN TIME: 11.4 SECONDS (ref 9.6–11.7)
RBC # BLD AUTO: 2.95 10*6/MM3 (ref 4.14–5.8)
SAO2 % BLDCOA: 89.5 % (ref 94–98)
SODIUM SERPL-SCNC: 135 MMOL/L (ref 136–145)
SODIUM SERPL-SCNC: 137 MMOL/L (ref 136–145)
SODIUM SERPL-SCNC: 142 MMOL/L (ref 136–145)
WBC # BLD AUTO: 9.4 10*3/MM3 (ref 3.4–10.8)

## 2021-04-01 PROCEDURE — 63710000001 INSULIN REGULAR HUMAN PER 5 UNITS: Performed by: NURSE PRACTITIONER

## 2021-04-01 PROCEDURE — 82330 ASSAY OF CALCIUM: CPT | Performed by: NURSE PRACTITIONER

## 2021-04-01 PROCEDURE — 25010000002 FUROSEMIDE PER 20 MG: Performed by: NURSE PRACTITIONER

## 2021-04-01 PROCEDURE — 97530 THERAPEUTIC ACTIVITIES: CPT

## 2021-04-01 PROCEDURE — 82803 BLOOD GASES ANY COMBINATION: CPT

## 2021-04-01 PROCEDURE — 82962 GLUCOSE BLOOD TEST: CPT

## 2021-04-01 PROCEDURE — 97535 SELF CARE MNGMENT TRAINING: CPT

## 2021-04-01 PROCEDURE — 85610 PROTHROMBIN TIME: CPT | Performed by: NURSE PRACTITIONER

## 2021-04-01 PROCEDURE — 80069 RENAL FUNCTION PANEL: CPT | Performed by: NURSE PRACTITIONER

## 2021-04-01 PROCEDURE — 25010000002 CEFAZOLIN PER 500 MG: Performed by: NURSE PRACTITIONER

## 2021-04-01 PROCEDURE — 93005 ELECTROCARDIOGRAM TRACING: CPT | Performed by: NURSE PRACTITIONER

## 2021-04-01 PROCEDURE — 80048 BASIC METABOLIC PNL TOTAL CA: CPT | Performed by: NURSE PRACTITIONER

## 2021-04-01 PROCEDURE — 71045 X-RAY EXAM CHEST 1 VIEW: CPT

## 2021-04-01 PROCEDURE — 85025 COMPLETE CBC W/AUTO DIFF WBC: CPT | Performed by: NURSE PRACTITIONER

## 2021-04-01 PROCEDURE — 97161 PT EVAL LOW COMPLEX 20 MIN: CPT

## 2021-04-01 PROCEDURE — 25010000002 MAGNESIUM SULFATE IN D5W 1G/100ML (PREMIX) 1-5 GM/100ML-% SOLUTION: Performed by: NURSE PRACTITIONER

## 2021-04-01 PROCEDURE — 94799 UNLISTED PULMONARY SVC/PX: CPT

## 2021-04-01 PROCEDURE — 83735 ASSAY OF MAGNESIUM: CPT | Performed by: NURSE PRACTITIONER

## 2021-04-01 PROCEDURE — 97166 OT EVAL MOD COMPLEX 45 MIN: CPT

## 2021-04-01 PROCEDURE — 25010000002 MORPHINE PER 10 MG: Performed by: NURSE PRACTITIONER

## 2021-04-01 PROCEDURE — 99024 POSTOP FOLLOW-UP VISIT: CPT | Performed by: NURSE PRACTITIONER

## 2021-04-01 RX ORDER — POTASSIUM CHLORIDE 20 MEQ/1
20 TABLET, EXTENDED RELEASE ORAL ONCE
Status: COMPLETED | OUTPATIENT
Start: 2021-04-01 | End: 2021-04-01

## 2021-04-01 RX ORDER — FUROSEMIDE 10 MG/ML
20 INJECTION INTRAMUSCULAR; INTRAVENOUS ONCE
Status: COMPLETED | OUTPATIENT
Start: 2021-04-01 | End: 2021-04-01

## 2021-04-01 RX ADMIN — CHLORHEXIDINE GLUCONATE 15 ML: 1.2 SOLUTION ORAL at 08:05

## 2021-04-01 RX ADMIN — SENNOSIDES 1 TABLET: 8.6 TABLET, FILM COATED ORAL at 20:10

## 2021-04-01 RX ADMIN — ACETAMINOPHEN 500 MG: 500 TABLET ORAL at 05:50

## 2021-04-01 RX ADMIN — CHLORHEXIDINE GLUCONATE 15 ML: 1.2 SOLUTION ORAL at 20:10

## 2021-04-01 RX ADMIN — MUPIROCIN 1 APPLICATION: 20 OINTMENT TOPICAL at 08:05

## 2021-04-01 RX ADMIN — MORPHINE SULFATE 2 MG: 4 INJECTION INTRAVENOUS at 13:19

## 2021-04-01 RX ADMIN — DOCUSATE SODIUM 100 MG: 100 CAPSULE, LIQUID FILLED ORAL at 08:06

## 2021-04-01 RX ADMIN — MORPHINE SULFATE 2 MG: 4 INJECTION INTRAVENOUS at 07:57

## 2021-04-01 RX ADMIN — SODIUM CHLORIDE 2.4 UNITS/HR: 9 INJECTION, SOLUTION INTRAVENOUS at 18:12

## 2021-04-01 RX ADMIN — CEFAZOLIN SODIUM 2 G: 10 INJECTION, POWDER, FOR SOLUTION INTRAVENOUS at 06:30

## 2021-04-01 RX ADMIN — MORPHINE SULFATE 2 MG: 4 INJECTION INTRAVENOUS at 05:50

## 2021-04-01 RX ADMIN — SENNOSIDES 1 TABLET: 8.6 TABLET, FILM COATED ORAL at 08:06

## 2021-04-01 RX ADMIN — FUROSEMIDE 20 MG: 10 INJECTION, SOLUTION INTRAMUSCULAR; INTRAVENOUS at 13:19

## 2021-04-01 RX ADMIN — MAGNESIUM SULFATE HEPTAHYDRATE 1 G: 1 INJECTION, SOLUTION INTRAVENOUS at 02:05

## 2021-04-01 RX ADMIN — CEFAZOLIN SODIUM 2 G: 10 INJECTION, POWDER, FOR SOLUTION INTRAVENOUS at 22:30

## 2021-04-01 RX ADMIN — MORPHINE SULFATE 2 MG: 4 INJECTION INTRAVENOUS at 22:23

## 2021-04-01 RX ADMIN — PANTOPRAZOLE SODIUM 40 MG: 40 TABLET, DELAYED RELEASE ORAL at 05:50

## 2021-04-01 RX ADMIN — HYDROCODONE BITARTRATE AND ACETAMINOPHEN 1 TABLET: 5; 325 TABLET ORAL at 03:58

## 2021-04-01 RX ADMIN — POLYETHYLENE GLYCOL 3350 17 G: 17 POWDER, FOR SOLUTION ORAL at 08:05

## 2021-04-01 RX ADMIN — DOCUSATE SODIUM 100 MG: 100 CAPSULE, LIQUID FILLED ORAL at 20:10

## 2021-04-01 RX ADMIN — MORPHINE SULFATE 2 MG: 4 INJECTION INTRAVENOUS at 18:31

## 2021-04-01 RX ADMIN — MUPIROCIN 1 APPLICATION: 20 OINTMENT TOPICAL at 20:11

## 2021-04-01 RX ADMIN — MORPHINE SULFATE 2 MG: 4 INJECTION INTRAVENOUS at 02:05

## 2021-04-01 RX ADMIN — HYDROCODONE BITARTRATE AND ACETAMINOPHEN 1 TABLET: 5; 325 TABLET ORAL at 20:10

## 2021-04-01 RX ADMIN — POTASSIUM CHLORIDE 20 MEQ: 1500 TABLET, EXTENDED RELEASE ORAL at 13:19

## 2021-04-01 RX ADMIN — HYDROCODONE BITARTRATE AND ACETAMINOPHEN 1 TABLET: 5; 325 TABLET ORAL at 11:56

## 2021-04-01 RX ADMIN — MAGNESIUM SULFATE HEPTAHYDRATE 1 G: 1 INJECTION, SOLUTION INTRAVENOUS at 10:26

## 2021-04-01 RX ADMIN — MORPHINE SULFATE 2 MG: 4 INJECTION INTRAVENOUS at 10:24

## 2021-04-01 RX ADMIN — ASPIRIN 81 MG: 81 TABLET, COATED ORAL at 08:06

## 2021-04-01 RX ADMIN — CEFAZOLIN SODIUM 2 G: 10 INJECTION, POWDER, FOR SOLUTION INTRAVENOUS at 15:52

## 2021-04-01 RX ADMIN — HYDROCODONE BITARTRATE AND ACETAMINOPHEN 1 TABLET: 5; 325 TABLET ORAL at 07:57

## 2021-04-01 RX ADMIN — ATORVASTATIN CALCIUM 40 MG: 40 TABLET, FILM COATED ORAL at 20:10

## 2021-04-01 RX ADMIN — HYDROCODONE BITARTRATE AND ACETAMINOPHEN 1 TABLET: 5; 325 TABLET ORAL at 15:52

## 2021-04-01 NOTE — THERAPY EVALUATION
Patient Name: Jean-Claude Clifford Jr.  : 1965    MRN: [id number]                              Today's Date: 2021       Admit Date: 3/30/2021    Visit Dx:     ICD-10-CM ICD-9-CM   1. Coronary artery disease involving native coronary artery of native heart with unstable angina pectoris (CMS/HCC)  I25.110 414.01     411.1   2. Chest pain due to myocardial ischemia, unspecified ischemic chest pain type  I25.9 786.50     Patient Active Problem List   Diagnosis   • Degenerative joint disease of right acromioclavicular joint   • Nontraumatic tear of rotator cuff   • Other tear of medial meniscus, current injury, left knee, subsequent encounter   • Orthopedic aftercare   • Hemorrhoids   • Overweight with body mass index (BMI) of 28 to 28.9 in adult   • Short of breath on exertion   • Actinic keratosis   • Other chest pain   • Bradycardia, sinus   • Chest pain due to myocardial ischemia   • Coronary artery disease involving native coronary artery of native heart with unstable angina pectoris (CMS/HCC)     Past Medical History:   Diagnosis Date   • Anxiety    • Depression    • Migraines    • Mild cognitive impairment      Past Surgical History:   Procedure Laterality Date   • CARDIAC CATHETERIZATION N/A 3/30/2021    Procedure: Left Heart Cath;  Surgeon: Félix Taylor MD;  Location: [facility] CATH INVASIVE LOCATION;  Service: Cardiovascular;  Laterality: N/A;   • CHOLECYSTECTOMY     • FOOT SURGERY     • KNEE ARTHROSCOPY     • NOSE SURGERY       General Information     Row Name 21 1621          OT Time and Intention    Document Type  evaluation  -MARIA ISABEL     Mode of Treatment  occupational therapy  -MARIA ISABEL     Row Name 21 162          General Information    Patient Profile Reviewed  yes  -MARIA ISABEL     Prior Level of Function  independent:;ADL's;all household mobility;driving;work  -MARIA ISABEL     Existing Precautions/Restrictions  fall;sternal  -MARIA ISABEL     Row Name 21 162          Occupational Profile    Reason for  Services/Referral (Occupational Profile)  Pt is 56 y/o F s/p CABG x 4 on 3/31/21. PMH includes HTN. Pt resides with his spouse in a one story home with few KYLER. Pt reports indep with ADLs and functional mobility PTA.  -     Row Name 04/01/21 1621          Living Environment    Lives With  child(leroy), adult;spouse  -     Row Name 04/01/21 1621          Home Main Entrance    Number of Stairs, Main Entrance  one  -     Row Name 04/01/21 1621          Cognition    Orientation Status (Cognition)  oriented x 4  -     Row Name 04/01/21 1621          Safety Issues, Functional Mobility    Impairments Affecting Function (Mobility)  pain;shortness of breath;endurance/activity tolerance  -     Comment, Safety Issues/Impairments (Mobility)  gait belt and non skid socks used  -       User Key  (r) = Recorded By, (t) = Taken By, (c) = Cosigned By    Initials Name Provider Type     Jory Coates OT Occupational Therapist          Mobility/ADL's     Row Name 04/01/21 1624          Bed Mobility    Bed Mobility  supine-sit  -     Supine-Sit [place] (Bed Mobility)  maximum assist (25% patient effort);1 person assist  -     Bed Mobility, Safety Issues  decreased use of arms for pushing/pulling;impaired trunk control for bed mobility  -     Row Name 04/01/21 1624          Transfers    Transfers  bed-chair transfer  -     Bed-Chair [place] (Transfers)  set up;1 person assist;minimum assist (75% patient effort)  -     Row Name 04/01/21 1624          Functional Mobility    Functional Mobility- Ind. Level  contact guard assist;1 person  -     Functional Mobility-Distance (Feet)  3  -     Functional Mobility- Safety Issues  supplemental O2  -     Row Name 04/01/21 1624          Activities of Daily Living    BADL Assessment/Intervention  lower body dressing;feeding  -     Row Name 04/01/21 1624          Lower Body Dressing Assessment/Training    [place] Level (Lower Body Dressing)   don;socks;maximum assist (25% patient effort)  -MARIA ISABEL     Position (Lower Body Dressing)  supine  -     Row Name 04/01/21 1624          Self-Feeding Assessment/Training    [place] Level (Feeding)  set up;liquids to mouth;scoop food and bring to mouth;modified independence  -MARIA ISABEL     Position (Self-Feeding)  supported sitting  -MARIA ISABEL     Comment (Feeding)  Pt encouraged to complete self feeding indep, with assist for setup and fatigue.  -MARIA ISABEL       User Key  (r) = Recorded By, (t) = Taken By, (c) = Cosigned By    Initials Name Provider Type    Jory Mohamud OT Occupational Therapist        Obj/Interventions     Row Name 04/01/21 1626          Sensory Assessment (Somatosensory)    Sensory Assessment (Somatosensory)  sensation intact  -     Row Name 04/01/21 1626          Vision Assessment/Intervention    Visual Impairment/Limitations  WFL  -     Row Name 04/01/21 1626          Range of Motion Comprehensive    General Range of Motion  no range of motion deficits identified  -[facility] Name 04/01/21 1626          Strength Comprehensive (MMT)    General Manual Muscle Testing (MMT) Assessment  no strength deficits identified  -     Row Name 04/01/21 1626          Balance    Balance Assessment  sitting static balance;standing static balance;standing dynamic balance  -MARIA ISABEL     Static Sitting Balance  WFL;unsupported;sitting, edge of bed  -MARIA ISABEL     Static Standing Balance  WFL;supported;standing  -MARIA ISABEL     Dynamic Standing Balance  WFL;supported;standing  -MARIA ISABEL       User Key  (r) = Recorded By, (t) = Taken By, (c) = Cosigned By    Initials Name Provider Type    Jory Mohamud OT Occupational Therapist        Goals/Plan     Row Name 04/01/21 1631          Transfer Goal 1 (OT)    Activity/Assistive Device (Transfer Goal 1, OT)  transfers, all  -MARIA ISABEL     [place] Level/Cues Needed (Transfer Goal 1, OT)  set-up required;standby assist  -MARIA ISABEL     Time Frame (Transfer Goal 1, OT)  long term goal (LTG);2 weeks   -MARIA ISABEL     Row Name 04/01/21 1631          Bathing Goal 1 (OT)    Activity/Device (Bathing Goal 1, OT)  bathing skills, all  -MARIA ISABEL     [place] Level/Cues Needed (Bathing Goal 1, OT)  set-up required;standby assist  -MARIA ISABEL     Time Frame (Bathing Goal 1, OT)  long term goal (LTG);2 weeks  -MARIA ISABEL     Row Name 04/01/21 1631          Dressing Goal 1 (OT)    Activity/Device (Dressing Goal 1, OT)  dressing skills, all  -MARIA ISABEL     [place]/Cues Needed (Dressing Goal 1, OT)  set-up required;standby assist  -MARIA ISABEL     Time Frame (Dressing Goal 1, OT)  long term goal (LTG);2 weeks  -MARIA ISABEL     Row Name 04/01/21 1631          Therapy Assessment/Plan (OT)    Planned Therapy Interventions (OT)  activity tolerance training;functional balance retraining;occupation/activity based interventions;ROM/therapeutic exercise;adaptive equipment training;BADL retraining;cognitive/visual perception retraining;edema control/reduction;neuromuscular control/coordination retraining;patient/caregiver education/training;passive ROM/stretching;strengthening exercise;transfer/mobility retraining  -MARIA ISABEL       User Key  (r) = Recorded By, (t) = Taken By, (c) = Cosigned By    Initials Name Provider Type    MARIA ISABEL Jory Coates OT Occupational Therapist        Clinical Impression     Row Name 04/01/21 1626          Pain Scale: Numbers Pre/Post-Treatment    Pretreatment Pain Rating  7/10  -MARIA ISABEL     Posttreatment Pain Rating  7/10  -MARIA ISABEL     Pain Location - Orientation  incisional  -MARIA ISABEL     Row Name 04/01/21 1626          Plan of Care Review    Plan of Care Reviewed With  patient;spouse  -     Outcome Summary  Pt is 54 y/o F s/p CABG x 4 on 3/31/21. PMH includes HTN. Pt resides with his spouse in a one story home with few KYLER. Pt reports indep with ADLs and functional mobility PTA. Pt transferred supine to EOB with max A, requiring max VCs for breathing techniques and use of heart hugger. Pt requires min A for STS and transfer to bedside chair. Pt educated on sternal  precautions in regards to ADLs and adaptations as he continues to recover. Mild receptiveness noted. Pt educated on possible needs for IP rehab and what that typically looks like in regards to schedule, etc. Extra time required for line mgmt. with assist per nurse: TRENTON knowles, HF 10L 02 NC and DEMI Estrada. Swanz had been removed earlier in the day. Pt appears significantly below ADL baseline, limited by impaired activity tolerance, impaired balance, generalized weakness and acuity of illness. OT recommends continued treatment at 5x/week and d/c to IP rehab. PPE worn: mask, gloves and goggles.  -     Row Name 04/01/21 1626          Therapy Assessment/Plan (OT)    Rehab Potential (OT)  good, to achieve stated therapy goals  -     Criteria for Skilled Therapeutic Interventions Met (OT)  yes;skilled treatment is necessary  -     Therapy Frequency (OT)  5 times/wk  -     Row Name 04/01/21 1626          Therapy Plan Review/Discharge Plan (OT)    Anticipated Discharge Disposition (OT)  inpatient rehabilitation facility  -     Row Name 04/01/21 1626          Vital Signs    Pretreatment Heart Rate (beats/min)  100  -MARIA ISABEL     Intratreatment Heart Rate (beats/min)  110  -MARIA ISABEL     Posttreatment Heart Rate (beats/min)  95  -MARIA ISABEL     Row Name 04/01/21 1626          Positioning and Restraints    Pre-Treatment Position  in bed  -MARIA ISABEL     Post Treatment Position  chair  -MARIA ISABEL     In Chair  notified nsg;sitting;call light within reach;encouraged to call for assist;exit alarm on;with family/caregiver  -       User Key  (r) = Recorded By, (t) = Taken By, (c) = Cosigned By    Initials Name Provider Type    Jory Mohamud, OT Occupational Therapist        Outcome Measures     Row Name 04/01/21 1632          How much help from another is currently needed...    Putting on and taking off regular lower body clothing?  1  -MARIA ISABEL     Bathing (including washing, rinsing, and drying)  1  -MARIA ISABEL     Toileting (which includes using toilet bed  pan or urinal)  1  -MARIA ISABEL     Putting on and taking off regular upper body clothing  2  -MARIA ISABEL     Taking care of personal grooming (such as brushing teeth)  2  -MARIA ISABEL     Eating meals  3  -MARIA ISABEL     AM-PAC 6 Clicks Score (OT)  10  -MARIA ISABEL     Row Name 04/01/21 1632          Functional Assessment    Outcome Measure Options  AM-PAC 6 Clicks Daily Activity (OT)  -       User Key  (r) = Recorded By, (t) = Taken By, (c) = Cosigned By    Initials Name Provider Type    Jory Mohamud OT Occupational Therapist        Occupational Therapy Education                 Title: PT OT SLP Therapies (In Progress)     Topic: Occupational Therapy (Done)     Point: ADL training (Done)     Description:   Instruct learner(s) on proper safety adaptation and remediation techniques during self care or transfers.   Instruct in proper use of assistive devices.              Learning Progress Summary           Patient Acceptance, E,TB, VU by MARIA ISABEL at 4/1/2021 1632                   Point: Home exercise program (Done)     Description:   Instruct learner(s) on appropriate technique for monitoring, assisting and/or progressing therapeutic exercises/activities.              Learning Progress Summary           Patient Acceptance, E,TB, VU by MARIA ISABEL at 4/1/2021 1632                               User Key     Initials Effective Dates Name Provider Type Discipline     07/15/20 -  Jory Coates OT Occupational Therapist OT              OT Recommendation and Plan  Planned Therapy Interventions (OT): activity tolerance training, functional balance retraining, occupation/activity based interventions, ROM/therapeutic exercise, adaptive equipment training, BADL retraining, cognitive/visual perception retraining, edema control/reduction, neuromuscular control/coordination retraining, patient/caregiver education/training, passive ROM/stretching, strengthening exercise, transfer/mobility retraining  Therapy Frequency (OT): 5 times/wk  Plan of Care Review  Plan  of Care Reviewed With: patient, spouse  Outcome Summary: Pt is 56 y/o F s/p CABG x 4 on 3/31/21. PMH includes HTN. Pt resides with his spouse in a one story home with few KYLER. Pt reports indep with ADLs and functional mobility PTA. Pt transferred supine to EOB with max A, requiring max VCs for breathing techniques and use of heart hugger. Pt requires min A for STS and transfer to bedside chair. Pt educated on sternal precautions in regards to ADLs and adaptations as he continues to recover. Mild receptiveness noted. Pt educated on possible needs for IP rehab and what that typically looks like in regards to schedule, etc. Extra time required for line mgmt. with assist per nurse: TRENTON knowles, HF 10L 02 NC and DEMI Estrada. Swanz had been removed earlier in the day. Pt appears significantly below ADL baseline, limited by impaired activity tolerance, impaired balance, generalized weakness and acuity of illness. OT recommends continued treatment at 5x/week and d/c to IP rehab. PPE worn: mask, gloves and goggles.     Time Calculation:   Time Calculation- OT     Row Name 04/01/21 1633             Time Calculation- OT    OT Start Time  1450  -MARIA ISABEL      OT Stop Time  1515  -MARIA ISABEL      OT Time Calculation (min)  25 min  -MARIA ISABEL      Total Timed Code Minutes- OT  15 minute(s)  -MARIA ISABEL      OT Received On  04/01/21  -MARIA ISABEL      OT - Next Appointment  04/02/21  -MARIA ISABEL      OT Goal Re-Cert Due Date  04/15/21  -MARIA ISABEL        User Key  (r) = Recorded By, (t) = Taken By, (c) = Cosigned By    Initials Name Provider Type    Jory Mohamud OT Occupational Therapist        Therapy Charges for Today     Code Description Service Date Service Provider Modifiers Qty    81203586913 HC OT EVAL MOD COMPLEXITY 3 4/1/2021 Jory Coates OT GO 1    54001477595 HC OT SELF CARE/MGMT/TRAIN EA 15 MIN 4/1/2021 Jory Coates OT GO 1               Jory Coates OT  4/1/2021

## 2021-04-01 NOTE — PROGRESS NOTES
" LOS: 2 days   Patient Care Team:  David Vallejo Sr., MD as PCP - General    Chief Complaint: post op fu    Subjective:  Symptoms:  He reports chest pain.  No shortness of breath.    Diet:  No nausea.    Pain:  Pain is requiring pain medication and partially controlled.          Vital Signs  Temp:  [97.8 °F (36.6 °C)-100.4 °F (38 °C)] 97.8 °F (36.6 °C)  Heart Rate:  [81-97] 88  BP: ()/(48-68) 99/48  Arterial Line BP: ()/(47-76) 119/53  FiO2 (%):  [40 %-70 %] 40 %  Body mass index is 28.12 kg/m².    Intake/Output Summary (Last 24 hours) at 4/1/2021 0822  Last data filed at 4/1/2021 0630  Gross per 24 hour   Intake 4467 ml   Output 5105 ml   Net -638 ml     No intake/output data recorded.    Chest tube drainage last 8 hours:  210 (no air leak)        03/30/21  1616 03/30/21  2100 04/01/21  0600   Weight: 79.4 kg (175 lb) 80.7 kg (177 lb 14.6 oz) 83.9 kg (184 lb 15.5 oz)         Objective:  General Appearance:  In pain.    Vital signs: (most recent): Blood pressure 99/48, pulse 88, temperature 97.8 °F (36.6 °C), temperature source Oral, resp. rate 18, height 172.7 cm (68\"), weight 83.9 kg (184 lb 15.5 oz), SpO2 92 %.    Output: Producing urine.    Lungs:  Normal effort and normal respiratory rate.  There are decreased breath sounds (bilateral lower lobes).  (O2 at 6 liters, shallow breathing)  Heart: Normal rate.  Regular rhythm.  S1 normal and S2 normal.  No friction rub.   Extremities: There is no dependent edema.    Neurological: Patient is alert and oriented to person, place and time.    Skin:  Warm and dry.  (Sternal and leg ace wrap dry and intact)            Results Review:        WBC WBC   Date Value Ref Range Status   04/01/2021 9.40 3.40 - 10.80 10*3/mm3 Final   03/31/2021 14.70 (H) 3.40 - 10.80 10*3/mm3 Final   03/31/2021 13.10 (H) 3.40 - 10.80 10*3/mm3 Final   03/30/2021 6.90 3.40 - 10.80 10*3/mm3 Final      HGB Hemoglobin   Date Value Ref Range Status   04/01/2021 9.7 (L) 13.0 - 17.7 g/dL Final "     Comment:     Result checked    03/31/2021 9.6 (L) 12.0 - 17.0 g/dL Final   03/31/2021 10.0 (L) 12.0 - 17.0 g/dL Final   03/31/2021 10.4 (L) 12.0 - 17.0 g/dL Final   03/31/2021 11.0 (L) 12.0 - 17.0 g/dL Final   03/31/2021 11.0 (L) 12.0 - 17.0 g/dL Final   03/31/2021 10.9 (L) 12.0 - 17.0 g/dL Final   03/31/2021 11.0 (L) 12.0 - 17.0 g/dL Final   03/31/2021 12.0 12.0 - 17.0 g/dL Final   03/31/2021 12.2 12.0 - 17.0 g/dL Final   03/31/2021 12.7 (L) 13.0 - 17.7 g/dL Final     Comment:     Result checked   03/31/2021 12.1 12.0 - 17.0 g/dL Final   03/31/2021 10.2 (L) 12.0 - 17.0 g/dL Final   03/31/2021 10.9 (L) 12.0 - 17.0 g/dL Final   03/31/2021 11.2 (L) 12.0 - 17.0 g/dL Final   03/31/2021 11.2 (L) 12.0 - 17.0 g/dL Final   03/31/2021 13.3 12.0 - 17.0 g/dL Final   03/31/2021 14.7 13.0 - 17.7 g/dL Final   03/30/2021 14.6 13.0 - 17.7 g/dL Final      HCT Hematocrit   Date Value Ref Range Status   04/01/2021 28.1 (L) 37.5 - 51.0 % Final   03/31/2021 28 (L) 38 - 51 % Final   03/31/2021 29 (L) 38 - 51 % Final   03/31/2021 31 (L) 38 - 51 % Final   03/31/2021 32 (L) 38 - 51 % Final   03/31/2021 32 (L) 38 - 51 % Final   03/31/2021 32 (L) 38 - 51 % Final   03/31/2021 32 (L) 38 - 51 % Final   03/31/2021 35 (L) 38 - 51 % Final   03/31/2021 36 (L) 38 - 51 % Final   03/31/2021 36.8 (L) 37.5 - 51.0 % Final   03/31/2021 36 (L) 38 - 51 % Final   03/31/2021 30 (L) 38 - 51 % Final   03/31/2021 32 (L) 38 - 51 % Final   03/31/2021 33 (L) 38 - 51 % Final   03/31/2021 33 (L) 38 - 51 % Final   03/31/2021 39 38 - 51 % Final   03/31/2021 41.5 37.5 - 51.0 % Final   03/30/2021 41.7 37.5 - 51.0 % Final      Platelets Platelets   Date Value Ref Range Status   04/01/2021 115 (L) 140 - 450 10*3/mm3 Final   03/31/2021 122 (L) 140 - 450 10*3/mm3 Final   03/31/2021 217 140 - 450 10*3/mm3 Final   03/30/2021 213 140 - 450 10*3/mm3 Final        PT/INR:    Protime   Date Value Ref Range Status   04/01/2021 11.4 9.6 - 11.7 Seconds Final   03/31/2021 12.3 (H)  9.6 - 11.7 Seconds Final   03/30/2021 10.5 9.6 - 11.7 Seconds Final   /  INR   Date Value Ref Range Status   04/01/2021 1.04 0.93 - 1.10 Final   03/31/2021 1.12 (H) 0.93 - 1.10 Final   03/30/2021 0.95 0.93 - 1.10 Final       Sodium Sodium   Date Value Ref Range Status   04/01/2021 142 136 - 145 mmol/L Final   03/31/2021 140 136 - 145 mmol/L Final   03/31/2021 138 136 - 145 mmol/L Final      Potassium Potassium   Date Value Ref Range Status   04/01/2021 4.4 3.5 - 5.2 mmol/L Final   03/31/2021 4.3 3.5 - 5.2 mmol/L Final   03/31/2021 4.3 3.5 - 5.2 mmol/L Final      Chloride Chloride   Date Value Ref Range Status   04/01/2021 107 98 - 107 mmol/L Final   03/31/2021 107 98 - 107 mmol/L Final   03/31/2021 104 98 - 107 mmol/L Final      Bicarbonate CO2   Date Value Ref Range Status   04/01/2021 23.0 22.0 - 29.0 mmol/L Final   03/31/2021 22.0 22.0 - 29.0 mmol/L Final   03/31/2021 22.0 22.0 - 29.0 mmol/L Final      BUN BUN   Date Value Ref Range Status   04/01/2021 25 (H) 6 - 20 mg/dL Final   03/31/2021 15 6 - 20 mg/dL Final   03/31/2021 16 6 - 20 mg/dL Final      Creatinine Creatinine   Date Value Ref Range Status   04/01/2021 1.17 0.76 - 1.27 mg/dL Final   03/31/2021 1.00 0.76 - 1.27 mg/dL Final   03/31/2021 0.98 0.76 - 1.27 mg/dL Final      Calcium Calcium   Date Value Ref Range Status   04/01/2021 9.2 8.6 - 10.5 mg/dL Final   03/31/2021 9.3 8.6 - 10.5 mg/dL Final   03/31/2021 9.2 8.6 - 10.5 mg/dL Final      Magnesium Magnesium   Date Value Ref Range Status   04/01/2021 3.1 (H) 1.6 - 2.6 mg/dL Final      Troponin No results found for: TROPONIN   BNP No results found for: BNP         acetaminophen, 500 mg, Oral, Q6H   Or  acetaminophen, 650 mg, Rectal, Q6H  aspirin, 81 mg, Oral, Daily  atorvastatin, 40 mg, Oral, Nightly  ceFAZolin, 2 g, Intravenous, Q8H  chlorhexidine, 15 mL, Mouth/Throat, Q12H  docusate sodium, 100 mg, Oral, BID  [START ON 4/2/2021] enoxaparin, 40 mg, Subcutaneous, Daily  magnesium sulfate, 1 g,  Intravenous, Q8H  mupirocin, 1 application, Each Nare, BID  pantoprazole, 40 mg, Oral, Q AM  polyethylene glycol, 17 g, Oral, Daily  senna, 1 tablet, Oral, BID      dexmedetomidine, 0.2-1.5 mcg/kg/hr, Last Rate: Stopped (03/31/21 2115)  insulin, 0-50 Units/hr, Last Rate: 1 Units/hr (04/01/21 0735)  niCARdipine, 5-15 mg/hr  nitroglycerin, 5-50 mcg/min  norepinephrine, 0.02-0.06 mcg/kg/min, Last Rate: 0.02 mcg/kg/min (04/01/21 0739)  sodium chloride, 30 mL/hr, Last Rate: 30 mL/hr (03/31/21 1213)        PRN Meds:.HYDROcodone-acetaminophen  •  insulin  •  Morphine **AND** naloxone  •  Mouth Kote  •  niCARdipine  •  nitroglycerin  •  ondansetron  •  potassium chloride **OR** potassium chloride  •  potassium chloride **OR** potassium chloride    Patient Active Problem List   Diagnosis Code   • Degenerative joint disease of right acromioclavicular joint M19.011   • Nontraumatic tear of rotator cuff M75.100   • Other tear of medial meniscus, current injury, left knee, subsequent encounter S83.242D   • Orthopedic aftercare Z47.89   • Hemorrhoids K64.9   • Overweight with body mass index (BMI) of 28 to 28.9 in adult E66.3, Z68.28   • Short of breath on exertion R06.02   • Actinic keratosis L57.0   • Other chest pain R07.89   • Bradycardia, sinus R00.1   • Chest pain due to myocardial ischemia I25.9   • Coronary artery disease involving native coronary artery of native heart with unstable angina pectoris (CMS/HCC) I25.110       Assessment & Plan    -CAD, EF 65% (by cath) s/p CABG x4 with LIMA-----POD #1 ([facility])  -HTN----hypotensive post op, on pressor  -Preop sinus zenaida----maintaining NSR    , on low dose Bienvenido gtt, wean as tolerated.  Mild pulmonary congestion with atelectasis on CXR, CVP 15, no room for diuresis with hypotension, wean O2 as tolerated.  Leave swan and arterial line for now, will reeval in a few hours for removal as patient mobilizes and progresses.      DOLORES Thompson  04/01/21  08:22 EDT

## 2021-04-01 NOTE — PLAN OF CARE
Goal Outcome Evaluation:         Pt. Is now POD #1 after a CABG x4 with an TANI. Pt was able to dangle at side of bed with no issues and plans to be up in the chair before the end of shift. Pt has progressed from ice chips to being able to drink water with oral medications without any difficulty. Pacer was put on standby and patient has been NSR with a HR of 90 by himself with adequate blood pressures. Patient oxygen demands have increased to 6L/min NC and saturates in the low 90's still. Urine output has been between 30-40 mL's/hr for the entire shift. Pt has been given pain medicine about as often as he can to help with pain. Levophed drip was stopped. Will continue to monitor.

## 2021-04-01 NOTE — PLAN OF CARE
Goal Outcome Evaluation:  Plan of Care Reviewed With: patient, spouse     Outcome Summary: 54 y/o male s/p CABG x 4 on 3/31. PMH Includes HTN. At time of eval, pt with post op lines to include chest tube, knowles, O2 at 2l/nc, swan, arterial line. Pt reporting of pain chest tube site. Pt able to follow sternal precautions during sit <>stand transfer with verbal cues.Educated on activity restrictions and ex expectation upon dc to pt/spouse. Pt tolerated standing marches/partial squats without report of dizziness or inc pain. Should progress well and be able to dc home with family assist. PPE gloves, mask with  shield.

## 2021-04-01 NOTE — PLAN OF CARE
Goal Outcome Evaluation:  Plan of Care Reviewed With: patient, spouse     Outcome Summary: Pt is 54 y/o F s/p CABG x 4 on 3/31/21. PMH includes HTN. Pt resides with his spouse in a one story home with few KYLER. Pt reports indep with ADLs and functional mobility PTA. Pt transferred supine to EOB with max A, requiring max VCs for breathing techniques and use of heart hugger. Pt requires min A for STS and transfer to bedside chair. Pt educated on sternal precautions in regards to ADLs and adaptations as he continues to recover. Mild receptiveness noted. Pt educated on possible needs for IP rehab and what that typically looks like in regards to schedule, etc. Extra time required for line mgmt. with assist per nurse: TRENOTN knowles, HF 10L 02 NC and DEMI Estrada. Swanz had been removed earlier in the day. Pt appears significantly below ADL baseline, limited by impaired activity tolerance, impaired balance, generalized weakness and acuity of illness. OT recommends continued treatment at 5x/week and d/c to IP rehab. PPE worn: mask, gloves and goggles.

## 2021-04-01 NOTE — THERAPY EVALUATION
Patient Name: Jean-Claude Clifford Jr.  : 1965    MRN: [id number]                              Today's Date: 2021       Admit Date: 3/30/2021    Visit Dx:     ICD-10-CM ICD-9-CM   1. Coronary artery disease involving native coronary artery of native heart with unstable angina pectoris (CMS/HCC)  I25.110 414.01     411.1   2. Chest pain due to myocardial ischemia, unspecified ischemic chest pain type  I25.9 786.50     Patient Active Problem List   Diagnosis   • Degenerative joint disease of right acromioclavicular joint   • Nontraumatic tear of rotator cuff   • Other tear of medial meniscus, current injury, left knee, subsequent encounter   • Orthopedic aftercare   • Hemorrhoids   • Overweight with body mass index (BMI) of 28 to 28.9 in adult   • Short of breath on exertion   • Actinic keratosis   • Other chest pain   • Bradycardia, sinus   • Chest pain due to myocardial ischemia   • Coronary artery disease involving native coronary artery of native heart with unstable angina pectoris (CMS/HCC)     Past Medical History:   Diagnosis Date   • Anxiety    • Depression    • Migraines    • Mild cognitive impairment      Past Surgical History:   Procedure Laterality Date   • CARDIAC CATHETERIZATION N/A 3/30/2021    Procedure: Left Heart Cath;  Surgeon: Félix Taylor MD;  Location: [facility] CATH INVASIVE LOCATION;  Service: Cardiovascular;  Laterality: N/A;   • CHOLECYSTECTOMY     • FOOT SURGERY     • KNEE ARTHROSCOPY     • NOSE SURGERY       General Information     Row Name 21 0956          Physical Therapy Time and Intention    Document Type  evaluation  -CR     Mode of Treatment  physical therapy  -CR     Row Name 21 0956          General Information    Patient Profile Reviewed  yes  -CR     Prior Level of Function  independent:;all household mobility;community mobility;ADL's;driving;work  -CR     Existing Precautions/Restrictions  sternal  -CR     Row Name 21 0956          Living Environment     Lives With  child(leroy), adult;spouse  -CR     Row Name 04/01/21 0956          Home Main Entrance    Number of Stairs, Main Entrance  one  -CR     Row Name 04/01/21 0956          Stairs Within Home, Primary    Number of Stairs, Within Home, Primary  none  -CR     Row Name 04/01/21 0956          Cognition    Orientation Status (Cognition)  oriented x 4  -CR     Row Name 04/01/21 0956          Safety Issues, Functional Mobility    Impairments Affecting Function (Mobility)  pain;shortness of breath  -CR       User Key  (r) = Recorded By, (t) = Taken By, (c) = Cosigned By    Initials Name Provider Type    CR Reyes, Carmela, PT Physical Therapist        Mobility     Row Name 04/01/21 0957          Bed Mobility    Comment (Bed Mobility)  up in chair  -CR     Row Name 04/01/21 0957          Sit-Stand Transfer    Sit-Stand [place] (Transfers)  contact guard;verbal cues  -CR     Row Name 04/01/21 0957          Gait/Stairs (Locomotion)    Comment (Gait/Stairs)  cardiac level 2  -CR       User Key  (r) = Recorded By, (t) = Taken By, (c) = Cosigned By    Initials Name Provider Type    CR Reyes, Carmela, PT Physical Therapist        Obj/Interventions     Row Name 04/01/21 0958          Range of Motion Comprehensive    General Range of Motion  bilateral lower extremity ROM WFL  -CR     Row Name 04/01/21 0958          Strength Comprehensive (MMT)    Comment, General Manual Muscle Testing (MMT) Assessment  BLE grossly wfl  -CR     Row Name 04/01/21 0958          Motor Skills    Therapeutic Exercise  -- AP, LAQ, standing marches, partial squats  -CR     Row Name 04/01/21 0958          Balance    Balance Assessment  sitting static balance;standing static balance;standing dynamic balance  -CR     Static Sitting Balance  WNL;sitting in chair  -CR     Static Standing Balance  WFL  -CR     Dynamic Standing Balance  WFL;supported  -CR     Balance Interventions  weight shifting activity;standing;supported  -CR     Comment, Balance   using rw for support  -CR     Row Name 04/01/21 0958          Sensory Assessment (Somatosensory)    Sensory Assessment (Somatosensory)  sensation intact  -CR       User Key  (r) = Recorded By, (t) = Taken By, (c) = Cosigned By    Initials Name Provider Type    CR Reyes, Carmela, PT Physical Therapist        Goals/Plan     Row Name 04/01/21 1003          Bed Mobility Goal 1 (PT)    Activity/Assistive Device (Bed Mobility Goal 1, PT)  sit to supine/supine to sit  -CR     [place] Level/Cues Needed (Bed Mobility Goal 1, PT)  modified independence  -CR     Time Frame (Bed Mobility Goal 1, PT)  1 week  -CR     Row Name 04/01/21 1003          Transfer Goal 1 (PT)    Activity/Assistive Device (Transfer Goal 1, PT)  transfers, all  -CR     [place] Level/Cues Needed (Transfer Goal 1, PT)  modified independence  -CR     Time Frame (Transfer Goal 1, PT)  1 week  -CR     Row Name 04/01/21 1003          Gait Training Goal 1 (PT)    Activity/Assistive Device (Gait Training Goal 1, PT)  gait (walking locomotion);increase endurance/gait distance  -CR     [place] Level (Gait Training Goal 1, PT)  standby assist  -CR     Distance (Gait Training Goal 1, PT)  300  -CR     Time Frame (Gait Training Goal 1, PT)  1 week  -CR     Row Name 04/01/21 1003          Patient Education Goal (PT)    Activity (Patient Education Goal, PT)  HEP and sternal precautions  -CR     [place]/Cues/Accuracy (Memory Goal 2, PT)  demonstrates adequately  -CR     Time Frame (Patient Education Goal, PT)  1 week  -CR       User Key  (r) = Recorded By, (t) = Taken By, (c) = Cosigned By    Initials Name Provider Type    CR Reyes, Carmela, PT Physical Therapist        Clinical Impression     Row Name 04/01/21 0959          Pain    Additional Documentation  Pain Scale: Numbers Pre/Post-Treatment (Group)  -CR     Row Name 04/01/21 0959          Pain Scale: Numbers Pre/Post-Treatment    Pretreatment Pain Rating  5/10  -CR     Posttreatment Pain Rating   6/10  -CR     Pain Location - Orientation  incisional  -CR     Pain Location  chest  -CR     Row Name 04/01/21 0959          Plan of Care Review    Plan of Care Reviewed With  patient;spouse  -CR     Outcome Summary  54 y/o male s/p CABG x 4 on 3/31. PMH Includes HTN. At time of eval, pt with post op lines to include chest tube, knowles, O2 at 2l/nc, swan, arterial line. Pt reporting of pain chest tube site. Pt able to follow sternal precautions during sit <>stand transfer with verbal cues.Educated on activity restrictions and ex expectation upon dc to pt/spouse. Pt tolerated standing marches/partial squats without report of dizziness or inc pain. Should progress well and be able to dc home with family assist. PPE gloves, mask with  shield.  -CR     Row Name 04/01/21 0959          Therapy Assessment/Plan (PT)    Patient/Family Therapy Goals Statement (PT)  home  -CR     Rehab Potential (PT)  good, to achieve stated therapy goals  -CR     Criteria for Skilled Interventions Met (PT)  skilled treatment is necessary;yes  -CR     Predicted Duration of Therapy Intervention (PT)  dc  -CR     Row Name 04/01/21 0959          Vital Signs    Post Systolic BP Rehab  110  -CR     Post Treatment Diastolic BP  21  -CR     Posttreatment Heart Rate (beats/min)  82  -CR     Post SpO2 (%)  97  -CR     Row Name 04/01/21 0959          Positioning and Restraints    Pre-Treatment Position  sitting in chair/recliner  -CR     Post Treatment Position  chair  -CR     In Chair  notified nsg;sitting;call light within reach;with family/caregiver  -CR       User Key  (r) = Recorded By, (t) = Taken By, (c) = Cosigned By    Initials Name Provider Type    CR Reyes, Carmela, PT Physical Therapist        Outcome Measures     Row Name 04/01/21 1004          How much help from another person do you currently need...    Turning from your back to your side while in flat bed without using bedrails?  3  -CR     Moving from lying on back to sitting on the  side of a flat bed without bedrails?  3  -CR     Moving to and from a bed to a chair (including a wheelchair)?  3  -CR     Standing up from a chair using your arms (e.g., wheelchair, bedside chair)?  3  -CR     Climbing 3-5 steps with a railing?  3  -CR     To walk in hospital room?  3  -CR     AM-PAC 6 Clicks Score (PT)  18  -CR     Row Name 04/01/21 1004          Functional Assessment    Outcome Measure Options  AM-PAC 6 Clicks Basic Mobility (PT)  -CR       User Key  (r) = Recorded By, (t) = Taken By, (c) = Cosigned By    Initials Name Provider Type    CR Reyes, Carmela, PT Physical Therapist        Physical Therapy Education                 Title: PT OT SLP Therapies (In Progress)     Topic: Physical Therapy (In Progress)     Point: Mobility training (Done)     Learning Progress Summary           Patient Acceptance, E, VU by CR at 4/1/2021 1004   Significant Other Acceptance, E, VU by CR at 4/1/2021 1004                   Point: Home exercise program (Done)     Learning Progress Summary           Patient Acceptance, E, VU by CR at 4/1/2021 1004   Significant Other Acceptance, E, VU by CR at 4/1/2021 1004                   Point: Body mechanics (Not Started)     Learner Progress:  Not documented in this visit.          Point: Precautions (Done)     Learning Progress Summary           Patient Acceptance, E, VU by CR at 4/1/2021 1004   Significant Other Acceptance, E, VU by CR at 4/1/2021 1004                               User Key     Initials Effective Dates Name Provider Type Discipline    CR 03/01/19 -  Reyes, Carmela, PT Physical Therapist PT              PT Recommendation and Plan  Planned Therapy Interventions (PT): bed mobility training, gait training, home exercise program, patient/family education, transfer training, strengthening  Plan of Care Reviewed With: patient, spouse  Outcome Summary: 56 y/o male s/p CABG x 4 on 3/31. PMH Includes HTN. At time of eval, pt with post op lines to include chest tube,  knowles, O2 at 2l/nc, swan, arterial line. Pt reporting of pain chest tube site. Pt able to follow sternal precautions during sit <>stand transfer with verbal cues.Educated on activity restrictions and ex expectation upon dc to pt/spouse. Pt tolerated standing marches/partial squats without report of dizziness or inc pain. Should progress well and be able to dc home with family assist. PPE gloves, mask with  shield.     Time Calculation:   PT Charges     Row Name 04/01/21 1005             Time Calculation    Start Time  0835  -CR      Stop Time  0901  -CR      Time Calculation (min)  26 min  -CR      PT Received On  04/01/21  -CR      PT - Next Appointment  04/02/21  -CR      PT Goal Re-Cert Due Date  04/15/21  -CR         Time Calculation- PT    Total Timed Code Minutes- PT  10 minute(s)  -CR        User Key  (r) = Recorded By, (t) = Taken By, (c) = Cosigned By    Initials Name Provider Type    CR Reyes, Carmela, PT Physical Therapist        Therapy Charges for Today     Code Description Service Date Service Provider Modifiers Qty    40205180788  PT THERAPEUTIC ACT EA 15 MIN 4/1/2021 Reyes, Carmela, PT GP 1    22614087703 HC PT EVAL LOW COMPLEXITY 3 4/1/2021 Reyes, Carmela, PT GP 1          PT G-Codes  Outcome Measure Options: AM-PAC 6 Clicks Basic Mobility (PT)  AM-PAC 6 Clicks Score (PT): 18    Carmela Reyes, PT  4/1/2021

## 2021-04-01 NOTE — PROGRESS NOTES
Discharge Planning Assessment   Sunil     Patient Name: Jean-Claude Clifford Jr.  MRN: [id number]  Today's Date: 4/1/2021    Admit Date: 3/30/2021    Discharge Needs Assessment     Row Name 04/01/21 1624       Living Environment    Lives With  spouse    Current Living Arrangements  home/apartment/condo    Primary Care Provided by  self    Provides Primary Care For  no one    Family Caregiver if Needed  spouse    Able to Return to Prior Arrangements  yes       Resource/Environmental Concerns    Resource/Environmental Concerns  none    Transportation Concerns  car, none       Transition Planning    Patient/Family Anticipates Transition to  home with family    Patient/Family Anticipated Services at Transition  none    Transportation Anticipated  family or friend will provide       Discharge Needs Assessment    Readmission Within the Last 30 Days  no previous admission in last 30 days    Equipment Currently Used at Home  none    Concerns to be Addressed  discharge planning    Anticipated Changes Related to Illness  none    Equipment Needed After Discharge  none    Outpatient/Agency/Support Group Needs  homecare agency    Discharge Facility/Level of Care Needs  home with home health    Provided Post Acute Provider List?  Yes    Post Acute Provider List  Home Health    Delivered To  Support Person    Method of Delivery  In person        Discharge Plan     Row Name 04/01/21 1831       Plan    Plan  anticipate home with [facility]- gave list. need options.    Provided Post Acute Provider List?  Yes    Post Acute Provider List  Home Health    Provided Post Acute Provider Quality & Resource List?  N/A    Delivered To  Support Person    Method of Delivery  In person    Patient/Family in Agreement with Plan  yes    Plan Comments  met with patient and patient spouse at bedside. patient spouse states patient lives at home with spouse. patient independent with ADLs. patient drives. patient spouse will provide transportation at [place].  patient pcp confirmed. patient pharm confirmed. patient spouse reports he has no issues affording meds or food. patient spouse requesting patient to have HHC at [place]. gave HHC agency list. patient has no DME at home at this time. PT to eval. DC barriers: CABG POD #1, monitoring I&O, chest tubes in place        Expected Discharge Date and Time     Expected Discharge Date Expected Discharge Time    Apr 7, 2021         Demographic Summary     Row Name 04/01/21 1623       General Information    Admission Type  inpatient    Arrived From  emergency department    Referral Source  admission list    Reason for Consult  discharge planning    Preferred Language  English     Used During This Interaction  no        Functional Status     Row Name 04/01/21 1623       Functional Status    Usual Activity Tolerance  excellent    Current Activity Tolerance  fair       Functional Status, IADL    Medications  independent    Meal Preparation  independent    Housekeeping  independent    Laundry  independent    Shopping  independent        Met with patient in room wearing PPE: mask, face shield/goggles    Maintained distance greater than six feet and spent less than 15 minutes in the room.        Elena Roberts RN

## 2021-04-02 ENCOUNTER — APPOINTMENT (OUTPATIENT)
Dept: GENERAL RADIOLOGY | Facility: HOSPITAL | Age: 56
End: 2021-04-02

## 2021-04-02 LAB
ANION GAP SERPL CALCULATED.3IONS-SCNC: 8 MMOL/L (ref 5–15)
BUN SERPL-MCNC: 25 MG/DL (ref 6–20)
BUN/CREAT SERPL: 30.5 (ref 7–25)
CALCIUM SPEC-SCNC: 8.7 MG/DL (ref 8.6–10.5)
CHLORIDE SERPL-SCNC: 102 MMOL/L (ref 98–107)
CO2 SERPL-SCNC: 26 MMOL/L (ref 22–29)
CREAT SERPL-MCNC: 0.82 MG/DL (ref 0.76–1.27)
DEPRECATED RDW RBC AUTO: 43.8 FL (ref 37–54)
ERYTHROCYTE [DISTWIDTH] IN BLOOD BY AUTOMATED COUNT: 13.2 % (ref 12.3–15.4)
GFR SERPL CREATININE-BSD FRML MDRD: 97 ML/MIN/1.73
GLUCOSE BLDC GLUCOMTR-MCNC: 101 MG/DL (ref 70–105)
GLUCOSE BLDC GLUCOMTR-MCNC: 104 MG/DL (ref 70–105)
GLUCOSE BLDC GLUCOMTR-MCNC: 110 MG/DL (ref 70–105)
GLUCOSE BLDC GLUCOMTR-MCNC: 115 MG/DL (ref 70–105)
GLUCOSE BLDC GLUCOMTR-MCNC: 118 MG/DL (ref 70–105)
GLUCOSE BLDC GLUCOMTR-MCNC: 121 MG/DL (ref 70–105)
GLUCOSE BLDC GLUCOMTR-MCNC: 123 MG/DL (ref 70–105)
GLUCOSE BLDC GLUCOMTR-MCNC: 132 MG/DL (ref 70–105)
GLUCOSE BLDC GLUCOMTR-MCNC: 87 MG/DL (ref 70–105)
GLUCOSE BLDC GLUCOMTR-MCNC: 97 MG/DL (ref 70–105)
GLUCOSE SERPL-MCNC: 99 MG/DL (ref 65–99)
HCT VFR BLD AUTO: 26.1 % (ref 37.5–51)
HGB BLD-MCNC: 9 G/DL (ref 13–17.7)
MCH RBC QN AUTO: 33.1 PG (ref 26.6–33)
MCHC RBC AUTO-ENTMCNC: 34.7 G/DL (ref 31.5–35.7)
MCV RBC AUTO: 95.4 FL (ref 79–97)
PLATELET # BLD AUTO: 85 10*3/MM3 (ref 140–450)
PMV BLD AUTO: 7.6 FL (ref 6–12)
POTASSIUM SERPL-SCNC: 4.2 MMOL/L (ref 3.5–5.2)
QT INTERVAL: 337 MS
QT INTERVAL: 361 MS
RBC # BLD AUTO: 2.73 10*6/MM3 (ref 4.14–5.8)
SODIUM SERPL-SCNC: 136 MMOL/L (ref 136–145)
WBC # BLD AUTO: 8.3 10*3/MM3 (ref 3.4–10.8)

## 2021-04-02 PROCEDURE — 85027 COMPLETE CBC AUTOMATED: CPT | Performed by: NURSE PRACTITIONER

## 2021-04-02 PROCEDURE — 82962 GLUCOSE BLOOD TEST: CPT

## 2021-04-02 PROCEDURE — 93005 ELECTROCARDIOGRAM TRACING: CPT | Performed by: NURSE PRACTITIONER

## 2021-04-02 PROCEDURE — 71045 X-RAY EXAM CHEST 1 VIEW: CPT

## 2021-04-02 PROCEDURE — 99024 POSTOP FOLLOW-UP VISIT: CPT | Performed by: NURSE PRACTITIONER

## 2021-04-02 PROCEDURE — 97116 GAIT TRAINING THERAPY: CPT

## 2021-04-02 PROCEDURE — 25010000002 FUROSEMIDE PER 20 MG: Performed by: NURSE PRACTITIONER

## 2021-04-02 PROCEDURE — 80048 BASIC METABOLIC PNL TOTAL CA: CPT | Performed by: NURSE PRACTITIONER

## 2021-04-02 PROCEDURE — 99232 SBSQ HOSP IP/OBS MODERATE 35: CPT | Performed by: INTERNAL MEDICINE

## 2021-04-02 PROCEDURE — 25010000002 MORPHINE PER 10 MG: Performed by: NURSE PRACTITIONER

## 2021-04-02 PROCEDURE — 97530 THERAPEUTIC ACTIVITIES: CPT

## 2021-04-02 RX ORDER — DEXTROSE MONOHYDRATE 25 G/50ML
25 INJECTION, SOLUTION INTRAVENOUS
Status: DISCONTINUED | OUTPATIENT
Start: 2021-04-03 | End: 2021-04-04 | Stop reason: HOSPADM

## 2021-04-02 RX ORDER — NICOTINE POLACRILEX 4 MG
15 LOZENGE BUCCAL
Status: DISCONTINUED | OUTPATIENT
Start: 2021-04-03 | End: 2021-04-04 | Stop reason: HOSPADM

## 2021-04-02 RX ORDER — CLOPIDOGREL BISULFATE 75 MG/1
75 TABLET ORAL DAILY
Status: DISCONTINUED | OUTPATIENT
Start: 2021-04-02 | End: 2021-04-04 | Stop reason: HOSPADM

## 2021-04-02 RX ORDER — GUAIFENESIN 600 MG/1
1200 TABLET, EXTENDED RELEASE ORAL EVERY 12 HOURS SCHEDULED
Status: DISCONTINUED | OUTPATIENT
Start: 2021-04-02 | End: 2021-04-04 | Stop reason: HOSPADM

## 2021-04-02 RX ORDER — POTASSIUM CHLORIDE 20 MEQ/1
20 TABLET, EXTENDED RELEASE ORAL DAILY
Status: DISCONTINUED | OUTPATIENT
Start: 2021-04-02 | End: 2021-04-04 | Stop reason: HOSPADM

## 2021-04-02 RX ORDER — INSULIN LISPRO 100 [IU]/ML
0-7 INJECTION, SOLUTION INTRAVENOUS; SUBCUTANEOUS
Status: DISCONTINUED | OUTPATIENT
Start: 2021-04-03 | End: 2021-04-04 | Stop reason: HOSPADM

## 2021-04-02 RX ORDER — INSULIN LISPRO 100 [IU]/ML
0-7 INJECTION, SOLUTION INTRAVENOUS; SUBCUTANEOUS AS NEEDED
Status: DISCONTINUED | OUTPATIENT
Start: 2021-04-03 | End: 2021-04-04 | Stop reason: HOSPADM

## 2021-04-02 RX ORDER — FUROSEMIDE 10 MG/ML
20 INJECTION INTRAMUSCULAR; INTRAVENOUS EVERY 4 HOURS
Status: COMPLETED | OUTPATIENT
Start: 2021-04-02 | End: 2021-04-02

## 2021-04-02 RX ORDER — FUROSEMIDE 40 MG/1
40 TABLET ORAL DAILY
Status: DISCONTINUED | OUTPATIENT
Start: 2021-04-03 | End: 2021-04-04 | Stop reason: HOSPADM

## 2021-04-02 RX ADMIN — METOPROLOL TARTRATE 12.5 MG: 25 TABLET, FILM COATED ORAL at 09:21

## 2021-04-02 RX ADMIN — MORPHINE SULFATE 2 MG: 4 INJECTION INTRAVENOUS at 07:45

## 2021-04-02 RX ADMIN — FUROSEMIDE 20 MG: 10 INJECTION, SOLUTION INTRAMUSCULAR; INTRAVENOUS at 13:55

## 2021-04-02 RX ADMIN — DOCUSATE SODIUM 100 MG: 100 CAPSULE, LIQUID FILLED ORAL at 08:24

## 2021-04-02 RX ADMIN — DOCUSATE SODIUM 100 MG: 100 CAPSULE, LIQUID FILLED ORAL at 20:20

## 2021-04-02 RX ADMIN — HYDROCODONE BITARTRATE AND ACETAMINOPHEN 1 TABLET: 5; 325 TABLET ORAL at 00:11

## 2021-04-02 RX ADMIN — SENNOSIDES 1 TABLET: 8.6 TABLET, FILM COATED ORAL at 08:24

## 2021-04-02 RX ADMIN — MUPIROCIN 1 APPLICATION: 20 OINTMENT TOPICAL at 20:19

## 2021-04-02 RX ADMIN — HYDROCODONE BITARTRATE AND ACETAMINOPHEN 1 TABLET: 5; 325 TABLET ORAL at 04:35

## 2021-04-02 RX ADMIN — POTASSIUM CHLORIDE 20 MEQ: 1500 TABLET, EXTENDED RELEASE ORAL at 09:22

## 2021-04-02 RX ADMIN — PANTOPRAZOLE SODIUM 40 MG: 40 TABLET, DELAYED RELEASE ORAL at 06:13

## 2021-04-02 RX ADMIN — HYDROCODONE BITARTRATE AND ACETAMINOPHEN 1 TABLET: 5; 325 TABLET ORAL at 09:21

## 2021-04-02 RX ADMIN — CHLORHEXIDINE GLUCONATE 15 ML: 1.2 SOLUTION ORAL at 20:19

## 2021-04-02 RX ADMIN — SENNOSIDES 1 TABLET: 8.6 TABLET, FILM COATED ORAL at 20:20

## 2021-04-02 RX ADMIN — CHLORHEXIDINE GLUCONATE 15 ML: 1.2 SOLUTION ORAL at 08:24

## 2021-04-02 RX ADMIN — ASPIRIN 81 MG: 81 TABLET, COATED ORAL at 08:24

## 2021-04-02 RX ADMIN — MORPHINE SULFATE 2 MG: 4 INJECTION INTRAVENOUS at 22:08

## 2021-04-02 RX ADMIN — MORPHINE SULFATE 2 MG: 4 INJECTION INTRAVENOUS at 11:47

## 2021-04-02 RX ADMIN — HYDROCODONE BITARTRATE AND ACETAMINOPHEN 1 TABLET: 5; 325 TABLET ORAL at 20:19

## 2021-04-02 RX ADMIN — ATORVASTATIN CALCIUM 40 MG: 40 TABLET, FILM COATED ORAL at 20:20

## 2021-04-02 RX ADMIN — GUAIFENESIN 1200 MG: 600 TABLET, EXTENDED RELEASE ORAL at 20:20

## 2021-04-02 RX ADMIN — MUPIROCIN 1 APPLICATION: 20 OINTMENT TOPICAL at 08:24

## 2021-04-02 RX ADMIN — POLYETHYLENE GLYCOL 3350 17 G: 17 POWDER, FOR SOLUTION ORAL at 08:24

## 2021-04-02 RX ADMIN — HYDROCODONE BITARTRATE AND ACETAMINOPHEN 1 TABLET: 5; 325 TABLET ORAL at 15:01

## 2021-04-02 RX ADMIN — CLOPIDOGREL BISULFATE 75 MG: 75 TABLET ORAL at 15:02

## 2021-04-02 RX ADMIN — METOPROLOL TARTRATE 12.5 MG: 25 TABLET, FILM COATED ORAL at 20:20

## 2021-04-02 RX ADMIN — FUROSEMIDE 20 MG: 10 INJECTION, SOLUTION INTRAMUSCULAR; INTRAVENOUS at 09:20

## 2021-04-02 RX ADMIN — GUAIFENESIN 1200 MG: 600 TABLET, EXTENDED RELEASE ORAL at 09:21

## 2021-04-02 NOTE — PROGRESS NOTES
LOS: 3 days   Admiting Physician- Félix Taylor MD    Reason For Followup:    Angina pectoris  CAD  CABG x6  Hypertension    Subjective     Patient is sitting up in chair feeling much better but still have sternal pain    Objective     Hemodynamics are stable    Review of Systems:   Review of Systems   Constitutional: Negative for chills and fever.   HENT: Negative for ear discharge and nosebleeds.    Eyes: Negative for discharge and redness.   Cardiovascular: Positive for chest pain. Negative for orthopnea, palpitations, paroxysmal nocturnal dyspnea and syncope.   Respiratory: Negative for cough, shortness of breath and wheezing.    Endocrine: Negative for heat intolerance.   Skin: Negative for rash.   Musculoskeletal: Negative for arthritis and myalgias.   Gastrointestinal: Negative for abdominal pain, melena, nausea and vomiting.   Genitourinary: Negative for dysuria and hematuria.   Neurological: Negative for dizziness, light-headedness, numbness and tremors.   Psychiatric/Behavioral: Negative for depression. The patient is not nervous/anxious.          Vital Signs  Vitals:    04/02/21 1330 04/02/21 1345 04/02/21 1400 04/02/21 1500   BP:       BP Location:       Patient Position:       Pulse: 109 112 97 97   Resp:       Temp:       TempSrc:       SpO2:   95% 94%   Weight:       Height:         Wt Readings from Last 1 Encounters:   04/02/21 85.1 kg (187 lb 9.8 oz)       Intake/Output Summary (Last 24 hours) at 4/2/2021 1613  Last data filed at 4/2/2021 1452  Gross per 24 hour   Intake 1484 ml   Output 2160 ml   Net -676 ml     Physical Exam:  Constitutional:       Appearance: Well-developed.   Eyes:      General: No scleral icterus.        Right eye: No discharge.   HENT:      Head: Normocephalic and atraumatic.   Neck:      Thyroid: No thyromegaly.      Lymphadenopathy: No cervical adenopathy.   Pulmonary:      Effort: Pulmonary effort is normal. No respiratory distress.      Breath sounds: Normal breath  sounds. No wheezing. No rales.   Cardiovascular:      Normal rate. Regular rhythm.      No gallop.   Edema:     Peripheral edema absent.   Abdominal:      Tenderness: There is no abdominal tenderness.   Skin:     Findings: No erythema or rash.   Neurological:      Mental Status: Alert and oriented to person, place, and time.         Results Review:   Lab Results (last 24 hours)     Procedure Component Value Units Date/Time    POC Glucose Once [[id number]]  (Abnormal) Collected: 04/02/21 1353    Specimen: Blood Updated: 04/02/21 1354     Glucose 115 mg/dL      Comment: Serial Number: [id number]:  [id number]       POC Glucose Once [[id number]]  (Normal) Collected: 04/02/21 1144    Specimen: Blood Updated: 04/02/21 1156     Glucose 97 mg/dL      Comment: Serial Number: [id number]:  [id number]       POC Glucose Once [[id number]]  (Normal) Collected: 04/02/21 0856    Specimen: Blood Updated: 04/02/21 0858     Glucose 101 mg/dL      Comment: Serial Number: [id number]:  [id number]       POC Glucose Once [[id number]]  (Normal) Collected: 04/02/21 0729    Specimen: Blood Updated: 04/02/21 0731     Glucose 87 mg/dL      Comment: Serial Number: [id number]:  [id number]       Basic Metabolic Panel [[id number]]  (Abnormal) Collected: 04/02/21 0309    Specimen: Blood Updated: 04/02/21 0349     Glucose 99 mg/dL      BUN 25 mg/dL      Creatinine 0.82 mg/dL      Sodium 136 mmol/L      Potassium 4.2 mmol/L      Chloride 102 mmol/L      CO2 26.0 mmol/L      Calcium 8.7 mg/dL      eGFR Non African Amer 97 mL/min/1.73      BUN/Creatinine Ratio 30.5     Anion Gap 8.0 mmol/L     Narrative:      GFR Normal >60  Chronic Kidney Disease <60  Kidney Failure <15      CBC (No Diff) [[id number]]  (Abnormal) Collected: 04/02/21 0309    Specimen: Blood Updated: 04/02/21 0330     WBC 8.30 10*3/mm3      RBC 2.73 10*6/mm3      Hemoglobin 9.0 g/dL      Hematocrit 26.1 %      MCV 95.4 fL      MCH 33.1 pg      MCHC 34.7 g/dL      RDW  13.2 %      RDW-SD 43.8 fl      MPV 7.6 fL      Platelets 85 10*3/mm3     POC Glucose Once [[id number]]  (Normal) Collected: 04/02/21 0307    Specimen: Blood Updated: 04/02/21 0309     Glucose 101 mg/dL      Comment: Serial Number: [id number]:  [id number]       POC Glucose Once [[id number]]  (Normal) Collected: 04/01/21 2357    Specimen: Blood Updated: 04/01/21 2357     Glucose 103 mg/dL      Comment: Serial Number: [id number]:  [id number]       POC Glucose Once [[id number]]  (Abnormal) Collected: 04/01/21 2028    Specimen: Blood Updated: 04/01/21 2220     Glucose 125 mg/dL      Comment: Serial Number: [id number]:  [id number]           Imaging Results (Last 72 Hours)     Procedure Component Value Units Date/Time    XR Chest 1 View [[id number]] Collected: 04/02/21 1347     Updated: 04/02/21 1350    Narrative:      DATE OF EXAM:  4/2/2021 1:29 PM     PROCEDURE:  XR CHEST 1 VW-     INDICATIONS:  Chest tube removal- timed xray; I25.110-Atherosclerotic heart disease of  native coronary artery with unstable angina pectoris; I25.9-Chronic  ischemic heart disease, unspecified     COMPARISON:  04/02/2021 9:24 AM     TECHNIQUE:   Single radiographic view of the chest was obtained.     FINDINGS:  Chest tube has been removed. Tiny left-sided pneumothorax is again seen.  It measures about 1 cm. Cardiomegaly with postoperative changes the  heart. Bibasilar subsegmental atelectasis.       Impression:      Status post chest tube removal. Small 1 cm apical pneumothorax on left  is present. Stable postoperative changes of the chest.     Electronically Signed By-Sonya Yoder MD On:4/2/2021 1:48 PM  This report was finalized on [id number] by  Sonya Yoder MD.    XR Chest 1 View [[id number]] Collected: 04/02/21 1012     Updated: 04/02/21 1016    Narrative:      DATE OF EXAM:  4/2/2021 9:30 AM     PROCEDURE:  XR CHEST 1 VW-     INDICATIONS:  left pneumothorax  chest pain. Status post CABG 03/31/2021.     COMPARISON:  0527  hours same date chest radiograph     TECHNIQUE:   Single radiographic view of the chest was obtained.     FINDINGS:  The left apical pneumothorax measures about 1.5 cm. Is similar to the  previous exam. Chest tube has unchanged position. Postoperative changes  the heart. Elevated right diaphragm. Left upper lobe airspace opacity  and bibasilar opacities are unchanged. Probable small pleural effusions.       Impression:      No significant change in small left apical pneumothorax. Left-sided  chest tube is present. No change in appearance of lung parenchyma  compared to examination earlier today.     Electronically Signed By-Sonya Yoder MD On:4/2/2021 10:14 AM  This report was finalized on [id number] by  Sonya Yoder MD.    XR Chest 1 View [[id number]] Collected: 04/02/21 0726     Updated: 04/02/21 0730    Narrative:      DATE OF EXAM:  4/2/2021 5:22 AM     PROCEDURE:  XR CHEST 1 VW-     INDICATIONS:  Post-Op Heart Surgery; I25.110-Atherosclerotic heart disease of native  coronary artery with unstable angina pectoris; I25.9-Chronic ischemic  heart disease, unspecified  status post CABG 03/31/2021     COMPARISON:  04/01/2021     TECHNIQUE:   Single radiographic view of the chest was obtained.     FINDINGS:  Lungs are normally expanded. Midline sternotomy wires are present with  changes of CABG. Internal jugular catheter is present on the right. The  [place]-Gabriella catheter has been removed. Left chest tube is present. There  is a small pneumothorax on the left. It measures 1.8 cm. There is  airspace opacity in the left upper lobe and lower lobes which could be  seen with atelectasis.       Impression:      Small pneumothorax on the left with left-sided chest tube.  Removal of the [place]-Gabriella catheter.  Bibasilar opacities and development of a left upper lobe opacity,  atelectasis versus pneumonia.     Electronically Signed By-Sonya Yoder MD On:4/2/2021 7:28 AM  This report was finalized on [id number] by  Sonya Yoder  MD.    XR Chest 1 View [[id number]] Collected: 04/01/21 0725     Updated: 04/01/21 0729    Narrative:         DATE OF EXAM:   4/1/2021 6:34 AM     PROCEDURE:   XR CHEST 1 VW-     INDICATIONS:   Post-Op Heart Surgery; I25.110-Atherosclerotic heart disease of native  coronary artery with unstable angina pectoris; I25.9-Chronic ischemic  heart disease, unspecified     COMPARISON:  3/31/2021     TECHNIQUE:   Portable chest radiograph.     FINDINGS:    Interval extubation and removal of esophagogastric tube. Stable right  IJ Hubbard-Gabriella catheter with tip overlying the main pulmonary arterial  outflow. Left-sided chest tube noted in stable position. Left apical  pneumothorax is decreased in the prior study now measuring 1.6 cm,  previously 2.7 cm. There is increased left basilar airspace disease  which may relate to atelectasis. No pneumothorax on the right.       Impression:      1. Interval extubation and removal of esophagogastric tube.  2. Stable right IJ Hubbard-Gabriella catheter.  2. Stable mediastinal and left thoracostomy tubes with decrease in size  of left apical pneumothorax.  4. Increased left basilar airspace disease may relate to atelectasis  versus pneumonia..     Electronically Signed By-Gurpreet Bojorquez MD On:4/1/2021 7:27 AM  This report was finalized on [id number] by  Gurpreet Bojorquez MD.    XR Chest 1 View [[id number]] Collected: 03/31/21 1228     Updated: 03/31/21 1232    Narrative:      DATE OF EXAM:  3/31/2021 12:01 PM     PROCEDURE:  XR CHEST 1 VW-     INDICATIONS:  Status post cardiac surgery, postoperative follow-up.      COMPARISON:  03/30/2021.     TECHNIQUE:   Single radiographic view of the chest was obtained.     FINDINGS:  The patient has had an interval CABG procedure. The endotracheal tube is  in good position. The Hubbard-Gabriella catheter tip terminates at the level of  the pulmonic valve or main pulmonary artery. There is a left-sided chest  tube in place with a 2.7 cm left apical pneumothorax. There is a  single  mediastinal chest tube in place. The nasogastric tube tip projects out  of the field-of-view, but below the hemidiaphragms. A portion of the  right chest is obscured by defibrillator paddle. Both lungs appear  clear. There is no significant pleural effusion.  The pulmonary vascular  markings are normal.       Impression:         1. Interval CABG procedure.  2. Left-sided chest tube is in place with a 2.7 cm left apical  pneumothorax.  3. Additional lines and support tubes are in place as described.  4. The lungs appear clear.     Electronically Signed By-Justin Miles MD On:3/31/2021 12:30 PM  This report was finalized on [id number] by  Justin Miles MD.        ECG/EMG Results (most recent)     Procedure Component Value Units Date/Time    ECG 12 Lead [[id number]] Collected: 03/30/21 1540     Updated: 03/30/21 1541     QT Interval 289 ms     Narrative:      HEART RATE= 113  bpm  RR Interval= 528  ms  WV Interval= 168  ms  P Horizontal Axis= 42  deg  P Front Axis= 44  deg  QRSD Interval= 99  ms  QT Interval= 289  ms  QRS Axis= 32  deg  T Wave Axis= 193  deg  - ABNORMAL ECG -  Sinus tachycardia  Repol abnrm suggests ischemia, diffuse leads  When compared with ECG of 28-Feb-2021 16:15:34,  Significant rate increase  Significant repolarization change  Electronically Signed By:   Date and Time of Study: 2021-03-30 15:40:38    ECG 12 Lead [[id number]] Collected: 03/31/21 1151     Updated: 03/31/21 1152     QT Interval 391 ms     Narrative:      HEART RATE= 71  bpm  RR Interval= 840  ms  WV Interval= 186  ms  P Horizontal Axis= 11  deg  P Front Axis= 12  deg  QRSD Interval= 123  ms  QT Interval= 391  ms  QRS Axis= 18  deg  T Wave Axis= 60  deg  - ABNORMAL ECG -  Sinus rhythm  Probable inferior infarct, recent  When compared with ECG of 30-Mar-2021 15:40:38,  Significant rate decrease  New or worsened ischemia or infarction  Significant repolarization change  Electronically Signed By:   Date and Time of Study:  2021-03-31 11:51:33    ECG 12 Lead [[id number]] Collected: 04/02/21 0315     Updated: 04/02/21 0316     QT Interval 361 ms     Narrative:      HEART RATE= 92  bpm  RR Interval= 652  ms  HI Interval= 133  ms  P Horizontal Axis= -14  deg  P Front Axis= 32  deg  QRSD Interval= 113  ms  QT Interval= 361  ms  QRS Axis= -8  deg  T Wave Axis= -5  deg  - ABNORMAL ECG -  Sinus rhythm  Incomplete right bundle branch block  Probable left ventricular hypertrophy  Inferior infarct, age indeterminate  Electronically Signed By:   Date and Time of Study: 2021-04-02 03:15:13    ECG 12 Lead [[id number]] Collected: 04/01/21 0326     Updated: 04/02/21 0621     QT Interval 337 ms     Narrative:      HEART RATE= 86  bpm  RR Interval= 696  ms  HI Interval= 143  ms  P Horizontal Axis= -12  deg  P Front Axis= 40  deg  QRSD Interval= 71  ms  QT Interval= 337  ms  QRS Axis= -7  deg  T Wave Axis= 17  deg  - ABNORMAL ECG -  Sinus rhythm  Inferior infarct, old  ST elevation, consider anterolateral injury  When compared with ECG of 31-Mar-2021 11:51:33,  Nonspecific significant change  Electronically Signed By: Thiago Anne ([facility], [place]) 02-Apr-2021 06:20:44  Date and Time of Study: 2021-04-01 03:26:52        CBC    Results from last 7 days   Lab Units 04/02/21  0309 04/01/21  0311 03/31/21  1941 03/31/21  1830 03/31/21  1744 03/31/21  1653 03/31/21  1618 03/31/21  1202 03/31/21  0426 03/30/21  1554 03/30/21  1554 03/27/21  0750   WBC 10*3/mm3 8.30 9.40  --   --   --   --   --  14.70* 13.10*  --  6.90 4.19   HEMOGLOBIN g/dL 9.0* 9.7*  --   --   --   --   --  12.7* 14.7  --  14.6 14.6   HEMOGLOBIN, POC g/dL  --   --  9.6* 10.0* 10.4* 11.0* 11.0* 12.1  --    < >  --   --    PLATELETS 10*3/mm3 85* 115*  --   --   --   --   --  122* 217  --  213 210    < > = values in this interval not displayed.     BMP   Results from last 7 days   Lab Units 04/02/21  0309 04/01/21  1555 04/01/21  1209 04/01/21  0311 03/31/21  1202 03/31/21  0426 03/27/21  0750   SODIUM  mmol/L 136 135* 137 142 140 138 137   POTASSIUM mmol/L 4.2 3.7 4.3 4.4 4.3 4.3 4.5   CHLORIDE mmol/L 102 101 103 107 107 104 103   CO2 mmol/L 26.0 23.0 22.0 23.0 22.0 22.0 25.5   BUN mg/dL 25* 26* 28* 25* 15 16 9   CREATININE mg/dL 0.82 1.15 1.18 1.17 1.00 0.98 0.92   GLUCOSE mg/dL 99 182* 149* 140* 140* 144* 113*   MAGNESIUM mg/dL  --   --   --  3.1*  --   --   --    PHOSPHORUS mg/dL  --   --   --  5.0* 3.5  --   --      CMP   Results from last 7 days   Lab Units 04/02/21  0309 04/01/21  1555 04/01/21  1209 04/01/21  0311 03/31/21  1202 03/31/21  0426 03/27/21  0750   SODIUM mmol/L 136 135* 137 142 140 138 137   POTASSIUM mmol/L 4.2 3.7 4.3 4.4 4.3 4.3 4.5   CHLORIDE mmol/L 102 101 103 107 107 104 103   CO2 mmol/L 26.0 23.0 22.0 23.0 22.0 22.0 25.5   BUN mg/dL 25* 26* 28* 25* 15 16 9   CREATININE mg/dL 0.82 1.15 1.18 1.17 1.00 0.98 0.92   GLUCOSE mg/dL 99 182* 149* 140* 140* 144* 113*   ALBUMIN g/dL  --   --   --  4.50 4.30 4.20  --    BILIRUBIN mg/dL  --   --   --   --   --  0.5  --    ALK PHOS U/L  --   --   --   --   --  98  --    AST (SGOT) U/L  --   --   --   --   --  32  --    ALT (SGPT) U/L  --   --   --   --   --  39  --      Cardiac Studies:  Echo- Results for orders placed in visit on 03/31/21    Emergent/Open-Heart Anesthesia ANGELICA    Narrative  Preanesthesia Checklist:  Patient identified, IV assessed, risks and benefits discussed, monitors and equipment assessed, procedure being performed at surgeon's request and anesthesia consent obtained.    General Procedure Information  ANGELICA Placed for monitoring purposes only -- This is not a diagnostic ANGELICA  Diagnostic Indications for Echo:  assessment of surgical repair and hemodynamic monitoring  Physician Requesting Echo: Axel Smyth MD  Location performed:  OR  Intubated  Bite block placed  Heart visualized  Probe Insertion:  Easy  Probe Type:  Multiplane  Modalities:  Color flow mapping    Echocardiographic and Doppler Measurements    Ventricles    Right  Ventricle:  Cavity size normal.  Hypertrophy not present.  Thrombus not present.  Left Ventricle:  Cavity size normal.  Hypertrophy not present.  Thrombus not present.  Global Function normal.  Ejection Fraction 60%.        Valves    Aortic Valve:  Annulus normal.  Stenosis not present.  Regurgitation absent.  Leaflets normal.  Leaflet motions normal.    Mitral Valve:  Annulus normal.  Stenosis not present.  Regurgitation trace.  Leaflets normal.  Leaflet motions normal.    Tricuspid Valve:  Annulus normal.            Atria    Right Atrium:  Size normal.  Left Atrium:  Size normal.    Septa    Atrial Septum:  Intra-atrial septal morphology normal.            Other Findings  Pericardium:  normal  Pleural Effusion:  none      Anesthesia Information  Performed Personally  Anesthesiologist:  Fuad Chowdhury MD      Echocardiogram Comments:  Post bypass EF 60%. Trace MR. No AI or AS.    Stress Myoview-  Cath-      Medication Review:   Scheduled Meds:aspirin, 81 mg, Oral, Daily  atorvastatin, 40 mg, Oral, Nightly  chlorhexidine, 15 mL, Mouth/Throat, Q12H  clopidogrel, 75 mg, Oral, Daily  docusate sodium, 100 mg, Oral, BID  enoxaparin, 40 mg, Subcutaneous, Daily  [START ON 4/3/2021] furosemide, 40 mg, Oral, Daily  guaiFENesin, 1,200 mg, Oral, Q12H  [START ON 4/3/2021] insulin lispro, 0-7 Units, Subcutaneous, TID AC  metoprolol tartrate, 12.5 mg, Oral, Q12H  mupirocin, 1 application, Each Nare, BID  pantoprazole, 40 mg, Oral, Q AM  polyethylene glycol, 17 g, Oral, Daily  potassium chloride, 20 mEq, Oral, Daily  senna, 1 tablet, Oral, BID      Continuous Infusions:dexmedetomidine, 0.2-1.5 mcg/kg/hr, Last Rate: Stopped (03/31/21 2115)  insulin, 0-50 Units/hr, Last Rate: 0.6 Units/hr (04/02/21 1540)  niCARdipine, 5-15 mg/hr  nitroglycerin, 5-50 mcg/min  norepinephrine, 0.02-0.06 mcg/kg/min, Last Rate: Stopped (04/01/21 1100)  sodium chloride, 30 mL/hr, Last Rate: 30 mL/hr (04/02/21 0700)      PRN Meds:.[START ON  4/3/2021] dextrose  •  [START ON 4/3/2021] dextrose  •  HYDROcodone-acetaminophen  •  [START ON 4/3/2021] insulin lispro **AND** [START ON 4/3/2021] insulin lispro  •  insulin  •  Morphine **AND** naloxone  •  Mouth Kote  •  niCARdipine  •  nitroglycerin  •  ondansetron  •  potassium chloride **OR** potassium chloride  •  potassium chloride **OR** potassium chloride      Assessment/Plan   Patient Active Problem List   Diagnosis   • Degenerative joint disease of right acromioclavicular joint   • Nontraumatic tear of rotator cuff   • Other tear of medial meniscus, current injury, left knee, subsequent encounter   • Orthopedic aftercare   • Hemorrhoids   • Overweight with body mass index (BMI) of 28 to 28.9 in adult   • Short of breath on exertion   • Actinic keratosis   • Other chest pain   • Bradycardia, sinus   • Chest pain due to myocardial ischemia   • Coronary artery disease involving native coronary artery of native heart with unstable angina pectoris (CMS/HCC)     MDM:    1.  CAD/s/p CABG:    Patient is doing well.  Patient is sitting up in chair.  Hemodynamics are stable.  Continue supportive therapy    2.  Hypertension:    Hemodynamics are stable    3.  Tachycardia:    I would recommend to start low-dose beta-blocker    4.  I would recommend that patient should be started on low-dose diuretics and also Plavix we shall follow    Félix Taylor MD  04/02/21  16:13 EDT

## 2021-04-02 NOTE — PROGRESS NOTES
Continued Stay Note  MISHA Barber     Patient Name: Jean-Claude Clifford Jr.  MRN: [id number]  Today's Date: 4/2/2021    Admit Date: 3/30/2021    Discharge Plan     Row Name 04/02/21 1621       Plan    Plan  anticipte home with [facility] referral pending    Plan Comments  met with patient and patient spouse at bedside. patient wants referral sent to [facility]. Sent in [facility] and liaison notified. PT re-eval pending. DC barriers: POD #2, monitoring I&O, IV gtt        Expected Discharge Date and Time     Expected Discharge Date Expected Discharge Time    Apr 7, 2021           Met with patient in room wearing PPE: mask, face shield/goggles    Maintained distance greater than six feet and spent less than 15 minutes in the room.        Elena Roberts RN

## 2021-04-02 NOTE — PLAN OF CARE
Pt demonstrating good progress toward mobility goals.  Pt able to ambulate 150 ft with RW and an additional 150 ft without AD.  Pt has strong support of family at home.  Plan home with assist of family and HHPT.    Sadie Gaffney PT, DPT, GCS

## 2021-04-02 NOTE — DISCHARGE PLACEMENT REQUEST
"Myrna Kingston Jr. (55 y.o. Male)     Date of Birth Social Security Number Address Home Phone MRN    1965  [address] [place] [address] [phone] [id number]    Yarsani Marital Status          [facility]        Admission Date Admission Type Admitting Provider Attending Provider Department, Room/Bed    3/30/21 Elective Axel Smyth MD Raza, Syed T, MD [facility], 2209/1    Discharge Date Discharge Disposition Discharge Destination                       Attending Provider: Félix Taylor MD    Allergies: Shellfish Allergy    Isolation: None   Infection: None   Code Status: CPR    Ht: 172.7 cm (68\")   Wt: 85.1 kg (187 lb 9.8 oz)    Admission Cmt: None   Principal Problem: Chest pain due to myocardial ischemia [I25.9] More...                 Active Insurance as of 3/30/2021     Primary Coverage     Payor Plan Insurance Group Employer/Plan Group    MISC COMMERCIAL MISC COMMERCIAL [id number]     Coverage Address Coverage Phone Number Coverage Fax Number Effective Dates     [address] [address] [phone]  12/1/2017 - None Entered    [facility] [id number]       Subscriber Name Subscriber Birth Date Member ID       MYRNA KINGSTON JR. 1965 [id number]                 Emergency Contacts      (Rel.) Home Phone Work Phone Mobile Phone    JAYY KINGSTON (Spouse) -- -- [phone]              "

## 2021-04-02 NOTE — THERAPY TREATMENT NOTE
Subjective: Pt agreeable to therapeutic plan of care.    Objective:     Bed mobility - Mod-A for trunk control   Transfers - CGA and Min-A  Ambulation - 150 ft with RW, 150 ft without AD SBA and CGA     Cardiac level III    Phase 1 Cardiac Rehab Initiated    Sitting tolerance: 1-5min  Standing tolerance: 1-5min    Precautions:   Mid-sternal incision; avoid scapular retraction and depression.   Cardiovascular impairment post-sx; encourage energy conservation strategies.    Pain: 5 VAS incisional pain  Education: Provided education on importance of mobility and skilled verbal / tactile cueing throughout intervention.     Assessment: Jean-Claude MAIKOL Clifford Jr. presents with functional mobility impairments which indicate the need for skilled intervention. Tolerating session today without incident.  Pt able to progress with functional mobility and completed two bouts of longer distances ambulation.  Gait assessed with and without use of RW.  Pt requiring SBA for ambulation using RW and CGA for ambulation without AD.  Pt needing CGA-Twan for safety with transfers. Will continue to follow and progress as tolerated.     Plan/Recommendations:   Pt would benefit from Home with family assist and and Home Health at discharge from facility and requires no DME at discharge.  It is anticipated pt will not need RW at d/c.  Pt desires Home with family assist and and Home Health at discharge. Pt cooperative; agreeable to therapeutic recommendations and plan of care.     Basic Mobility 6-click:  Rollin = Total, A lot = 2, A little = 3; 4 = None  Supine>Sit:   1 = Total, A lot = 2, A little = 3; 4 = None   Sit>Stand with arms:  1 = Total, A lot = 2, A little = 3; 4 = None  Bed>Chair:   1 = Total, A lot = 2, A little = 3; 4 = None  Ambulate in room:  1 = Total, A lot = 2, A little = 3; 4 = None  3-5 Steps with railin = Total, A lot = 2, A little = 3; 4 = None  Score: 16     Post-Tx Position: Up in Chair and Call light  and personal items within reach, wife present  PPE: gloves, surgical mask, eyewear protection

## 2021-04-02 NOTE — PLAN OF CARE
Goal Outcome Evaluation:  Plan of Care Reviewed With: patient, daughter, spouse  Progress: improving  Outcome Summary: Patient is currently post op day 2. Patient had simon, central line, knowles, and chest tubes removed today. Patient remains on insulin drip at this time. AV wires remain. Beta blocker started today. Patient ambulating well around the hospital. Will continue to monitor progress.

## 2021-04-02 NOTE — PROGRESS NOTES
" LOS: 3 days   Patient Care Team:  David Vallejo Sr., MD as PCP - General    Chief Complaint: post op fu    Subjective:  Symptoms:  No shortness of breath.    Diet:  No nausea.    Activity level: Returning to normal.    Pain:  Pain is partially controlled and requiring pain medication.          Vital Signs  Temp:  [97.4 °F (36.3 °C)-98.6 °F (37 °C)] 98.6 °F (37 °C)  Heart Rate:  [] 97  Resp:  [16-24] 24  BP: (110)/(58) 110/58  Arterial Line BP: (100-143)/(45-65) 143/58  Body mass index is 28.53 kg/m².    Intake/Output Summary (Last 24 hours) at 4/2/2021 0838  Last data filed at 4/2/2021 0600  Gross per 24 hour   Intake 2024.91 ml   Output 2115 ml   Net -90.09 ml     No intake/output data recorded.    Chest tube drainage last 8 hours:  70 (no air leak)        03/30/21  2100 04/01/21  0600 04/02/21  0600   Weight: 80.7 kg (177 lb 14.6 oz) 83.9 kg (184 lb 15.5 oz) 85.1 kg (187 lb 9.8 oz)         Objective:  General Appearance:  In pain.    Vital signs: (most recent): Blood pressure 110/58, pulse 97, temperature 98.6 °F (37 °C), temperature source Oral, resp. rate 24, height 172.7 cm (68\"), weight 85.1 kg (187 lb 9.8 oz), SpO2 99 %.    Output: Producing urine.    Lungs:  Normal effort and normal respiratory rate.  There are rales (right base) and decreased breath sounds (right lower lobe).  (O2 at 4 liters)  Heart: Normal rate.  Regular rhythm.  S1 normal and S2 normal.    Abdomen: Abdomen is soft and non-distended.    Extremities: There is no dependent edema.    Neurological: Patient is alert and oriented to person, place and time.    Skin:  Warm and dry.  (Sternal dressing dry and intact)            Results Review:        WBC WBC   Date Value Ref Range Status   04/02/2021 8.30 3.40 - 10.80 10*3/mm3 Final   04/01/2021 9.40 3.40 - 10.80 10*3/mm3 Final   03/31/2021 14.70 (H) 3.40 - 10.80 10*3/mm3 Final   03/31/2021 13.10 (H) 3.40 - 10.80 10*3/mm3 Final   03/30/2021 6.90 3.40 - 10.80 10*3/mm3 Final      HGB " Hemoglobin   Date Value Ref Range Status   04/02/2021 9.0 (L) 13.0 - 17.7 g/dL Final   04/01/2021 9.7 (L) 13.0 - 17.7 g/dL Final     Comment:     Result checked    03/31/2021 9.6 (L) 12.0 - 17.0 g/dL Final   03/31/2021 10.0 (L) 12.0 - 17.0 g/dL Final   03/31/2021 10.4 (L) 12.0 - 17.0 g/dL Final   03/31/2021 11.0 (L) 12.0 - 17.0 g/dL Final   03/31/2021 11.0 (L) 12.0 - 17.0 g/dL Final   03/31/2021 10.9 (L) 12.0 - 17.0 g/dL Final   03/31/2021 11.0 (L) 12.0 - 17.0 g/dL Final   03/31/2021 12.0 12.0 - 17.0 g/dL Final   03/31/2021 12.2 12.0 - 17.0 g/dL Final   03/31/2021 12.7 (L) 13.0 - 17.7 g/dL Final     Comment:     Result checked   03/31/2021 12.1 12.0 - 17.0 g/dL Final   03/31/2021 10.2 (L) 12.0 - 17.0 g/dL Final   03/31/2021 10.9 (L) 12.0 - 17.0 g/dL Final   03/31/2021 11.2 (L) 12.0 - 17.0 g/dL Final   03/31/2021 11.2 (L) 12.0 - 17.0 g/dL Final   03/31/2021 13.3 12.0 - 17.0 g/dL Final   03/31/2021 14.7 13.0 - 17.7 g/dL Final   03/30/2021 14.6 13.0 - 17.7 g/dL Final      HCT Hematocrit   Date Value Ref Range Status   04/02/2021 26.1 (L) 37.5 - 51.0 % Final   04/01/2021 28.1 (L) 37.5 - 51.0 % Final   03/31/2021 28 (L) 38 - 51 % Final   03/31/2021 29 (L) 38 - 51 % Final   03/31/2021 31 (L) 38 - 51 % Final   03/31/2021 32 (L) 38 - 51 % Final   03/31/2021 32 (L) 38 - 51 % Final   03/31/2021 32 (L) 38 - 51 % Final   03/31/2021 32 (L) 38 - 51 % Final   03/31/2021 35 (L) 38 - 51 % Final   03/31/2021 36 (L) 38 - 51 % Final   03/31/2021 36.8 (L) 37.5 - 51.0 % Final   03/31/2021 36 (L) 38 - 51 % Final   03/31/2021 30 (L) 38 - 51 % Final   03/31/2021 32 (L) 38 - 51 % Final   03/31/2021 33 (L) 38 - 51 % Final   03/31/2021 33 (L) 38 - 51 % Final   03/31/2021 39 38 - 51 % Final   03/31/2021 41.5 37.5 - 51.0 % Final   03/30/2021 41.7 37.5 - 51.0 % Final      Platelets Platelets   Date Value Ref Range Status   04/02/2021 85 (L) 140 - 450 10*3/mm3 Final   04/01/2021 115 (L) 140 - 450 10*3/mm3 Final   03/31/2021 122 (L) 140 - 450  10*3/mm3 Final   03/31/2021 217 140 - 450 10*3/mm3 Final   03/30/2021 213 140 - 450 10*3/mm3 Final        PT/INR:    Protime   Date Value Ref Range Status   04/01/2021 11.4 9.6 - 11.7 Seconds Final   03/31/2021 12.3 (H) 9.6 - 11.7 Seconds Final   03/30/2021 10.5 9.6 - 11.7 Seconds Final   /  INR   Date Value Ref Range Status   04/01/2021 1.04 0.93 - 1.10 Final   03/31/2021 1.12 (H) 0.93 - 1.10 Final   03/30/2021 0.95 0.93 - 1.10 Final       Sodium Sodium   Date Value Ref Range Status   04/02/2021 136 136 - 145 mmol/L Final   04/01/2021 135 (L) 136 - 145 mmol/L Final   04/01/2021 137 136 - 145 mmol/L Final   04/01/2021 142 136 - 145 mmol/L Final   03/31/2021 140 136 - 145 mmol/L Final   03/31/2021 138 136 - 145 mmol/L Final      Potassium Potassium   Date Value Ref Range Status   04/02/2021 4.2 3.5 - 5.2 mmol/L Final   04/01/2021 3.7 3.5 - 5.2 mmol/L Final   04/01/2021 4.3 3.5 - 5.2 mmol/L Final     Comment:     Slight hemolysis detected by analyzer. Results may be affected.   04/01/2021 4.4 3.5 - 5.2 mmol/L Final   03/31/2021 4.3 3.5 - 5.2 mmol/L Final   03/31/2021 4.3 3.5 - 5.2 mmol/L Final      Chloride Chloride   Date Value Ref Range Status   04/02/2021 102 98 - 107 mmol/L Final   04/01/2021 101 98 - 107 mmol/L Final   04/01/2021 103 98 - 107 mmol/L Final   04/01/2021 107 98 - 107 mmol/L Final   03/31/2021 107 98 - 107 mmol/L Final   03/31/2021 104 98 - 107 mmol/L Final      Bicarbonate CO2   Date Value Ref Range Status   04/02/2021 26.0 22.0 - 29.0 mmol/L Final   04/01/2021 23.0 22.0 - 29.0 mmol/L Final   04/01/2021 22.0 22.0 - 29.0 mmol/L Final   04/01/2021 23.0 22.0 - 29.0 mmol/L Final   03/31/2021 22.0 22.0 - 29.0 mmol/L Final   03/31/2021 22.0 22.0 - 29.0 mmol/L Final      BUN BUN   Date Value Ref Range Status   04/02/2021 25 (H) 6 - 20 mg/dL Final   04/01/2021 26 (H) 6 - 20 mg/dL Final   04/01/2021 28 (H) 6 - 20 mg/dL Final   04/01/2021 25 (H) 6 - 20 mg/dL Final   03/31/2021 15 6 - 20 mg/dL Final    03/31/2021 16 6 - 20 mg/dL Final      Creatinine Creatinine   Date Value Ref Range Status   04/02/2021 0.82 0.76 - 1.27 mg/dL Final   04/01/2021 1.15 0.76 - 1.27 mg/dL Final   04/01/2021 1.18 0.76 - 1.27 mg/dL Final   04/01/2021 1.17 0.76 - 1.27 mg/dL Final   03/31/2021 1.00 0.76 - 1.27 mg/dL Final   03/31/2021 0.98 0.76 - 1.27 mg/dL Final      Calcium Calcium   Date Value Ref Range Status   04/02/2021 8.7 8.6 - 10.5 mg/dL Final   04/01/2021 8.6 8.6 - 10.5 mg/dL Final   04/01/2021 8.9 8.6 - 10.5 mg/dL Final   04/01/2021 9.2 8.6 - 10.5 mg/dL Final   03/31/2021 9.3 8.6 - 10.5 mg/dL Final   03/31/2021 9.2 8.6 - 10.5 mg/dL Final      Magnesium Magnesium   Date Value Ref Range Status   04/01/2021 3.1 (H) 1.6 - 2.6 mg/dL Final      Troponin No results found for: TROPONIN   BNP No results found for: BNP         aspirin, 81 mg, Oral, Daily  atorvastatin, 40 mg, Oral, Nightly  chlorhexidine, 15 mL, Mouth/Throat, Q12H  docusate sodium, 100 mg, Oral, BID  enoxaparin, 40 mg, Subcutaneous, Daily  mupirocin, 1 application, Each Nare, BID  pantoprazole, 40 mg, Oral, Q AM  polyethylene glycol, 17 g, Oral, Daily  senna, 1 tablet, Oral, BID      dexmedetomidine, 0.2-1.5 mcg/kg/hr, Last Rate: Stopped (03/31/21 2115)  insulin, 0-50 Units/hr, Last Rate: 0.9 Units/hr (04/02/21 0739)  niCARdipine, 5-15 mg/hr  nitroglycerin, 5-50 mcg/min  norepinephrine, 0.02-0.06 mcg/kg/min, Last Rate: Stopped (04/01/21 1100)  sodium chloride, 30 mL/hr, Last Rate: 30 mL/hr (04/02/21 0700)        PRN Meds:.HYDROcodone-acetaminophen  •  insulin  •  Morphine **AND** naloxone  •  Mouth Kote  •  niCARdipine  •  nitroglycerin  •  ondansetron  •  potassium chloride **OR** potassium chloride  •  potassium chloride **OR** potassium chloride    Patient Active Problem List   Diagnosis Code   • Degenerative joint disease of right acromioclavicular joint M19.011   • Nontraumatic tear of rotator cuff M75.100   • Other tear of medial meniscus, current injury, left  knee, subsequent encounter S83.242D   • Orthopedic aftercare Z47.89   • Hemorrhoids K64.9   • Overweight with body mass index (BMI) of 28 to 28.9 in adult E66.3, Z68.28   • Short of breath on exertion R06.02   • Actinic keratosis L57.0   • Other chest pain R07.89   • Bradycardia, sinus R00.1   • Chest pain due to myocardial ischemia I25.9   • Coronary artery disease involving native coronary artery of native heart with unstable angina pectoris (CMS/[facility]) I25.110       Assessment & Plan    -CAD, EF 65% (by cath) s/p CABG x4 with LIMA-----POD #2 (Smyth), tight RCA resume Plavix  -HTN----hypotensive post op, off pressors  -Preop sinus zenaida----maintaining NSR  -Hypoxemia----down to 4 liters  -Post op expected acute blood loss anemia----continue to monitor  -TCP----reactive, hold Lovenox until platelet count >100    Place chest tubes water seal, repeat cxr.  Start low dose lopressor, give Lasix 20 mg IV x2, start po tomorrow, wean O2.  D/c arterial line, cordis, knowles.  Increase IS frequency and depth, start mucinex.  Discussed native disease with Dr. Taylor, will start plavix today.     DOLORES Thompson  04/02/21  08:38 EDT

## 2021-04-02 NOTE — PROGRESS NOTES
Continued Stay Note   Sunil     Patient Name: Jean-Claude Clifford Jr.  MRN: [id number]  Today's Date: 4/2/2021    Admit Date: 3/30/2021    Discharge Plan     Row Name 04/02/21 1633       Plan    Plan  anticipate home with family- refusing HHC    Plan Comments  called patients wife to update that [facility] declined to due insurance being out of network. patient wife states patient is doing much better today and she does not anticipate needing HHC at this time.    Row Name 04/02/21 1621       Plan    Plan  anticipte home with HHC- [facility] referral pending    Plan Comments  met with patient and patient spouse at bedside. [facility] Sent in epic and liaison notified. PT re-eval pending. DC barriers: POD #2, monitoring I&O, IV gtt        Expected Discharge Date and Time     Expected Discharge Date Expected Discharge Time    Apr 7, 2021         Phone communication or documentation only - no physical contact with patient or family.      Elena Roberts, RN

## 2021-04-02 NOTE — NURSING NOTE
Notified Amina Will NP of patients post chest tube removal chest xray (small pneumo). Amina advised to place patient on 2L of oxygen and recheck a chest xray tomorrow morning. Will continue to monitor progress.

## 2021-04-03 ENCOUNTER — APPOINTMENT (OUTPATIENT)
Dept: GENERAL RADIOLOGY | Facility: HOSPITAL | Age: 56
End: 2021-04-03

## 2021-04-03 LAB
ANION GAP SERPL CALCULATED.3IONS-SCNC: 9 MMOL/L (ref 5–15)
BUN SERPL-MCNC: 22 MG/DL (ref 6–20)
BUN/CREAT SERPL: 28.2 (ref 7–25)
CALCIUM SPEC-SCNC: 8.8 MG/DL (ref 8.6–10.5)
CHLORIDE SERPL-SCNC: 100 MMOL/L (ref 98–107)
CO2 SERPL-SCNC: 28 MMOL/L (ref 22–29)
CREAT SERPL-MCNC: 0.78 MG/DL (ref 0.76–1.27)
DEPRECATED RDW RBC AUTO: 42.4 FL (ref 37–54)
ERYTHROCYTE [DISTWIDTH] IN BLOOD BY AUTOMATED COUNT: 12.8 % (ref 12.3–15.4)
GFR SERPL CREATININE-BSD FRML MDRD: 103 ML/MIN/1.73
GLUCOSE BLDC GLUCOMTR-MCNC: 102 MG/DL (ref 70–105)
GLUCOSE BLDC GLUCOMTR-MCNC: 119 MG/DL (ref 70–105)
GLUCOSE SERPL-MCNC: 103 MG/DL (ref 65–99)
HCT VFR BLD AUTO: 27.4 % (ref 37.5–51)
HGB BLD-MCNC: 9.6 G/DL (ref 13–17.7)
MCH RBC QN AUTO: 33.8 PG (ref 26.6–33)
MCHC RBC AUTO-ENTMCNC: 35.1 G/DL (ref 31.5–35.7)
MCV RBC AUTO: 96.1 FL (ref 79–97)
PLATELET # BLD AUTO: 96 10*3/MM3 (ref 140–450)
PMV BLD AUTO: 7.7 FL (ref 6–12)
POTASSIUM SERPL-SCNC: 3.9 MMOL/L (ref 3.5–5.2)
RBC # BLD AUTO: 2.85 10*6/MM3 (ref 4.14–5.8)
SODIUM SERPL-SCNC: 137 MMOL/L (ref 136–145)
WBC # BLD AUTO: 8.2 10*3/MM3 (ref 3.4–10.8)

## 2021-04-03 PROCEDURE — 85027 COMPLETE CBC AUTOMATED: CPT | Performed by: NURSE PRACTITIONER

## 2021-04-03 PROCEDURE — 71045 X-RAY EXAM CHEST 1 VIEW: CPT

## 2021-04-03 PROCEDURE — 99024 POSTOP FOLLOW-UP VISIT: CPT | Performed by: THORACIC SURGERY (CARDIOTHORACIC VASCULAR SURGERY)

## 2021-04-03 PROCEDURE — 80048 BASIC METABOLIC PNL TOTAL CA: CPT | Performed by: NURSE PRACTITIONER

## 2021-04-03 PROCEDURE — 99233 SBSQ HOSP IP/OBS HIGH 50: CPT | Performed by: INTERNAL MEDICINE

## 2021-04-03 PROCEDURE — 82962 GLUCOSE BLOOD TEST: CPT

## 2021-04-03 PROCEDURE — 93005 ELECTROCARDIOGRAM TRACING: CPT | Performed by: THORACIC SURGERY (CARDIOTHORACIC VASCULAR SURGERY)

## 2021-04-03 RX ORDER — AMIODARONE HYDROCHLORIDE 200 MG/1
200 TABLET ORAL
Status: DISCONTINUED | OUTPATIENT
Start: 2021-04-03 | End: 2021-04-04 | Stop reason: HOSPADM

## 2021-04-03 RX ORDER — ECHINACEA PURPUREA EXTRACT 125 MG
2 TABLET ORAL AS NEEDED
Status: DISCONTINUED | OUTPATIENT
Start: 2021-04-03 | End: 2021-04-04 | Stop reason: HOSPADM

## 2021-04-03 RX ADMIN — FUROSEMIDE 40 MG: 40 TABLET ORAL at 08:19

## 2021-04-03 RX ADMIN — CLOPIDOGREL BISULFATE 75 MG: 75 TABLET ORAL at 08:19

## 2021-04-03 RX ADMIN — SENNOSIDES 1 TABLET: 8.6 TABLET, FILM COATED ORAL at 21:19

## 2021-04-03 RX ADMIN — METOPROLOL TARTRATE 12.5 MG: 25 TABLET, FILM COATED ORAL at 21:19

## 2021-04-03 RX ADMIN — HYDROCODONE BITARTRATE AND ACETAMINOPHEN 1 TABLET: 5; 325 TABLET ORAL at 06:21

## 2021-04-03 RX ADMIN — HYDROCODONE BITARTRATE AND ACETAMINOPHEN 1 TABLET: 5; 325 TABLET ORAL at 10:08

## 2021-04-03 RX ADMIN — PANTOPRAZOLE SODIUM 40 MG: 40 TABLET, DELAYED RELEASE ORAL at 06:21

## 2021-04-03 RX ADMIN — CHLORHEXIDINE GLUCONATE 15 ML: 1.2 SOLUTION ORAL at 21:20

## 2021-04-03 RX ADMIN — HYDROCODONE BITARTRATE AND ACETAMINOPHEN 1 TABLET: 5; 325 TABLET ORAL at 18:40

## 2021-04-03 RX ADMIN — POTASSIUM CHLORIDE 20 MEQ: 1500 TABLET, EXTENDED RELEASE ORAL at 08:19

## 2021-04-03 RX ADMIN — ASPIRIN 81 MG: 81 TABLET, COATED ORAL at 08:18

## 2021-04-03 RX ADMIN — POLYETHYLENE GLYCOL 3350 17 G: 17 POWDER, FOR SOLUTION ORAL at 08:19

## 2021-04-03 RX ADMIN — GUAIFENESIN 1200 MG: 600 TABLET, EXTENDED RELEASE ORAL at 21:19

## 2021-04-03 RX ADMIN — SENNOSIDES 1 TABLET: 8.6 TABLET, FILM COATED ORAL at 08:19

## 2021-04-03 RX ADMIN — HYDROCODONE BITARTRATE AND ACETAMINOPHEN 1 TABLET: 5; 325 TABLET ORAL at 14:42

## 2021-04-03 RX ADMIN — MUPIROCIN 1 APPLICATION: 20 OINTMENT TOPICAL at 08:19

## 2021-04-03 RX ADMIN — DOCUSATE SODIUM 100 MG: 100 CAPSULE, LIQUID FILLED ORAL at 08:18

## 2021-04-03 RX ADMIN — METOPROLOL TARTRATE 12.5 MG: 25 TABLET, FILM COATED ORAL at 08:18

## 2021-04-03 RX ADMIN — HYDROCODONE BITARTRATE AND ACETAMINOPHEN 1 TABLET: 5; 325 TABLET ORAL at 02:43

## 2021-04-03 RX ADMIN — CHLORHEXIDINE GLUCONATE 15 ML: 1.2 SOLUTION ORAL at 08:18

## 2021-04-03 RX ADMIN — GUAIFENESIN 1200 MG: 600 TABLET, EXTENDED RELEASE ORAL at 08:18

## 2021-04-03 RX ADMIN — HYDROCODONE BITARTRATE AND ACETAMINOPHEN 1 TABLET: 5; 325 TABLET ORAL at 22:23

## 2021-04-03 RX ADMIN — MUPIROCIN 1 APPLICATION: 20 OINTMENT TOPICAL at 21:20

## 2021-04-03 RX ADMIN — DOCUSATE SODIUM 100 MG: 100 CAPSULE, LIQUID FILLED ORAL at 21:19

## 2021-04-03 RX ADMIN — AMIODARONE HYDROCHLORIDE 200 MG: 200 TABLET ORAL at 10:08

## 2021-04-03 RX ADMIN — ATORVASTATIN CALCIUM 40 MG: 40 TABLET, FILM COATED ORAL at 21:19

## 2021-04-03 NOTE — PROGRESS NOTES
Status post CABG, postop day #3    CV stable.  Heart rate in the 90s.  I will add a low-dose amiodarone dose.  Continue beta-blocker.  Systolic blood pressure borderline therefore will not increase beta-blocker.  Remove AV wires.  Pulmonary toileting.  Ambulating well.  Diet as tolerated.  Nonoliguric.  Small daily dose of Lasix.  Afebrile.  No infectious disease issues.    Overall making reasonable progress.  I expect him to be discharged home in 24 to 48 hours.

## 2021-04-03 NOTE — PROGRESS NOTES
Cardiology    Patient Care Team:  David Vallejo Sr., MD as PCP - General        CHIEF COMPLAINT: Chest pain    ADMITTING DIAGNOSES: CAD    SUBJECTIVE: Status post CABG, postop day #3:  Heart rate in the 90s.  Per : will add a low-dose amiodarone dose.  Continue beta-blocker.  Systolic blood pressure borderline therefore will not increase beta-blocker    Review of Symptoms:  Constitutional: Patient afebrile no chills or unexpected weight changes  Respiratory: No cough, no wheezing or dyspnea  Cardiovascular: No chest pain, palpitations, dyspnea, orthopnea and no edema  Gastrointestinal: No nausea, vomiting, constipation or diarrhea.  No melena or dark stools    All other systems reviewed and are negative     PAST MEDICAL HISTORY:   Past Medical History:   Diagnosis Date   • Anxiety    • Depression    • Migraines    • Mild cognitive impairment        ALLERGIES:   Allergies   Allergen Reactions   • Shellfish Allergy Anaphylaxis         PAST SURGICAL HISTORY:   Past Surgical History:   Procedure Laterality Date   • CARDIAC CATHETERIZATION N/A 3/30/2021    Procedure: Left Heart Cath;  Surgeon: Félix Taylor MD;  Location: [facility] INVASIVE LOCATION;  Service: Cardiovascular;  Laterality: N/A;   • CHOLECYSTECTOMY     • FOOT SURGERY     • KNEE ARTHROSCOPY     • NOSE SURGERY            FAMILY HISTORY:   Family History   Problem Relation Age of Onset   • Heart attack Father    • Heart disease Father          SOCIAL HISTORY:   Social History     Socioeconomic History   • Marital status:      Spouse name: Not on file   • Number of children: Not on file   • Years of education: Not on file   • Highest education level: Not on file   Tobacco Use   • Smoking status: Never Smoker   • Smokeless tobacco: Never Used   Vaping Use   • Vaping Use: Never used   Substance and Sexual Activity   • Alcohol use: No   • Drug use: Never   • Sexual activity: Yes     Partners: Female       CURRENT MEDICATIONS:     Current  Facility-Administered Medications:   •  amiodarone (PACERONE) tablet 200 mg, 200 mg, Oral, Q24H, Axel Smyth MD, 200 mg at 04/03/21 1008  •  aspirin EC tablet 81 mg, 81 mg, Oral, Daily, Amina Will APRN, 81 mg at 04/03/21 0818  •  atorvastatin (LIPITOR) tablet 40 mg, 40 mg, Oral, Nightly, Amina Will APRN, 40 mg at 04/02/21 2020  •  chlorhexidine (PERIDEX) 0.12 % solution 15 mL, 15 mL, Mouth/Throat, Q12H, Amina Will APRN, 15 mL at 04/03/21 0818  •  clopidogrel (PLAVIX) tablet 75 mg, 75 mg, Oral, Daily, Mary Rasheed APRN, 75 mg at 04/03/21 0819  •  dextrose (D50W) 25 g/ 50mL Intravenous Solution 25 g, 25 g, Intravenous, Q15 Min PRN, Amina Will APRN  •  dextrose (GLUTOSE) oral gel 15 g, 15 g, Oral, Q15 Min PRN, Amina Will APRN  •  docusate sodium (COLACE) capsule 100 mg, 100 mg, Oral, BID, Amina Will APRN, 100 mg at 04/03/21 0818  •  enoxaparin (LOVENOX) syringe 40 mg, 40 mg, Subcutaneous, Daily, Amina Will APRN  •  furosemide (LASIX) tablet 40 mg, 40 mg, Oral, Daily, Mary Rasheed APRN, 40 mg at 04/03/21 0819  •  guaiFENesin (MUCINEX) 12 hr tablet 1,200 mg, 1,200 mg, Oral, Q12H, Mary Rasheed APRN, 1,200 mg at 04/03/21 0818  •  HYDROcodone-acetaminophen (NORCO) 5-325 MG per tablet 1 tablet, 1 tablet, Oral, Q4H PRN, Amina Will APRN, 1 tablet at 04/03/21 1008  •  insulin lispro (ADMELOG) injection 0-7 Units, 0-7 Units, Subcutaneous, TID AC **AND** insulin lispro (ADMELOG) injection 0-7 Units, 0-7 Units, Subcutaneous, PRN, Amina Will APRN  •  insulin regular 1 Units/mL in sodium chloride 0.9 % 100 mL infusion, 0-50 Units/hr, Intravenous, Continuous PRN, Amina Will APRN, Stopped at 04/02/21 2330  •  metoprolol tartrate (LOPRESSOR) half tablet 12.5 mg, 12.5 mg, Oral, Q12H, Mary Rasheed APRN, 12.5 mg at 04/03/21 0818  •  Mouth Kote solution 2 mL, 2 spray, Mouth/Throat, Q4H PRN, Amina Will APRN  •  mupirocin (BACTROBAN) 2 % nasal  ointment 1 application, 1 application, Each Nare, BID, Amina Will, APRN, 1 application at 04/03/21 0819  •  [DISCONTINUED] Morphine sulfate (PF) injection 2 mg, 2 mg, Intravenous, Q2H PRN, 2 mg at 04/02/21 2208 **AND** naloxone (NARCAN) injection 0.4 mg, 0.4 mg, Intravenous, Q5 Min PRN, Amina Will, APRN  •  ondansetron (ZOFRAN) injection 4 mg, 4 mg, Intravenous, Q6H PRN, Amina Will, APRN  •  pantoprazole (PROTONIX) EC tablet 40 mg, 40 mg, Oral, Q AM, Amina Will, DOLORES, 40 mg at 04/03/21 0621  •  polyethylene glycol (MIRALAX) packet 17 g, 17 g, Oral, Daily, Amina Will, APRN, 17 g at 04/03/21 0819  •  potassium chloride (K-DUR,KLOR-CON) CR tablet 20 mEq, 20 mEq, Oral, PRN **OR** potassium chloride (KLOR-CON) packet 20 mEq, 20 mEq, Oral, PRN, Amina Will, APRN  •  potassium chloride (K-DUR,KLOR-CON) CR tablet 20 mEq, 20 mEq, Oral, Daily, Wili, Mary, APRN, 20 mEq at 04/03/21 0819  •  potassium chloride 10 mEq in 100 mL IVPB, 10 mEq, Intravenous, Q1H PRN **OR** potassium chloride 10 mEq in 100 mL IVPB, 10 mEq, Intravenous, Q1H PRN, Amina Will, APRN, Last Rate: 200 mL/hr at 03/31/21 1837, 10 mEq at 03/31/21 1837  •  senna (SENOKOT) tablet 1 tablet, 1 tablet, Oral, BID, Amina Will, APRN, 1 tablet at 04/03/21 0819  •  sodium chloride 0.9 % infusion, 30 mL/hr, Intravenous, Continuous, Amina Will, DOLORES, Last Rate: 30 mL/hr at 04/02/21 0700, 30 mL/hr at 04/02/21 0700  •  sodium chloride nasal spray 2 spray, 2 spray, Each Nare, ENRIQUE, Axel Smyth MD      DIAGNOSTIC DATA:     I reviewed the patient's new clinical results.    Lab Results (last 24 hours)     Procedure Component Value Units Date/Time    Basic Metabolic Panel [[id number]]  (Abnormal) Collected: 04/03/21 0509    Specimen: Blood Updated: 04/03/21 0627     Glucose 103 mg/dL      BUN 22 mg/dL      Creatinine 0.78 mg/dL      Sodium 137 mmol/L      Potassium 3.9 mmol/L      Chloride 100 mmol/L      CO2 28.0  mmol/L      Calcium 8.8 mg/dL      eGFR Non African Amer 103 mL/min/1.73      BUN/Creatinine Ratio 28.2     Anion Gap 9.0 mmol/L     Narrative:      GFR Normal >60  Chronic Kidney Disease <60  Kidney Failure <15      CBC (No Diff) [[id number]]  (Abnormal) Collected: 04/03/21 0509    Specimen: Blood Updated: 04/03/21 0610     WBC 8.20 10*3/mm3      RBC 2.85 10*6/mm3      Hemoglobin 9.6 g/dL      Hematocrit 27.4 %      MCV 96.1 fL      MCH 33.8 pg      MCHC 35.1 g/dL      RDW 12.8 %      RDW-SD 42.4 fl      MPV 7.7 fL      Platelets 96 10*3/mm3     POC Glucose Once [[id number]]  (Abnormal) Collected: 04/01/21 2229    Specimen: Blood Updated: 04/02/21 2346     Glucose 123 mg/dL      Comment: Serial Number: [id number]:  [id number]       POC Glucose Once [[id number]]  (Normal) Collected: 04/02/21 2326    Specimen: Blood Updated: 04/02/21 2327     Glucose 101 mg/dL      Comment: Serial Number: [id number]:  [id number]       POC Glucose Once [[id number]]  (Abnormal) Collected: 04/02/21 2214    Specimen: Blood Updated: 04/02/21 2217     Glucose 118 mg/dL      Comment: Serial Number: [id number]:  [id number]       POC Glucose Once [[id number]]  (Abnormal) Collected: 04/02/21 2114    Specimen: Blood Updated: 04/02/21 2115     Glucose 132 mg/dL      Comment: Serial Number: [id number]:  [id number]       POC Glucose Once [[id number]]  (Abnormal) Collected: 04/02/21 2005    Specimen: Blood Updated: 04/02/21 2006     Glucose 121 mg/dL      Comment: Serial Number: [id number]:  [id number]       POC Glucose Once [[id number]]  (Normal) Collected: 04/02/21 1733    Specimen: Blood Updated: 04/02/21 1737     Glucose 104 mg/dL      Comment: Serial Number: [id number]:  [id number]       POC Glucose Once [[id number]]  (Abnormal) Collected: 04/02/21 1534    Specimen: Blood Updated: 04/02/21 1733     Glucose 110 mg/dL      Comment: Serial Number: [id number]:  [id number]             Imaging Results (Last 24  Hours)     Procedure Component Value Units Date/Time    XR Chest 1 View [[id number]] Collected: 04/03/21 0957     Updated: 04/03/21 1001    Narrative:      DATE OF EXAM:  4/3/2021 5:24 AM     PROCEDURE:  XR CHEST 1 VW-     INDICATIONS:  Left apical pneumothorax, coronary artery atherosclerotic vascular  disease, previous surgery.      COMPARISON:  04/02/2021 at 1314 hours     TECHNIQUE:   Single radiographic view of the chest was obtained.     FINDINGS:  There is still a small left apical pneumothorax measuring 1.8 cm from  the apex of the left lung to the apex of the chest wall. There is  abnormal consolidation, most extensive in the left upper lobe. This  could represent a combination of atelectasis or multifocal pneumonia.  There is a trace left pleural effusion. The right pleural space is  clear.  The heart size is normal with postsurgical changes noted.       Impression:         1. Small left apical pneumothorax measuring 1.8 cm.  2. Persistent consolidation in both lungs, especially in the left upper  lobe. This could be due to a combination of atelectasis or multifocal  pneumonia.  3. Stable trace left pleural effusion.     Electronically Signed By-Justin Miles MD On:4/3/2021 9:59 AM  This report was finalized on [id number] by  Justin Miles MD.          Xray reviewed personally by physician.      ECG reviewed personally by physician  ECG/EMG Results (most recent)     Procedure Component Value Units Date/Time    ECG 12 Lead [[id number]] Collected: 03/30/21 1540     Updated: 03/30/21 1541     QT Interval 289 ms     Narrative:      HEART RATE= 113  bpm  RR Interval= 528  ms  OH Interval= 168  ms  P Horizontal Axis= 42  deg  P Front Axis= 44  deg  QRSD Interval= 99  ms  QT Interval= 289  ms  QRS Axis= 32  deg  T Wave Axis= 193  deg  - ABNORMAL ECG -  Sinus tachycardia  Repol abnrm suggests ischemia, diffuse leads  When compared with ECG of 28-Feb-2021 16:15:34,  Significant rate increase  Significant  repolarization change  Electronically Signed By:   Date and Time of Study: 2021-03-30 15:40:38    ECG 12 Lead [[id number]] Collected: 03/31/21 1151     Updated: 03/31/21 1152     QT Interval 391 ms     Narrative:      HEART RATE= 71  bpm  RR Interval= 840  ms  ME Interval= 186  ms  P Horizontal Axis= 11  deg  P Front Axis= 12  deg  QRSD Interval= 123  ms  QT Interval= 391  ms  QRS Axis= 18  deg  T Wave Axis= 60  deg  - ABNORMAL ECG -  Sinus rhythm  Probable inferior infarct, recent  When compared with ECG of 30-Mar-2021 15:40:38,  Significant rate decrease  New or worsened ischemia or infarction  Significant repolarization change  Electronically Signed By:   Date and Time of Study: 2021-03-31 11:51:33    ECG 12 Lead [[id number]] Collected: 04/01/21 0326     Updated: 04/02/21 0621     QT Interval 337 ms     Narrative:      HEART RATE= 86  bpm  RR Interval= 696  ms  ME Interval= 143  ms  P Horizontal Axis= -12  deg  P Front Axis= 40  deg  QRSD Interval= 71  ms  QT Interval= 337  ms  QRS Axis= -7  deg  T Wave Axis= 17  deg  - ABNORMAL ECG -  Sinus rhythm  Inferior infarct, old  ST elevation, consider anterolateral injury  When compared with ECG of 31-Mar-2021 11:51:33,  Nonspecific significant change  Electronically Signed By: Thiago Anne ([facility]) 02-Apr-2021 06:20:44  Date and Time of Study: 2021-04-01 03:26:52    ECG 12 Lead [[id number]] Collected: 04/02/21 0315     Updated: 04/02/21 1722     QT Interval 361 ms     Narrative:      HEART RATE= 92  bpm  RR Interval= 652  ms  ME Interval= 133  ms  P Horizontal Axis= -14  deg  P Front Axis= 32  deg  QRSD Interval= 113  ms  QT Interval= 361  ms  QRS Axis= -8  deg  T Wave Axis= -5  deg  - ABNORMAL ECG -  Sinus rhythm  Probable left ventricular hypertrophy  Inferior infarct, age indeterminate  When compared with ECG of 01-Apr-2021 3:26:52,  Significant repolarization change  Significant axis, voltage or hypertrophy change  Electronically Signed By: Mona Perkins ([facility])  "02-Apr-2021 17:22:07  Date and Time of Study: 2021-04-02 03:15:13    ECG 12 Lead [[id number]] Collected: 04/03/21 0618     Updated: 04/03/21 0619     QT Interval 304 ms     Narrative:      HEART RATE= 90  bpm  RR Interval= 668  ms  AL Interval= 143  ms  P Horizontal Axis= -21  deg  P Front Axis= 37  deg  QRSD Interval= 82  ms  QT Interval= 304  ms  QRS Axis= 2  deg  T Wave Axis= -18  deg  - NORMAL ECG -  Sinus rhythm  Electronically Signed By:   Date and Time of Study: 2021-04-03 06:18:14              VITAL SIGNS: Temp:  [98.5 °F (36.9 °C)] 98.5 °F (36.9 °C)  Heart Rate:  [] 105  Resp:  [22] 22  BP: ()/(48-63) 138/56   Flowsheet Rows      First Filed Value   Admission Height  172 cm (67.7\") Documented at 03/30/2021 1016   Admission Weight  81.3 kg (179 lb 3.7 oz) Documented at 03/30/2021 1016        Physical exam  Constitutional: well-nourished, and appears stated age in no acute distress  PERRL: Conjunctiva clear, no pallor, anicteric  HENMT: normocephalic, normal dentition, no cyanosis or pallor  Neck:no bruits, or thrills and bilateral normal carotid upstroke. Normal jugular venous pressure  Cardiovascular: No parasternal heaves an non-displaced focal PMI. Normal rate and rhythm: no rub, gallop, murmur or click and normal S1 and S2; no lower or upper extremity edema.   Lungs: unlabored, no wheezing with no rales or rhonchi on auscultation.  Extremities: Warm, no clubbing, cyanosis, or edema. Full and equal peripheral pulses in extremities with no bruits appreciated.   Abdomen: soft, non-tender, non-distended  Musculoskeletal: no joint tenderness or swelling and no erythema  Skin: Warm and dry, non-erythematous   Neuro:alert and normal affect. Oriented to time, place and person.     ASSESSMENT AND PLAN:   Postop day 3 CABG  CAD  Hypertension  Dyslipidemia    See HPI for plan.  In addition we will continue postoperative aggressive care.      Jose Martin Pantoja MD  04/03/21  10:29 EDT          "

## 2021-04-03 NOTE — CONSULTS
Nutrition Services    Patient Name: Jean-Claude Clifford Jr.  YOB: 1965  MRN: [id number]  Admission date: 3/30/2021      PPE Documentation        PPE Worn By Provider Surgical mask, eye protection   PPE Worn By Patient  Patient not wearing PPE, spouse wearing mask     Nutrition Counseling & Education       Reason for Visit: Patient request/pt family request Patient sleeping during RD visit, diet ed conducted with patient's wife     Session topic: Heart Healthy Nutrition Education     Session comments: Previous education by RDN/LD and Provided nutrition education regarding post- CABG [facility] Healthy/TLC diet recommendations, increasing fruits/vegetables, whole grains. Resources provided for healthy recipes per spouse request      Provided print material via: [employer]-Nutrition Care Manual  and [facility] RDN created education material     Goals: Increase knowledge on diet/nutrition effects on condition/status      Monitoring/Follow Up: RD to follow up PRN         Electronically signed by:  Sudha Camacho RD  04/03/21 11:50 EDT

## 2021-04-04 ENCOUNTER — READMISSION MANAGEMENT (OUTPATIENT)
Dept: CALL CENTER | Facility: HOSPITAL | Age: 56
End: 2021-04-04

## 2021-04-04 VITALS
BODY MASS INDEX: 27.47 KG/M2 | SYSTOLIC BLOOD PRESSURE: 120 MMHG | DIASTOLIC BLOOD PRESSURE: 59 MMHG | TEMPERATURE: 98.7 F | HEART RATE: 94 BPM | WEIGHT: 181.22 LBS | OXYGEN SATURATION: 95 % | RESPIRATION RATE: 16 BRPM | HEIGHT: 68 IN

## 2021-04-04 LAB
ANION GAP SERPL CALCULATED.3IONS-SCNC: 11 MMOL/L (ref 5–15)
BUN SERPL-MCNC: 20 MG/DL (ref 6–20)
BUN/CREAT SERPL: 23.3 (ref 7–25)
CALCIUM SPEC-SCNC: 8.5 MG/DL (ref 8.6–10.5)
CHLORIDE SERPL-SCNC: 101 MMOL/L (ref 98–107)
CO2 SERPL-SCNC: 28 MMOL/L (ref 22–29)
CREAT SERPL-MCNC: 0.86 MG/DL (ref 0.76–1.27)
DEPRECATED RDW RBC AUTO: 43.8 FL (ref 37–54)
ERYTHROCYTE [DISTWIDTH] IN BLOOD BY AUTOMATED COUNT: 13.1 % (ref 12.3–15.4)
GFR SERPL CREATININE-BSD FRML MDRD: 92 ML/MIN/1.73
GLUCOSE BLDC GLUCOMTR-MCNC: 98 MG/DL (ref 70–105)
GLUCOSE SERPL-MCNC: 104 MG/DL (ref 65–99)
HCT VFR BLD AUTO: 27.5 % (ref 37.5–51)
HGB BLD-MCNC: 9.7 G/DL (ref 13–17.7)
MCH RBC QN AUTO: 33.5 PG (ref 26.6–33)
MCHC RBC AUTO-ENTMCNC: 35 G/DL (ref 31.5–35.7)
MCV RBC AUTO: 95.6 FL (ref 79–97)
PLATELET # BLD AUTO: 142 10*3/MM3 (ref 140–450)
PMV BLD AUTO: 7.1 FL (ref 6–12)
POTASSIUM SERPL-SCNC: 3.6 MMOL/L (ref 3.5–5.2)
QT INTERVAL: 304 MS
RBC # BLD AUTO: 2.88 10*6/MM3 (ref 4.14–5.8)
SODIUM SERPL-SCNC: 140 MMOL/L (ref 136–145)
WBC # BLD AUTO: 6.2 10*3/MM3 (ref 3.4–10.8)

## 2021-04-04 PROCEDURE — 80048 BASIC METABOLIC PNL TOTAL CA: CPT | Performed by: NURSE PRACTITIONER

## 2021-04-04 PROCEDURE — 85027 COMPLETE CBC AUTOMATED: CPT | Performed by: NURSE PRACTITIONER

## 2021-04-04 PROCEDURE — 99233 SBSQ HOSP IP/OBS HIGH 50: CPT | Performed by: INTERNAL MEDICINE

## 2021-04-04 PROCEDURE — 82962 GLUCOSE BLOOD TEST: CPT

## 2021-04-04 RX ORDER — CLOPIDOGREL BISULFATE 75 MG/1
75 TABLET ORAL DAILY
Qty: 30 TABLET | Refills: 5 | Status: SHIPPED | OUTPATIENT
Start: 2021-04-05 | End: 2022-04-18

## 2021-04-04 RX ORDER — HYDROCODONE BITARTRATE AND ACETAMINOPHEN 5; 325 MG/1; MG/1
1 TABLET ORAL EVERY 4 HOURS PRN
Qty: 36 TABLET | Refills: 0 | Status: SHIPPED | OUTPATIENT
Start: 2021-04-04 | End: 2021-04-10

## 2021-04-04 RX ORDER — ATORVASTATIN CALCIUM 40 MG/1
40 TABLET, FILM COATED ORAL NIGHTLY
Qty: 60 TABLET | Refills: 5 | Status: SHIPPED | OUTPATIENT
Start: 2021-04-04 | End: 2022-06-15

## 2021-04-04 RX ORDER — FUROSEMIDE 40 MG/1
40 TABLET ORAL DAILY
Qty: 30 TABLET | Refills: 0 | Status: SHIPPED | OUTPATIENT
Start: 2021-04-05 | End: 2021-05-07

## 2021-04-04 RX ORDER — POTASSIUM CHLORIDE 20 MEQ/1
20 TABLET, EXTENDED RELEASE ORAL DAILY
Qty: 30 TABLET | Refills: 0 | Status: SHIPPED | OUTPATIENT
Start: 2021-04-05 | End: 2021-05-07

## 2021-04-04 RX ADMIN — POTASSIUM CHLORIDE 20 MEQ: 1500 TABLET, EXTENDED RELEASE ORAL at 08:35

## 2021-04-04 RX ADMIN — FUROSEMIDE 40 MG: 40 TABLET ORAL at 08:35

## 2021-04-04 RX ADMIN — HYDROCODONE BITARTRATE AND ACETAMINOPHEN 1 TABLET: 5; 325 TABLET ORAL at 05:41

## 2021-04-04 RX ADMIN — ASPIRIN 81 MG: 81 TABLET, COATED ORAL at 08:35

## 2021-04-04 RX ADMIN — DOCUSATE SODIUM 100 MG: 100 CAPSULE, LIQUID FILLED ORAL at 08:35

## 2021-04-04 RX ADMIN — SENNOSIDES 1 TABLET: 8.6 TABLET, FILM COATED ORAL at 08:34

## 2021-04-04 RX ADMIN — MUPIROCIN 1 APPLICATION: 20 OINTMENT TOPICAL at 08:37

## 2021-04-04 RX ADMIN — HYDROCODONE BITARTRATE AND ACETAMINOPHEN 1 TABLET: 5; 325 TABLET ORAL at 11:38

## 2021-04-04 RX ADMIN — GUAIFENESIN 1200 MG: 600 TABLET, EXTENDED RELEASE ORAL at 08:34

## 2021-04-04 RX ADMIN — CLOPIDOGREL BISULFATE 75 MG: 75 TABLET ORAL at 08:35

## 2021-04-04 RX ADMIN — PANTOPRAZOLE SODIUM 40 MG: 40 TABLET, DELAYED RELEASE ORAL at 08:35

## 2021-04-04 RX ADMIN — METOPROLOL TARTRATE 12.5 MG: 25 TABLET, FILM COATED ORAL at 08:35

## 2021-04-04 RX ADMIN — CHLORHEXIDINE GLUCONATE 15 ML: 1.2 SOLUTION ORAL at 08:37

## 2021-04-04 RX ADMIN — AMIODARONE HYDROCHLORIDE 200 MG: 200 TABLET ORAL at 08:37

## 2021-04-04 NOTE — SIGNIFICANT NOTE
04/04/21 1227   Discharge of Care   Discharge Mode wheel chair   Discharged Accompanied by spouse   Discharge Teaching Done  Yes   Learning Method Explanation;Teach Back;Written Materials

## 2021-04-04 NOTE — PROGRESS NOTES
Cardiology    Patient Care Team:  David Vallejo Sr., MD as PCP - General        CHIEF COMPLAINT: Chest pain    ADMITTING DIAGNOSES: CAD    SUBJECTIVE: Status post CABG, postop day #4:   Doing well today.  May be able to be discharged.    Review of Symptoms are unchanged:  Constitutional: Patient afebrile no chills or unexpected weight changes  Respiratory: No cough, no wheezing or dyspnea  Cardiovascular: No chest pain, palpitations, dyspnea, orthopnea and no edema  Gastrointestinal: No nausea, vomiting, constipation or diarrhea.  No melena or dark stools    All other systems reviewed and are negative     PAST MEDICAL HISTORY:   Past Medical History:   Diagnosis Date   • Anxiety    • Depression    • Migraines    • Mild cognitive impairment        ALLERGIES:   Allergies   Allergen Reactions   • Shellfish Allergy Anaphylaxis         PAST SURGICAL HISTORY:   Past Surgical History:   Procedure Laterality Date   • CARDIAC CATHETERIZATION N/A 3/30/2021    Procedure: Left Heart Cath;  Surgeon: Félix Taylor MD;  Location: [facility] CATH INVASIVE LOCATION;  Service: Cardiovascular;  Laterality: N/A;   • CHOLECYSTECTOMY     • FOOT SURGERY     • KNEE ARTHROSCOPY     • NOSE SURGERY            FAMILY HISTORY:   Family History   Problem Relation Age of Onset   • Heart attack Father    • Heart disease Father          SOCIAL HISTORY:   Social History     Socioeconomic History   • Marital status:      Spouse name: Not on file   • Number of children: Not on file   • Years of education: Not on file   • Highest education level: Not on file   Tobacco Use   • Smoking status: Never Smoker   • Smokeless tobacco: Never Used   Vaping Use   • Vaping Use: Never used   Substance and Sexual Activity   • Alcohol use: No   • Drug use: Never   • Sexual activity: Yes     Partners: Female       CURRENT MEDICATIONS:     Current Facility-Administered Medications:   •  amiodarone (PACERONE) tablet 200 mg, 200 mg, Oral, Q24H, Axel Smyth MD,  200 mg at 04/04/21 0837  •  aspirin EC tablet 81 mg, 81 mg, Oral, Daily, Amina Will APRN, 81 mg at 04/04/21 0835  •  atorvastatin (LIPITOR) tablet 40 mg, 40 mg, Oral, Nightly, Amina Will APRN, 40 mg at 04/03/21 2119  •  chlorhexidine (PERIDEX) 0.12 % solution 15 mL, 15 mL, Mouth/Throat, Q12H, Amina Will APRN, 15 mL at 04/04/21 0837  •  clopidogrel (PLAVIX) tablet 75 mg, 75 mg, Oral, Daily, Mary Rasheed APRN, 75 mg at 04/04/21 0835  •  dextrose (D50W) 25 g/ 50mL Intravenous Solution 25 g, 25 g, Intravenous, Q15 Min PRN, Amina Will APRN  •  dextrose (GLUTOSE) oral gel 15 g, 15 g, Oral, Q15 Min PRN, Amina Will APRN  •  docusate sodium (COLACE) capsule 100 mg, 100 mg, Oral, BID, Amina Will APRN, 100 mg at 04/04/21 0835  •  enoxaparin (LOVENOX) syringe 40 mg, 40 mg, Subcutaneous, Daily, Axel Smyth MD  •  furosemide (LASIX) tablet 40 mg, 40 mg, Oral, Daily, Mary Rasheed APRN, 40 mg at 04/04/21 0835  •  guaiFENesin (MUCINEX) 12 hr tablet 1,200 mg, 1,200 mg, Oral, Q12H, Mary Rasheed APRN, 1,200 mg at 04/04/21 0834  •  HYDROcodone-acetaminophen (NORCO) 5-325 MG per tablet 1 tablet, 1 tablet, Oral, Q4H PRN, Amina Will APRN, 1 tablet at 04/04/21 0541  •  insulin lispro (ADMELOG) injection 0-7 Units, 0-7 Units, Subcutaneous, TID AC **AND** insulin lispro (ADMELOG) injection 0-7 Units, 0-7 Units, Subcutaneous, PRN, Amina Will APRN  •  metoprolol tartrate (LOPRESSOR) half tablet 12.5 mg, 12.5 mg, Oral, Q12H, Mary Rasheed APRN, 12.5 mg at 04/04/21 0835  •  Mouth Kote solution 2 mL, 2 spray, Mouth/Throat, Q4H PRN, Amina Will APRN  •  mupirocin (BACTROBAN) 2 % nasal ointment 1 application, 1 application, Each Nare, BID, Amina Will APRN, 1 application at 04/04/21 0837  •  [DISCONTINUED] Morphine sulfate (PF) injection 2 mg, 2 mg, Intravenous, Q2H PRN, 2 mg at 04/02/21 2208 **AND** naloxone (NARCAN) injection 0.4 mg, 0.4 mg, Intravenous, Q5 Min  PRN, Amina Will APRN  •  ondansetron (ZOFRAN) injection 4 mg, 4 mg, Intravenous, Q6H PRN, Amina Will APRN  •  pantoprazole (PROTONIX) EC tablet 40 mg, 40 mg, Oral, Q AM, Amina Will APRN, 40 mg at 04/04/21 0835  •  polyethylene glycol (MIRALAX) packet 17 g, 17 g, Oral, Daily, Amina Will APRN, 17 g at 04/03/21 0819  •  potassium chloride (K-DUR,KLOR-CON) CR tablet 20 mEq, 20 mEq, Oral, PRN **OR** potassium chloride (KLOR-CON) packet 20 mEq, 20 mEq, Oral, PRN, Amina Will APRN  •  potassium chloride (K-DUR,KLOR-CON) CR tablet 20 mEq, 20 mEq, Oral, Daily, Mary Rasheed, APRN, 20 mEq at 04/04/21 0835  •  potassium chloride 10 mEq in 100 mL IVPB, 10 mEq, Intravenous, Q1H PRN **OR** potassium chloride 10 mEq in 100 mL IVPB, 10 mEq, Intravenous, Q1H PRN, Amina Will APRN, Last Rate: 200 mL/hr at 03/31/21 1837, 10 mEq at 03/31/21 1837  •  senna (SENOKOT) tablet 1 tablet, 1 tablet, Oral, BID, Amina Will APRN, 1 tablet at 04/04/21 0834  •  sodium chloride 0.9 % infusion, 30 mL/hr, Intravenous, Continuous, Amnia Will APRN, Last Rate: 30 mL/hr at 04/02/21 0700, 30 mL/hr at 04/02/21 0700  •  sodium chloride nasal spray 2 spray, 2 spray, Each Nare, PRN, Axel Smyth MD      DIAGNOSTIC DATA:     I reviewed the patient's new clinical results.    Lab Results (last 24 hours)     Procedure Component Value Units Date/Time    POC Glucose Once [[id number]]  (Normal) Collected: 04/04/21 0750    Specimen: Blood Updated: 04/04/21 0754     Glucose 98 mg/dL      Comment: Serial Number: [id number]:  [id number]       Basic Metabolic Panel [[id number]]  (Abnormal) Collected: 04/04/21 0303    Specimen: Blood Updated: 04/04/21 0357     Glucose 104 mg/dL      BUN 20 mg/dL      Creatinine 0.86 mg/dL      Sodium 140 mmol/L      Potassium 3.6 mmol/L      Chloride 101 mmol/L      CO2 28.0 mmol/L      Calcium 8.5 mg/dL      eGFR Non African Amer 92 mL/min/1.73      BUN/Creatinine Ratio 23.3      Anion Gap 11.0 mmol/L     Narrative:      GFR Normal >60  Chronic Kidney Disease <60  Kidney Failure <15      CBC (No Diff) [[id number]]  (Abnormal) Collected: 04/04/21 0303    Specimen: Blood Updated: 04/04/21 0333     WBC 6.20 10*3/mm3      RBC 2.88 10*6/mm3      Hemoglobin 9.7 g/dL      Hematocrit 27.5 %      MCV 95.6 fL      MCH 33.5 pg      MCHC 35.0 g/dL      RDW 13.1 %      RDW-SD 43.8 fl      MPV 7.1 fL      Platelets 142 10*3/mm3     POC Glucose Once [[id number]]  (Abnormal) Collected: 04/03/21 1713    Specimen: Blood Updated: 04/03/21 1715     Glucose 119 mg/dL      Comment: Serial Number: [id number]:  [id number]             Imaging Results (Last 24 Hours)     ** No results found for the last 24 hours. **          Xray reviewed personally by physician.      ECG reviewed personally by physician  ECG/EMG Results (most recent)     Procedure Component Value Units Date/Time    ECG 12 Lead [[id number]] Collected: 03/30/21 1540     Updated: 03/30/21 1541     QT Interval 289 ms     Narrative:      HEART RATE= 113  bpm  RR Interval= 528  ms  MO Interval= 168  ms  P Horizontal Axis= 42  deg  P Front Axis= 44  deg  QRSD Interval= 99  ms  QT Interval= 289  ms  QRS Axis= 32  deg  T Wave Axis= 193  deg  - ABNORMAL ECG -  Sinus tachycardia  Repol abnrm suggests ischemia, diffuse leads  When compared with ECG of 28-Feb-2021 16:15:34,  Significant rate increase  Significant repolarization change  Electronically Signed By:   Date and Time of Study: 2021-03-30 15:40:38    ECG 12 Lead [[id number]] Collected: 03/31/21 1151     Updated: 03/31/21 1152     QT Interval 391 ms     Narrative:      HEART RATE= 71  bpm  RR Interval= 840  ms  MO Interval= 186  ms  P Horizontal Axis= 11  deg  P Front Axis= 12  deg  QRSD Interval= 123  ms  QT Interval= 391  ms  QRS Axis= 18  deg  T Wave Axis= 60  deg  - ABNORMAL ECG -  Sinus rhythm  Probable inferior infarct, recent  When compared with ECG of 30-Mar-2021 15:40:38,  Significant rate  decrease  New or worsened ischemia or infarction  Significant repolarization change  Electronically Signed By:   Date and Time of Study: 2021-03-31 11:51:33    ECG 12 Lead [[id number]] Collected: 04/01/21 0326     Updated: 04/02/21 0621     QT Interval 337 ms     Narrative:      HEART RATE= 86  bpm  RR Interval= 696  ms  UT Interval= 143  ms  P Horizontal Axis= -12  deg  P Front Axis= 40  deg  QRSD Interval= 71  ms  QT Interval= 337  ms  QRS Axis= -7  deg  T Wave Axis= 17  deg  - ABNORMAL ECG -  Sinus rhythm  Inferior infarct, old  ST elevation, consider anterolateral injury  When compared with ECG of 31-Mar-2021 11:51:33,  Nonspecific significant change  Electronically Signed By: Thiago Anne ([facility]) 02-Apr-2021 06:20:44  Date and Time of Study: 2021-04-01 03:26:52    ECG 12 Lead [[id number]] Collected: 04/02/21 0315     Updated: 04/02/21 1722     QT Interval 361 ms     Narrative:      HEART RATE= 92  bpm  RR Interval= 652  ms  UT Interval= 133  ms  P Horizontal Axis= -14  deg  P Front Axis= 32  deg  QRSD Interval= 113  ms  QT Interval= 361  ms  QRS Axis= -8  deg  T Wave Axis= -5  deg  - ABNORMAL ECG -  Sinus rhythm  Probable left ventricular hypertrophy  Inferior infarct, age indeterminate  When compared with ECG of 01-Apr-2021 3:26:52,  Significant repolarization change  Significant axis, voltage or hypertrophy change  Electronically Signed By: Mona Perkins ([facility]) 02-Apr-2021 17:22:07  Date and Time of Study: 2021-04-02 03:15:13    ECG 12 Lead [[id number]] Collected: 04/03/21 0618     Updated: 04/04/21 0550     QT Interval 304 ms     Narrative:      HEART RATE= 90  bpm  RR Interval= 668  ms  UT Interval= 143  ms  P Horizontal Axis= -21  deg  P Front Axis= 37  deg  QRSD Interval= 82  ms  QT Interval= 304  ms  QRS Axis= 2  deg  T Wave Axis= -18  deg  - NORMAL ECG -  Sinus rhythm  When compared with ECG of 02-Apr-2021 3:15:13,  Significant axis, voltage or hypertrophy change  Electronically Signed By: Kit  "Jose Martin ([facility]) 04-Apr-2021 05:50:02  Date and Time of Study: 2021-04-03 06:18:14              VITAL SIGNS: Temp:  [97.5 °F (36.4 °C)-98.3 °F (36.8 °C)] 98.3 °F (36.8 °C)  Heart Rate:  [] 94  Resp:  [16-20] 16  BP: ()/(32-77) 120/59   Flowsheet Rows      First Filed Value   Admission Height  172 cm (67.7\") Documented at 03/30/2021 1016   Admission Weight  81.3 kg (179 lb 3.7 oz) Documented at 03/30/2021 1016        Physical exam is unchanged  Constitutional: well-nourished, and appears stated age in no acute distress  PERRL: Conjunctiva clear, no pallor, anicteric  HENMT: normocephalic, normal dentition, no cyanosis or pallor  Neck:no bruits, or thrills and bilateral normal carotid upstroke. Normal jugular venous pressure  Cardiovascular: No parasternal heaves an non-displaced focal PMI. Normal rate and rhythm: no rub, gallop, murmur or click and normal S1 and S2; no lower or upper extremity edema.   Lungs: unlabored, no wheezing with no rales or rhonchi on auscultation.  Extremities: Warm, no clubbing, cyanosis, or edema. Full and equal peripheral pulses in extremities with no bruits appreciated.   Abdomen: soft, non-tender, non-distended  Musculoskeletal: no joint tenderness or swelling and no erythema  Skin: Warm and dry, non-erythematous   Neuro:alert and normal affect. Oriented to time, place and person.     ASSESSMENT AND PLAN:   Postop day 4 CABG  CAD  Hypertension  Dyslipidemia    Continue postop care.  Patient is improving clinically.      Jose Martin Pantoja MD  04/04/21  09:26 EDT          "

## 2021-04-04 NOTE — DISCHARGE SUMMARY
Date of Admission:   Date of Discharge:  4/4/2021    Discharge Diagnosis: Severe CAD    Presenting Problem/History of Present Illness  Chest pain due to myocardial ischemia, unspecified ischemic chest pain type [I25.9]     Hospital Course  Patient is a 55 y.o. male presented with Severe CAD.  He had CABG and did well.    Procedures Performed  Procedure(s):  CORONARY ARTERY BYPASS GRAFTING    Urgent CABG x4 with LIMA to distal LAD, SV to OM1, SV seq to PDA and PLVB, LIMA to distal LAD. LLE EVH. Intraoperative transesophageal echocardiogram. PRP application to LIMA bed and sternum.    Consults:   Consults     No orders found from 3/1/2021 to 3/31/2021.          Pertinent Test Results:    Lab Results   Component Value Date    WBC 6.20 04/04/2021    HGB 9.7 (L) 04/04/2021    HCT 27.5 (L) 04/04/2021    MCV 95.6 04/04/2021     04/04/2021      Lab Results   Component Value Date    GLUCOSE 104 (H) 04/04/2021    CALCIUM 8.5 (L) 04/04/2021     04/04/2021    K 3.6 04/04/2021    CO2 28.0 04/04/2021     04/04/2021    BUN 20 04/04/2021    CREATININE 0.86 04/04/2021    EGFRIFNONA 92 04/04/2021    BCR 23.3 04/04/2021    ANIONGAP 11.0 04/04/2021     Lab Results   Component Value Date    INR 1.04 04/01/2021    PROTIME 11.4 04/01/2021         Condition on Discharge:  good    Vital Signs  Temp:  [98 °F (36.7 °C)-98.7 °F (37.1 °C)] 98.7 °F (37.1 °C)  Heart Rate:  [] 94  Resp:  [16-20] 16  BP: (119-134)/(32-64) 120/59      Discharge Disposition  Home or Self Care    Discharge Medications     Discharge Medications      New Medications      Instructions Start Date   atorvastatin 40 MG tablet  Commonly known as: LIPITOR   40 mg, Oral, Nightly      clopidogrel 75 MG tablet  Commonly known as: PLAVIX   75 mg, Oral, Daily   Start Date: April 5, 2021     furosemide 40 MG tablet  Commonly known as: LASIX   40 mg, Oral, Daily   Start Date: April 5, 2021     HYDROcodone-acetaminophen 5-325 MG per tablet  Commonly known as:  NORCO   1 tablet, Oral, Every 4 Hours PRN      metoprolol tartrate 25 MG tablet  Commonly known as: LOPRESSOR   12.5 mg, Oral, Every 12 Hours Scheduled      potassium chloride 20 MEQ CR tablet  Commonly known as: K-DUR,KLOR-CON   20 mEq, Oral, Daily   Start Date: April 5, 2021        Continue These Medications      Instructions Start Date   acetaminophen 500 MG tablet  Commonly known as: TYLENOL   500 mg, Oral, Every 6 Hours PRN      aspirin 325 MG tablet   81 mg, Oral, Daily      Hydrocort-Pramoxine (Perianal) 2.5-1 % rectal cream  Commonly known as: ANALPRAM-HC   Rectal, 3 Times Daily, 2-4 times daily, as needed or after each Bowel Movement      multivitamin with minerals tablet tablet   1 tablet, Oral, Daily         Stop These Medications    isosorbide mononitrate 30 MG 24 hr tablet  Commonly known as: IMDUR            Discharge Diet:     Activity at Discharge:     Follow-up Appointments  Future Appointments   Date Time Provider Department Center   4/30/2021  9:00 AM Félix Taylor MD [facility] None     Additional Instructions for the Follow-ups that You Need to Schedule     Ambulatory Referral to Home Health   As directed      Face to Face Visit Date: 4/1/2021    Follow-up provider for Plan of Care?: I treated the patient in an acute care facility and will not continue treatment after discharge.    Follow-up provider: UCHE REYNOLDS SR [[id number]]    Reason/Clinical Findings: post hospital admission    Describe mobility limitations that make leaving home difficult: weakness    Nursing/Therapeutic Services Requested: Other ([facility] to [facility] )    Frequency: 1 Week 1         Ambulatory Referral to Home Health   As directed      Face to Face Visit Date: 4/2/2021    Follow-up provider for Plan of Care?: I treated the patient in an acute care facility and will not continue treatment after discharge.    Follow-up provider: UCHE REYNOLDS SR [[id number]]    Reason/Clinical Findings: post hospital admission    Describe mobility  limitations that make leaving home difficult: weakness    Nursing/Therapeutic Services Requested: Other (HHC to eval )    Frequency: 1 Week 1         Ambulatory Referral to Home Health   As directed      Face to Face Visit Date: 4/4/2021    Follow-up provider for Plan of Care?: I will be treating the patient on an ongoing basis.  Please send me the Plan of Care for signature.    Follow-up provider: KENNEDY ANGULO [[id number]]    Reason/Clinical Findings: Status post CABG    Describe mobility limitations that make leaving home difficult: Status post CABG    Nursing/Therapeutic Services Requested: Skilled Nursing Physical Therapy    Skilled nursing orders: Post CABG care Medication education Cardiopulmonary assessments    PT orders: Therapeutic exercise Home safety assessment Strengthening    Frequency: 1 Week 1         Discharge Follow-up with PCP   As directed       Currently Documented PCP:    David Vallejo Sr., MD    PCP Phone Number:    [phone]     Follow Up Details: Follow Up With PCP Within 7 Days if Not Able to Return to [facility] for Appointment Within 7 Days         Discharge Follow-up with Specialty: Cardiology; 2 Weeks   As directed      Specialty: Cardiology    Follow Up: 2 Weeks         Discharge Follow-up with Specialty: Cardiothoracic Surgery   As directed      Follow Up Details: Follow Up in 2-4 Weeks    Specialty: Cardiothoracic Surgery         Cardiac Rehab Evaluation and Enrollment   Apr 09, 2021      Reason for Consult?: Education               Test Results Pending at Discharge       Sravan Bolivar MD  04/04/21  17:26 EDT

## 2021-04-04 NOTE — OUTREACH NOTE
Prep Survey      Responses   [facility] patient discharged from?  [facility]   Is LACE score < 7 ?  No   Emergency Room discharge w/ pulse ox?  No   Eligibility  TCM   Hospital  [facility]   Date of Admission  03/30/21   Date of Discharge  04/04/21   Discharge Disposition  Home or Self Care   Discharge diagnosis  CAD, CABG x 4    Does the patient have one of the following disease processes/diagnoses(primary or secondary)?  Cardiothoracic surgery   Does the patient have Home health ordered?  No   Is there a DME ordered?  No   Prep survey completed?  Yes          Irena Soto RN

## 2021-04-04 NOTE — PROGRESS NOTES
Discharge Planning Assessment   [facility]     Patient Name: Jean-Claude Clifford Jr.  MRN: [id number]  Today's Date: 4/4/2021    Admit Date: 3/30/2021      Discharge Plan     Row Name 04/04/21 1119       Plan    Plan  DC plan: home with family. Denies HHC services and DME needs.    Plan Comments  S/W pt's wife at bedside. Requesting shower chair. Discussed that shower bench is not covered by insurance but may be purchased. Offered 3-in-1 BSC. Wife states that it will not fit in bathtub. Pt's wife declining BSC. States that she will purchase a shower bench from [employer] that she previously researched. Discussed PT suggestion for HHC. Pt and family continue to decline need for HHC. No additional needs at this time. Met with pt and family in room for less than 15 minutes with appropratie PPE including mask and goggles.        Continued Care and Services - Admitted Since 3/30/2021     Home Medical Care     Service Provider Request Status Selected Services Address Phone Fax Patient Preferred    [facility] [place]  Declined  Insurance Denial N/A [address] [place] [address] [phone] --              Expected Discharge Date and Time     Expected Discharge Date Expected Discharge Time    Apr 4, 2021           Lara Posadas RN

## 2021-04-04 NOTE — PLAN OF CARE
Goal Outcome Evaluation: pt progressing well pain more controlled tonight . Still somewhat tachycardic when ambulating but b/p and O2 sats are tolerating activity. Pt noted to desat  to 84% -87% when sound asleep and had to place O2 on at 2 liters nasal cannula on around 0230 this am. Since placed pn O2 sats have remained > 94%. Slept well

## 2021-04-05 ENCOUNTER — TRANSITIONAL CARE MANAGEMENT TELEPHONE ENCOUNTER (OUTPATIENT)
Dept: CALL CENTER | Facility: HOSPITAL | Age: 56
End: 2021-04-05

## 2021-04-05 NOTE — OUTREACH NOTE
Call Center TCM Note      Responses   [facility] patient discharged from?  [facility]   Does the patient have one of the following disease processes/diagnoses(primary or secondary)?  Cardiothoracic surgery   TCM attempt successful?  Yes   Discharge diagnosis  CAD, CABG x 4    Meds reviewed with patient/caregiver?  Yes   Is the patient having any side effects they believe may be caused by any medication additions or changes?  No   Does the patient have all medications related to this admission filled (includes all antibiotics, pain medications, cardiac medications, etc.)  Yes   Is the patient taking all medications as directed (includes completed medication regime)?  Yes   Does the patient have a primary care provider?   Yes   Does the patient have an appointment scheduled with their C/T surgeon?  Yes   Comments regarding PCP  Pt declines TCM FWP for now, he will call to sched    Has the patient kept scheduled appointments due by today?  N/A   Has home health visited the patient within 72 hours of discharge?  N/A   Psychosocial issues?  No   Did the patient receive a copy of their discharge instructions?  Yes   Nursing interventions  Reviewed instructions with patient   What is the patient's perception of their health status since discharge?  Improving   Nursing interventions  Nurse provided patient education   Is the patient /caregiver able to teach back basic post-op care?  Continue use of incentive spirometry at least 1 week post discharge, Take showers only when approved by MD-sponge bathe until then, Do not remove steri-strips, Lifting as instructed by MD in discharge instructions, No tub bath, swimming, or hot tub until instructed by MD, Practice 'cough and deep breath', Drive as instructed by MD in discharge instructions, Keep incision areas clean, dry and protected   Is the patient/caregiver able to teach back signs and symptoms of incisional infection?  Increased redness, swelling or pain at the incisonal  site, Pus or odor from incision, Fever, Increased drainage or bleeding, Incisional warmth   Is the patient/caregiver able to teach back steps to recovery at home?  Set small, achievable goals for return to baseline health, Practice good oral hygiene, Eat a well-balance diet, Rest and rebuild strength, gradually increase activity, Weigh daily, Make a list of questions for surgeon's appointment   Is the patient /caregiver able to teach back the importance of cardiac rehab?  Yes   Nursing interventions  Provided education on importance of cardiac rehab   If the patient is a current smoker, are they able to teach back resources for cessation?  Not a smoker   Is the patient/caregiver able to teach back the hierarchy of who to call/visit for symptoms/problems? PCP, Specialist, Home health nurse, Urgent Care, ED, 911  Yes   TCM call completed?  Yes   Wrap up additional comments  Pt feeling really well s/p emergent CABG. No chest pain, SOB, appetite good. Incision site good. Pt taking minimal pain meds. All new meds in place. Pt calling for C/T fwp and will see Cardio MD on 04/30/2021. Pt declines TCM FWP for now, he will call to charito Pantoja MA    4/5/2021, 14:43 EDT

## 2021-04-05 NOTE — PROGRESS NOTES
Case Management Discharge Note      Final Note: Declned home health.    Provided Post Acute Provider List?: Yes  Post Acute Provider List: Home Health  Provided Post Acute Provider Quality & Resource List?: N/A  Delivered To: Support Person  Method of Delivery: In person                                   Final Discharge Disposition Code: 01 - home or self-care

## 2021-04-05 NOTE — PAYOR COMM NOTE
"This is discharge notice for Myrna Kingston Jr.  auth# PENDING  Pt discharged routine to home on 4/4/21.    AUTHORIZATION PENDING: THIS CASE WAS SUBMITTED VIA FAX ON 3/31/21 @ 1119 AND IS STILL PENDING.    PLEASE CALL OR FAX DETERMINATION TO CONTACT BELOW. THANK YOU.     VARSHA MONROY RN  UTILIZATION REVIEW  [facility]  PH: [phone]  FX: [phone]      Myrna Kingston Jr. (55 y.o. Male)     Date of Birth Social Security Number Address Home Phone MRN    1965  [facility] [place] [address] [phone] [id number]    Nondenominational Marital Status          Religion        Admission Date Admission Type Admitting Provider Attending Provider Department, Room/Bed    3/30/21 Elective Axel Smyth MD  [facility] CARDIOVASCULAR CARE UNIT, 2205/1    Discharge Date Discharge Disposition Discharge Destination        4/4/2021 Home or Self Care              Attending Provider: (none)   Allergies: Shellfish Allergy    Isolation: None   Infection: None   Code Status: Prior    Ht: 172.7 cm (68\")   Wt: 82.2 kg (181 lb 3.5 oz)    Admission Cmt: None   Principal Problem: Chest pain due to myocardial ischemia [I25.9] More...                 Active Insurance as of 3/30/2021     Primary Coverage     Payor Plan Insurance Group Employer/Plan Group    MISC COMMERCIAL MISC COMMERCIAL [id number]     Coverage Address Coverage Phone Number Coverage Fax Number Effective Dates    [address] [address] [phone]  12/1/2017 - None Entered    [facility] [id number]       Subscriber Name Subscriber Birth Date Member ID       VASHTIMYRNA LOERA JR. 1965 [id number]                 Emergency Contacts      (Rel.) Home Phone Work Phone Mobile Phone    JAYY KINGSTON (Spouse) -- -- [phone]               Discharge Summary      Jr rSavan Bolivar MD at 04/04/21 1215          Date of Admission:   Date of Discharge:  4/4/2021    Discharge Diagnosis: Severe " CAD    Presenting Problem/History of Present Illness  Chest pain due to myocardial ischemia, unspecified ischemic chest pain type [I25.9]     Hospital Course  Patient is a 55 y.o. male presented with Severe CAD.  He had CABG and did well.    Procedures Performed  Procedure(s):  CORONARY ARTERY BYPASS GRAFTING    Urgent CABG x4 with LIMA to distal LAD, SV to OM1, SV seq to PDA and PLVB, LIMA to distal LAD. LLE EVH. Intraoperative transesophageal echocardiogram. PRP application to LIMA bed and sternum.    Consults:   Consults     No orders found from 3/1/2021 to 3/31/2021.          Pertinent Test Results:    Lab Results   Component Value Date    WBC 6.20 04/04/2021    HGB 9.7 (L) 04/04/2021    HCT 27.5 (L) 04/04/2021    MCV 95.6 04/04/2021     04/04/2021      Lab Results   Component Value Date    GLUCOSE 104 (H) 04/04/2021    CALCIUM 8.5 (L) 04/04/2021     04/04/2021    K 3.6 04/04/2021    CO2 28.0 04/04/2021     04/04/2021    BUN 20 04/04/2021    CREATININE 0.86 04/04/2021    EGFRIFNONA 92 04/04/2021    BCR 23.3 04/04/2021    ANIONGAP 11.0 04/04/2021     Lab Results   Component Value Date    INR 1.04 04/01/2021    PROTIME 11.4 04/01/2021         Condition on Discharge:  good    Vital Signs  Temp:  [98 °F (36.7 °C)-98.7 °F (37.1 °C)] 98.7 °F (37.1 °C)  Heart Rate:  [] 94  Resp:  [16-20] 16  BP: (119-134)/(32-64) 120/59      Discharge Disposition  Home or Self Care    Discharge Medications     Discharge Medications      New Medications      Instructions Start Date   atorvastatin 40 MG tablet  Commonly known as: LIPITOR   40 mg, Oral, Nightly      clopidogrel 75 MG tablet  Commonly known as: PLAVIX   75 mg, Oral, Daily   Start Date: April 5, 2021     furosemide 40 MG tablet  Commonly known as: LASIX   40 mg, Oral, Daily   Start Date: April 5, 2021     HYDROcodone-acetaminophen 5-325 MG per tablet  Commonly known as: NORCO   1 tablet, Oral, Every 4 Hours PRN      metoprolol tartrate 25 MG  tablet  Commonly known as: LOPRESSOR   12.5 mg, Oral, Every 12 Hours Scheduled      potassium chloride 20 MEQ CR tablet  Commonly known as: K-DUR,KLOR-CON   20 mEq, Oral, Daily   Start Date: April 5, 2021        Continue These Medications      Instructions Start Date   acetaminophen 500 MG tablet  Commonly known as: TYLENOL   500 mg, Oral, Every 6 Hours PRN      aspirin 325 MG tablet   81 mg, Oral, Daily      Hydrocort-Pramoxine (Perianal) 2.5-1 % rectal cream  Commonly known as: ANALPRAM-HC   Rectal, 3 Times Daily, 2-4 times daily, as needed or after each Bowel Movement      multivitamin with minerals tablet tablet   1 tablet, Oral, Daily         Stop These Medications    isosorbide mononitrate 30 MG 24 hr tablet  Commonly known as: IMDUR            Discharge Diet:     Activity at Discharge:     Follow-up Appointments  Future Appointments   Date Time Provider Department Center   4/30/2021  9:00 AM Félix Taylor MD [facility] None     Additional Instructions for the Follow-ups that You Need to Schedule     Ambulatory Referral to Home Health   As directed      Face to Face Visit Date: 4/1/2021    Follow-up provider for Plan of Care?: I treated the patient in an acute care facility and will not continue treatment after discharge.    Follow-up provider: UCHE REYNOLDS SR [[id number]]    Reason/Clinical Findings: post hospital admission    Describe mobility limitations that make leaving home difficult: weakness    Nursing/Therapeutic Services Requested: Other ([facility] [place] to eval )    Frequency: 1 Week 1         Ambulatory Referral to Home Health   As directed      Face to Face Visit Date: 4/2/2021    Follow-up provider for Plan of Care?: I treated the patient in an acute care facility and will not continue treatment after discharge.    Follow-up provider: UCHE REYNOLDS SR [[id number]]    Reason/Clinical Findings: post hospital admission    Describe mobility limitations that make leaving home difficult: weakness    Nursing/Therapeutic  Services Requested: Other ([facility] to eval )    Frequency: 1 Week 1         Ambulatory Referral to Home Health   As directed      Face to Face Visit Date: 4/4/2021    Follow-up provider for Plan of Care?: I will be treating the patient on an ongoing basis.  Please send me the Plan of Care for signature.    Follow-up provider: KENNEDY ANGULO [[id number]]    Reason/Clinical Findings: Status post CABG    Describe mobility limitations that make leaving home difficult: Status post CABG    Nursing/Therapeutic Services Requested: Skilled Nursing Physical Therapy    Skilled nursing orders: Post CABG care Medication education Cardiopulmonary assessments    PT orders: Therapeutic exercise Home safety assessment Strengthening    Frequency: 1 Week 1         Discharge Follow-up with PCP   As directed       Currently Documented PCP:    David Vallejo Sr., MD    PCP Phone Number:    [phone]     Follow Up Details: Follow Up With PCP Within 7 Days if Not Able to Return to [facility] for Appointment Within 7 Days         Discharge Follow-up with Specialty: Cardiology; 2 Weeks   As directed      Specialty: Cardiology    Follow Up: 2 Weeks         Discharge Follow-up with Specialty: Cardiothoracic Surgery   As directed      Follow Up Details: Follow Up in 2-4 Weeks    Specialty: Cardiothoracic Surgery         Cardiac Rehab Evaluation and Enrollment   Apr 09, 2021      Reason for Consult?: Education               Test Results Pending at Discharge       Sravan Bolivar MD  04/04/21  17:26 EDT                Electronically signed by Jr Sravan Bolivar MD at 04/04/21 6619

## 2021-04-07 LAB — QT INTERVAL: 391 MS

## 2021-04-08 ENCOUNTER — TELEPHONE (OUTPATIENT)
Dept: CARDIAC SURGERY | Facility: CLINIC | Age: 56
End: 2021-04-08

## 2021-04-08 ENCOUNTER — TELEPHONE (OUTPATIENT)
Dept: CARDIOLOGY | Facility: CLINIC | Age: 56
End: 2021-04-08

## 2021-04-08 NOTE — TELEPHONE ENCOUNTER
Would like a call back about the bypass he had last week  He has a little swelling above the breast plate

## 2021-04-08 NOTE — TELEPHONE ENCOUNTER
I spoke with patient and we discussed post op recovery I was able to answer his questions He will call back with any further concerns

## 2021-04-12 ENCOUNTER — READMISSION MANAGEMENT (OUTPATIENT)
Dept: CALL CENTER | Facility: HOSPITAL | Age: 56
End: 2021-04-12

## 2021-04-12 NOTE — OUTREACH NOTE
CT Surgery Week 2 Survey      Responses   [facility] patient discharged from?  [facility]   Does the patient have one of the following disease processes/diagnoses(primary or secondary)?  Cardiothoracic surgery   Week 2 attempt successful?  Yes   Call start time  1645   Call end time  1651   Discharge diagnosis  CAD, CABG x 4    Is patient permission given to speak with other caregiver?  Yes   List who call center can speak with  wife   Meds reviewed with patient/caregiver?  Yes   Is the patient having any side effects they believe may be caused by any medication additions or changes?  No   Is the patient taking all medications as directed (includes completed medication regime)?  Yes   Has the patient kept scheduled appointments due by today?  N/A   Psychosocial issues?  No   Comments  1500-1750ml on IS, denies SOA. Pain well controlled. Wife is checking incisions for s/sx of infections. appetite is decreased.    What is the patient's perception of their health status since discharge?  Improving   Is the patient/caregiver able to teach back normal signs of recovery?  Nausea and lack of appetite   Is the patient /caregiver able to teach back basic post-op care?  Continue use of incentive spirometry at least 1 week post discharge, Practice 'cough and deep breath', Take showers only when approved by MD-sponge bathe until then, Drive as instructed by MD in discharge instructions   Is the patient/caregiver able to teach back signs and symptoms of incisional infection?  Increased drainage or bleeding, Increased redness, swelling or pain at the incisonal site   Is the patient/caregiver able to teach back steps to recovery at home?  Set small, achievable goals for return to baseline health, Rest and rebuild strength, gradually increase activity   Week 2 call completed?  Yes          Michelle Oswald RN

## 2021-04-15 ENCOUNTER — TELEPHONE (OUTPATIENT)
Dept: CARDIAC SURGERY | Facility: CLINIC | Age: 56
End: 2021-04-15

## 2021-04-15 NOTE — TELEPHONE ENCOUNTER
Patient calls concerned that his SVHS has developed a small knot slightly above the incision It is not warm and the swelling has increased slightly It is not painful He sent a picture that was reviewed Sherley BERNAL suggest using a warm compress. He will call if his condition changes and his appointment has been move up from 4/22 to 4/19

## 2021-04-19 ENCOUNTER — OFFICE VISIT (OUTPATIENT)
Dept: CARDIAC SURGERY | Facility: CLINIC | Age: 56
End: 2021-04-19

## 2021-04-19 VITALS
RESPIRATION RATE: 20 BRPM | DIASTOLIC BLOOD PRESSURE: 69 MMHG | HEIGHT: 69 IN | TEMPERATURE: 98 F | HEART RATE: 91 BPM | OXYGEN SATURATION: 97 % | SYSTOLIC BLOOD PRESSURE: 89 MMHG | BODY MASS INDEX: 25.03 KG/M2 | WEIGHT: 169 LBS

## 2021-04-19 DIAGNOSIS — Z95.1 S/P CABG X 4: Primary | ICD-10-CM

## 2021-04-19 PROCEDURE — 99024 POSTOP FOLLOW-UP VISIT: CPT | Performed by: NURSE PRACTITIONER

## 2021-04-19 RX ORDER — ASPIRIN 81 MG/1
81 TABLET ORAL DAILY
COMMUNITY

## 2021-04-19 NOTE — PATIENT INSTRUCTIONS
"Heart Failure Eating Plan  Heart failure, also called congestive heart failure, occurs when your heart does not pump blood well enough to meet your body's needs for oxygen-rich blood. Heart failure is a long-term (chronic) condition. Living with heart failure can be challenging. However, following your health care provider's instructions about a healthy lifestyle and working with a diet and nutrition specialist (dietitian) to choose the right foods may help to improve your symptoms.  What are tips for following this plan?  Reading food labels  1. Check food labels for the amount of sodium per serving. Choose foods that have less than 140 mg (milligrams) of sodium in each serving.  2. Check food labels for the number of calories per serving. This is important if you need to limit your daily calorie intake to lose weight.  3. Check food labels for the serving size. If you eat more than one serving, you will be eating more sodium and calories than what is listed on the label.  4. Look for foods that are labeled as \"sodium-free,\" \"very low sodium,\" or \"low sodium.\"  ? Foods labeled as \"reduced sodium\" or \"lightly salted\" may still have more sodium than what is recommended for you.  Cooking  1. Avoid adding salt when cooking. Ask your health care provider or dietitian before using salt substitutes.  2. Season food with salt-free seasonings, spices, or herbs. Check the label of seasoning mixes to make sure they do not contain salt.  3. Cook with heart-healthy oils, such as olive, canola, soybean, or sunflower oil.  4. Do not louis foods. Cook foods using low-fat methods, such as baking, boiling, grilling, and broiling.  5. Limit unhealthy fats when cooking by:  ? Removing the skin from poultry, such as chicken.  ? Removing all visible fats from meats.  ? Skimming the fat off from stews, soups, and gravies before serving them.  Meal planning       1. Limit your intake of:  ? Processed, canned, or pre-packaged foods.  ? Foods " that are high in trans fat, such as fried foods.  ? Sweets, desserts, sugary drinks, and other foods with added sugar.  ? Full-fat dairy products, such as whole milk.  2. Eat a balanced diet that includes:  ? 4-5 servings of fruit each day and 4-5 servings of vegetables each day. At each meal, try to fill half of your plate with fruits and vegetables.  ? Up to 6-8 servings of whole grains each day.  ? Up to 2 servings of lean meat, poultry, or fish each day. One serving of meat is equal to 3 oz. This is about the same size as a deck of cards.  ? 2 servings of low-fat dairy each day.  ? Heart-healthy fats. Healthy fats called omega-3 fatty acids are found in foods such as flaxseed and cold-water fish like sardines, salmon, and mackerel.  3. Aim to eat 25-35 g (grams) of fiber a day. Foods that are high in fiber include apples, broccoli, carrots, beans, peas, and whole grains.  4. Do not add salt or condiments that contain salt (such as soy sauce) to foods before eating.  5. When eating at a restaurant, ask that your food be prepared with less salt or no salt, if possible.  6. Try to eat 2 or more vegetarian meals each week.  7. Eat more home-cooked food and eat less restaurant, buffet, and fast food.  General information  1. Do not eat more than 2,300 mg of salt (sodium) a day. The amount of sodium that is recommended for you may be lower, depending on your condition.  2. Maintain a healthy body weight as directed. Ask your health care provider what a healthy weight is for you.  ? Check your weight every day.  ? Work with your health care provider and dietitian to make a plan that is right for you to lose weight or maintain your current weight.  3. Limit how much fluid you drink. Ask your health care provider or dietitian how much fluid you can have each day.  4. Limit or avoid alcohol as told by your health care provider or dietitian.  Recommended foods  The items listed may not be a complete list. Talk with your  dietitian about what dietary choices are best for you.  Fruits  All fresh, frozen, and canned fruits. Dried fruits, such as raisins, prunes, and cranberries.  Vegetables  All fresh vegetables. Vegetables that are frozen without sauce or added salt. Low-sodium or sodium-free canned vegetables.  Grains  Bread with less than 80 mg of sodium per slice. Whole-wheat pasta, quinoa, and brown rice. Oats and oatmeal. Barley. Millet. Grits and cream of wheat. Whole-grain and whole-wheat cold cereal.  Meats and other protein foods  Lean cuts of meat. Skinless chicken and turkey. Fish with high omega-3 fatty acids, such as salmon, sardines, and other cold-water fishes. Eggs. Dried beans, peas, and edamame. Unsalted nuts and nut butters.  Dairy  Low-fat or nonfat (skim) milk and dried milk. Rice milk, soy milk, and almond milk. Low-fat or nonfat yogurt. Small amounts of reduced-sodium block cheese. Low-sodium cottage cheese.  Fats and oils  Olive, canola, soybean, flaxseed, or sunflower oil. Avocado.  Sweets and desserts  Apple sauce. Granola bars. Sugar-free pudding and gelatin. Frozen fruit bars.  Seasoning and other foods  Fresh and dried herbs. Lemon or lime juice. Vinegar. Low-sodium ketchup. Salt-free marinades, salad dressings, sauces, and seasonings.  The items listed above may not be a complete list of foods and beverages you can eat. Contact a dietitian for more information.  Foods to avoid  The items listed may not be a complete list. Talk with your dietitian about what dietary choices are best for you.  Fruits  Fruits that are dried with sodium-containing preservatives.  Vegetables  Canned vegetables. Frozen vegetables with sauce or seasonings. Creamed vegetables. French fries. Onion rings. Pickled vegetables and sauerkraut.  Grains  Bread with more than 80 mg of sodium per slice. Hot or cold cereal with more than 140 mg sodium per serving. Salted pretzels and crackers. Pre-packaged breadcrumbs. Bagels, croissants,  and biscuits.  Meats and other protein foods  Ribs and chicken wings. Long, ham, pepperoni, bologna, salami, and packaged luncheon meats. Hot dogs, bratwurst, and sausage. Canned meat. Smoked meat and fish. Salted nuts and seeds.  Dairy  Whole milk, half-and-half, and cream. Buttermilk. Processed cheese, cheese spreads, and cheese curds. Regular cottage cheese. Feta cheese. Shredded cheese. String cheese.  Fats and oils  Butter, lard, shortening, ghee, and long fat. Canned and packaged gravies.  Seasoning and other foods  Onion salt, garlic salt, table salt, and sea salt. Marinades. Regular salad dressings. Relishes, pickles, and olives. Meat flavorings and tenderizers, and bouillon cubes. Horseradish, ketchup, and mustard. Worcestershire sauce. Teriyaki sauce, soy sauce (including reduced sodium). Hot sauce and Tabasco sauce. Steak sauce, fish sauce, oyster sauce, and cocktail sauce. Taco seasonings. Barbecue sauce. Tartar sauce.  The items listed above may not be a complete list of foods and beverages you should avoid. Contact a dietitian for more information.  Summary  · A heart failure eating plan includes changes that limit your intake of sodium and unhealthy fat, and it may help you lose weight or maintain a healthy weight. Your health care provider may also recommend limiting how much fluid you drink.  · Most people with heart failure should eat no more than 2,300 mg of salt (sodium) a day. The amount of sodium that is recommended for you may be lower, depending on your condition.  · Contact your health care provider or dietitian before making any major changes to your diet.  This information is not intended to replace advice given to you by your health care provider. Make sure you discuss any questions you have with your health care provider.  Document Revised: 02/13/2020 Document Reviewed: 05/04/2018  Elsevier Patient Education © 2020 [employer] Inc[employer]

## 2021-04-19 NOTE — PROGRESS NOTES
"CARDIOVASCULAR SURGERY FOLLOW-UP PROGRESS NOTE  Chief Complaint: Post-op Follow Up        HPI:   Dear Dr. Vallejo, David AVILES Sr., MD and colleagues:    It was nice to see Jean-Claude LASSITER Venessa Bearden in follow up today after cardiac surgery.  As you know, he is a 55 y.o. male with MV CAD and HTN who underwent CABG at [facility] by Dr. Axel Smyth on 3/31/2021. He did well postoperatively and continues to do well. He comes in today complaining of nothing.  His activity level has been good.  He readily admits he is still has not looked at his sternotomy incision and waited one week before he showered.  He was afraid he would mess it up.  His BP today is 89/69.  He does not have any symptoms of hypotension.  He has been taking 25 mg metoprolol BID instead of 12.5 mg BID.  He denies any lightheadedness, dizziness, or diaphoresis.  He is not in cardiac rehab and is not interested in enrolling.  He has not seen Dr. Taylor yet--appt is 4/30.  He is alone for his appt.        Physical Exam:         BP (!) 89/69 (BP Location: Left arm, Patient Position: Sitting, Cuff Size: Adult)   Pulse 91   Temp 98 °F (36.7 °C) (Oral)   Resp 20   Ht 174 cm (68.5\")   Wt 76.7 kg (169 lb)   SpO2 97%   BMI 25.32 kg/m²   Heart:  regular rate and rhythm  Lungs:  clear to auscultation bilaterally  Extremities:  no edema, but noted bridge hematoma of LLE distal to knee, ecchymosis also noted.  Incision(s):  mid chest healing well, left leg healing well, sternum stable; noted dried blood along sternotomy that was easily removed    Assessment/Plan:     S/P CABG. Overall, he is doing well.  We have discussed the importance of decreasing metoprolol to 12.5 mg BID.  He is wanting to decrease his pill burden.  Meds reviewed and he should stay on his current meds for now.  Dr. Taylor started Plavix while he was in the hospital so that medication will be deferred to him.  He continues to advance his activity level.    No significant post-op complications    OK to " drive if not taking narcotic pain medicine  Follow-up as scheduled with cardiology--Dr. Taylor 4/30  Follow-up with CT surgery--2 weeks for recheck of LLE    No restrictions of activity.    Current outpatient and discharge medications have been reconciled for the patient.  Reviewed by: DOLORES Dumont    Thank you for allowing me to participate in the care of your patient.    Regards,  DOLORES Medellin

## 2021-04-20 ENCOUNTER — READMISSION MANAGEMENT (OUTPATIENT)
Dept: CALL CENTER | Facility: HOSPITAL | Age: 56
End: 2021-04-20

## 2021-04-20 NOTE — OUTREACH NOTE
CT Surgery Week 3 Survey      Responses   [facility] patient discharged from?  [facility]   Does the patient have one of the following disease processes/diagnoses(primary or secondary)?  Cardiothoracic surgery   Week 3 attempt successful?  Yes   Call start time  1317   Call end time  1319   Discharge diagnosis  CAD, CABG x 4    Meds reviewed with patient/caregiver?  Yes   Is the patient having any side effects they believe may be caused by any medication additions or changes?  No   Does the patient have all medications related to this admission filled (includes all antibiotics, pain medications, cardiac medications, etc.)  Yes   Is the patient taking all medications as directed (includes completed medication regime)?  Yes   Does the patient have a primary care provider?   Yes   Does the patient have an appointment scheduled with their C/T surgeon?  Yes   Has the patient kept scheduled appointments due by today?  Yes   Has home health visited the patient within 72 hours of discharge?  N/A   Psychosocial issues?  No   Did the patient receive a copy of their discharge instructions?  Yes   Nursing interventions  Reviewed instructions with patient   What is the patient's perception of their health status since discharge?  Improving   Nursing interventions  Nurse provided patient education   Is the patient/caregiver able to teach back normal signs of recovery?  Nausea and lack of appetite   Nursing interventions  Reassured on normal signs of recovery   Is the patient /caregiver able to teach back basic post-op care?  Continue use of incentive spirometry at least 1 week post discharge, Practice 'cough and deep breath', Take showers only when approved by MD-sponge bathe until then, Drive as instructed by MD in discharge instructions   Is the patient/caregiver able to teach back signs and symptoms of incisional infection?  Increased drainage or bleeding, Increased redness, swelling or pain at the incisonal site   Is the  patient/caregiver able to teach back steps to recovery at home?  Set small, achievable goals for return to baseline health, Rest and rebuild strength, gradually increase activity   Is the patient /caregiver able to teach back the importance of cardiac rehab?  Yes   If the patient is a current smoker, are they able to teach back resources for cessation?  Not a smoker   Is the patient/caregiver able to teach back the hierarchy of who to call/visit for symptoms/problems? PCP, Specialist, Home health nurse, Urgent Care, ED, 911  Yes   Week 3 call completed?  Yes          Julian Faulkner RN

## 2021-04-23 RX ORDER — POTASSIUM CHLORIDE 20 MEQ/1
20 TABLET, EXTENDED RELEASE ORAL DAILY
Qty: 30 TABLET | Refills: 0 | OUTPATIENT
Start: 2021-04-23

## 2021-04-23 RX ORDER — FUROSEMIDE 40 MG/1
40 TABLET ORAL DAILY
Qty: 30 TABLET | Refills: 0 | OUTPATIENT
Start: 2021-04-23

## 2021-04-27 ENCOUNTER — READMISSION MANAGEMENT (OUTPATIENT)
Dept: CALL CENTER | Facility: HOSPITAL | Age: 56
End: 2021-04-27

## 2021-04-27 NOTE — OUTREACH NOTE
CT Surgery Week 4 Survey      Responses   [facility] patient discharged from?  [facility]   Does the patient have one of the following disease processes/diagnoses(primary or secondary)?  Cardiothoracic surgery   Week 4 attempt successful?  Yes   Call start time  1631   Call end time  1634   Discharge diagnosis  CAD, CABG x 4    Meds reviewed with patient/caregiver?  Yes   Is the patient taking all medications as directed (includes completed medication regime)?  Yes   Medication comments  off pain pills   Has the patient kept scheduled appointments due by today?  Yes   Is the patient still receiving Home Health Services?  N/A   Psychosocial issues?  No   Comments  2000ml on IS   What is the patient's perception of their health status since discharge?  Improving   Is the patient/caregiver able to teach back steps to recovery at home?  Rest and rebuild strength, gradually increase activity   Is the patient /caregiver able to teach back the importance of cardiac rehab?  -- [Pt does not have good health insurance and won't do rehab. has been walking on his own and tolerating well. ]   Is the patient/caregiver able to teach back the hierarchy of who to call/visit for symptoms/problems? PCP, Specialist, Home health nurse, Urgent Care, ED, 911  Yes   Week 4 Call Completed?  Yes   Would the patient like one additional call?  No   Graduated  Yes   Is the patient interested in additional calls from an ambulatory ?  NOTE:  applies to high risk patients requiring additional follow-up.  No   Did the patient feel the follow up calls were helpful during their recovery period?  Yes   Was the number of calls appropriate?  Yes          Harriet Joseph RN

## 2021-05-03 ENCOUNTER — OFFICE VISIT (OUTPATIENT)
Dept: CARDIAC SURGERY | Facility: CLINIC | Age: 56
End: 2021-05-03

## 2021-05-03 VITALS
SYSTOLIC BLOOD PRESSURE: 96 MMHG | TEMPERATURE: 98 F | HEIGHT: 69 IN | HEART RATE: 63 BPM | RESPIRATION RATE: 20 BRPM | OXYGEN SATURATION: 99 % | BODY MASS INDEX: 24.96 KG/M2 | WEIGHT: 168.5 LBS | DIASTOLIC BLOOD PRESSURE: 66 MMHG

## 2021-05-03 DIAGNOSIS — Z09 POSTOPERATIVE FOLLOW-UP: ICD-10-CM

## 2021-05-03 DIAGNOSIS — Z95.1 S/P CABG X 4: Primary | ICD-10-CM

## 2021-05-03 PROCEDURE — 99024 POSTOP FOLLOW-UP VISIT: CPT | Performed by: NURSE PRACTITIONER

## 2021-05-03 NOTE — PROGRESS NOTES
"CARDIOVASCULAR SURGERY FOLLOW-UP PROGRESS NOTE  Chief Complaint: Post-op Follow Up        HPI:   Dear Dr. Vallejo, David AVILES Sr., MD and colleagues:    It was nice to see Jean-Claude Clifford  in follow up today after cardiac surgery.  As you know, he is a 55 y.o. male with MV CAD and HTN who underwent CABG at [facility] by Dr. Axel Smyth on 3/31/21. He did well postoperatively and continues to do well. He comes in today complaining of nothing. He is interested in decreasing his pill burden. We discussed each of his medications and the importance of each at this point in his recovery. He voiced understanding and reports he will remain compliant with his medication regimen. His activity level has been good. He feels he is moving well at home and is not interested in cardiac rehab currently. His surgical incisions are healing well without redness or drainage. He denies any concerns or complaints but agrees to contact our office should any arise. We will follow with him as needed.    Physical Exam:         BP 96/66 (BP Location: Right arm, Patient Position: Sitting, Cuff Size: Adult)   Pulse 63   Temp 98 °F (36.7 °C) (Oral)   Resp 20   Ht 174 cm (68.5\")   Wt 76.4 kg (168 lb 8 oz)   SpO2 99%   BMI 25.25 kg/m²   Heart:  regular rate and rhythm, S1, S2 normal, no murmur, click, rub or gallop  Lungs:  clear to auscultation bilaterally  Extremities:  no edema  Incision(s):  mid chest healing well, no significant drainage, no dehiscence, no significant erythema, left leg healing well, no significant drainage, no dehiscence, no significant erythema    Assessment/Plan:     S/P CABG. Overall, he is doing well.    No significant post-op complications    Keep incisions clean and dry  OK to begin cardiac rehab  Follow-up as scheduled with cardiology  Follow-up with CT surgery prn      Thank you for allowing me to participate in the care of your patient.    Regards,  HERBIE DAWKINS, DOLORES      "

## 2021-05-06 NOTE — PROGRESS NOTES
Date of Office Visit: 2021  Encounter Provider: Dr. Félix Taylor  Place of Service: [facility] CARDIOLOGY [place]  Patient Name: Jean-Claude Clifford Jr.  :1965  David Vallejo Sr., MD    Chief Complaint   Patient presents with   • Coronary Artery Disease     hospital follow up   • Shortness of Breath   • Chest Pain     History of Present Illness    I am pleased to see Mr. Clifford in my office today as a follow-up.    As you know, patient is 55-year-old white female whose past medical history is insignificant for medical illness who came today for follow-up.    In 2021, patient was presented to me for symptom of chest pain.  I recommended to proceed with stress test which showed inferior myocardial ischemia.  Subsequent cardiac catheterization showed three-vessel coronary artery disease.  Patient underwent CABG x4 and postoperatively did well.  Patient was subsequently discharged.    Since the discharge, patient is extremely doing well.  Patient has resumed all activities.  There is no shortness of breath.  No leg edema noted.  No orthopnea PND no syncope or presyncope.  No palpitation.    I am very pleased with the patient progress.  Patient is doing well.  I will continue current treatment.  Patient does not want to do cardiac rehab.  Patient is doing activities and exercise at home.  He has home gym.  I encouraged to continue those activities.        Past Medical History:   Diagnosis Date   • Anxiety    • Coronary artery disease    • Depression    • Migraines    • Mild cognitive impairment          Past Surgical History:   Procedure Laterality Date   • CARDIAC CATHETERIZATION N/A 3/30/2021    Procedure: Left Heart Cath;  Surgeon: Félix Taylor MD;  Location: [facility] INVASIVE LOCATION;  Service: Cardiovascular;  Laterality: N/A;   • CHOLECYSTECTOMY     • CORONARY ARTERY BYPASS GRAFT N/A 3/31/2021    Procedure: CORONARY ARTERY BYPASS GRAFTING;  Surgeon: Axel Smyth MD;   Location: [facility];  Service: Cardiothoracic;  Laterality: N/A;   • FOOT SURGERY     • KNEE ARTHROSCOPY     • NOSE SURGERY             Current Outpatient Medications:   •  aspirin 81 MG EC tablet, Take 81 mg by mouth Daily., Disp: , Rfl:   •  atorvastatin (LIPITOR) 40 MG tablet, Take 1 tablet by mouth Every Night., Disp: 60 tablet, Rfl: 5  •  clopidogrel (PLAVIX) 75 MG tablet, Take 1 tablet by mouth Daily., Disp: 30 tablet, Rfl: 5  •  metoprolol tartrate (LOPRESSOR) 25 MG tablet, Take 0.5 tablets by mouth Every 12 (Twelve) Hours., Disp: 30 tablet, Rfl: 5  •  Multiple Vitamins-Minerals (MULTIVITAMIN ADULTS 50+ PO), Take 1 tablet by mouth Daily., Disp: , Rfl:       Social History     Socioeconomic History   • Marital status:      Spouse name: Not on file   • Number of children: Not on file   • Years of education: Not on file   • Highest education level: Not on file   Tobacco Use   • Smoking status: Never Smoker   • Smokeless tobacco: Never Used   Vaping Use   • Vaping Use: Never used   Substance and Sexual Activity   • Alcohol use: No   • Drug use: Never   • Sexual activity: Yes     Partners: Female         Review of Systems   Constitutional: Negative for chills and fever.   HENT: Negative for ear discharge and nosebleeds.    Eyes: Negative for discharge and redness.   Cardiovascular: Negative for chest pain, orthopnea, palpitations, paroxysmal nocturnal dyspnea and syncope.   Respiratory: Negative for cough, shortness of breath and wheezing.    Endocrine: Negative for heat intolerance.   Skin: Negative for rash.   Musculoskeletal: Negative for arthritis and myalgias.   Gastrointestinal: Negative for abdominal pain, melena, nausea and vomiting.   Genitourinary: Negative for dysuria and hematuria.   Neurological: Negative for dizziness, light-headedness, numbness and tremors.   Psychiatric/Behavioral: Negative for depression. The patient is not nervous/anxious.        Procedures    Procedures    No orders to  "display           Objective:    BP 96/63   Pulse 74   Ht 174 cm (68.5\")   Wt 78.5 kg (173 lb)   BMI 25.92 kg/m²         Constitutional:       Appearance: Well-developed.   Eyes:      General: No scleral icterus.        Right eye: No discharge.   HENT:      Head: Normocephalic and atraumatic.   Neck:      Thyroid: No thyromegaly.      Lymphadenopathy: No cervical adenopathy.   Pulmonary:      Effort: Pulmonary effort is normal. No respiratory distress.      Breath sounds: Normal breath sounds. No wheezing. No rales.   Cardiovascular:      Normal rate. Regular rhythm.      No gallop.   Abdominal:      Tenderness: There is no abdominal tenderness.   Skin:     Findings: No erythema or rash.   Neurological:      Mental Status: Alert and oriented to person, place, and time.             Assessment:       Diagnosis Plan   1. Bradycardia, sinus     2. Unstable angina pectoris (CMS/HCC)     3. Coronary artery disease involving native coronary artery of native heart with unstable angina pectoris (CMS/HCC)     4. Other chest pain     5. Short of breath on exertion              Plan:       MDM:    1.  CAD:    Patient had three-vessel coronary artery disease and underwent CABG.    2.  Angina pectoris:    After CABG his symptoms have resolved.    3.  Hyperlipidemia:    Patient is on Lipitor.  Repeat lab work in future.  "

## 2021-05-07 ENCOUNTER — OFFICE VISIT (OUTPATIENT)
Dept: CARDIOLOGY | Facility: CLINIC | Age: 56
End: 2021-05-07

## 2021-05-07 VITALS
HEART RATE: 74 BPM | WEIGHT: 173 LBS | HEIGHT: 69 IN | SYSTOLIC BLOOD PRESSURE: 96 MMHG | DIASTOLIC BLOOD PRESSURE: 63 MMHG | BODY MASS INDEX: 25.62 KG/M2

## 2021-05-07 DIAGNOSIS — I25.110 CORONARY ARTERY DISEASE INVOLVING NATIVE CORONARY ARTERY OF NATIVE HEART WITH UNSTABLE ANGINA PECTORIS (HCC): ICD-10-CM

## 2021-05-07 DIAGNOSIS — R00.1 BRADYCARDIA, SINUS: Primary | ICD-10-CM

## 2021-05-07 DIAGNOSIS — I20.0 UNSTABLE ANGINA PECTORIS (HCC): ICD-10-CM

## 2021-05-07 DIAGNOSIS — R06.02 SHORT OF BREATH ON EXERTION: ICD-10-CM

## 2021-05-07 DIAGNOSIS — R07.89 OTHER CHEST PAIN: ICD-10-CM

## 2021-05-07 PROCEDURE — 99213 OFFICE O/P EST LOW 20 MIN: CPT | Performed by: INTERNAL MEDICINE

## 2021-05-07 RX ORDER — ISOSORBIDE MONONITRATE 30 MG/1
30 TABLET, EXTENDED RELEASE ORAL DAILY
COMMUNITY
Start: 2021-04-25 | End: 2021-05-07

## 2021-05-27 ENCOUNTER — TELEPHONE (OUTPATIENT)
Dept: CARDIAC SURGERY | Facility: CLINIC | Age: 56
End: 2021-05-27

## 2021-05-27 NOTE — TELEPHONE ENCOUNTER
Spoke with , states he is starting to have some skin tightness and sensations that are uncomfortable at times, discussed with Amina BERNAL, per Amina rojas,   Advised pt if conditions worsen or no improvement to notify office.     ----- Message from Brittany Jeffrey sent at 5/27/2021 12:56 PM EDT -----  Patient called , had surgery 3/31/21, stated he's not feeling great, not sure if it's anything to worry   About.   Please call  [phone]

## 2021-06-17 RX ORDER — FUROSEMIDE 40 MG/1
40 TABLET ORAL DAILY
Qty: 30 TABLET | Refills: 0 | OUTPATIENT
Start: 2021-06-17

## 2021-06-17 RX ORDER — POTASSIUM CHLORIDE 20 MEQ/1
20 TABLET, EXTENDED RELEASE ORAL DAILY
Qty: 30 TABLET | Refills: 0 | OUTPATIENT
Start: 2021-06-17

## 2021-07-01 ENCOUNTER — OFFICE VISIT (OUTPATIENT)
Dept: CARDIAC SURGERY | Facility: CLINIC | Age: 56
End: 2021-07-01

## 2021-07-01 ENCOUNTER — TELEPHONE (OUTPATIENT)
Dept: CARDIAC SURGERY | Facility: CLINIC | Age: 56
End: 2021-07-01

## 2021-07-01 ENCOUNTER — APPOINTMENT (OUTPATIENT)
Dept: CT IMAGING | Facility: HOSPITAL | Age: 56
End: 2021-07-01

## 2021-07-01 ENCOUNTER — HOSPITAL ENCOUNTER (OUTPATIENT)
Facility: HOSPITAL | Age: 56
Setting detail: OBSERVATION
Discharge: HOME | End: 2021-07-02
Attending: THORACIC SURGERY (CARDIOTHORACIC VASCULAR SURGERY) | Admitting: THORACIC SURGERY (CARDIOTHORACIC VASCULAR SURGERY)

## 2021-07-01 VITALS
SYSTOLIC BLOOD PRESSURE: 100 MMHG | DIASTOLIC BLOOD PRESSURE: 68 MMHG | RESPIRATION RATE: 20 BRPM | HEIGHT: 68 IN | HEART RATE: 68 BPM | TEMPERATURE: 97.3 F | WEIGHT: 176.5 LBS | OXYGEN SATURATION: 100 % | BODY MASS INDEX: 26.75 KG/M2

## 2021-07-01 DIAGNOSIS — Z09 POSTOPERATIVE FOLLOW-UP: ICD-10-CM

## 2021-07-01 DIAGNOSIS — Z95.1 S/P CABG X 4: Primary | ICD-10-CM

## 2021-07-01 LAB
ALBUMIN SERPL-MCNC: 4.4 G/DL (ref 3.5–5.2)
ALBUMIN/GLOB SERPL: 1.8 G/DL
ALP SERPL-CCNC: 155 U/L (ref 39–117)
ALT SERPL W P-5'-P-CCNC: 76 U/L (ref 1–41)
ANION GAP SERPL CALCULATED.3IONS-SCNC: 8 MMOL/L (ref 5–15)
AST SERPL-CCNC: 57 U/L (ref 1–40)
BASOPHILS # BLD AUTO: 0.1 10*3/MM3 (ref 0–0.2)
BASOPHILS NFR BLD AUTO: 1.3 % (ref 0–1.5)
BILIRUB SERPL-MCNC: 0.5 MG/DL (ref 0–1.2)
BUN SERPL-MCNC: 9 MG/DL (ref 6–20)
BUN/CREAT SERPL: 10 (ref 7–25)
CALCIUM SPEC-SCNC: 9 MG/DL (ref 8.6–10.5)
CHLORIDE SERPL-SCNC: 103 MMOL/L (ref 98–107)
CO2 SERPL-SCNC: 28 MMOL/L (ref 22–29)
CREAT SERPL-MCNC: 0.9 MG/DL (ref 0.76–1.27)
DEPRECATED RDW RBC AUTO: 44.2 FL (ref 37–54)
EOSINOPHIL # BLD AUTO: 0.1 10*3/MM3 (ref 0–0.4)
EOSINOPHIL NFR BLD AUTO: 3.1 % (ref 0.3–6.2)
ERYTHROCYTE [DISTWIDTH] IN BLOOD BY AUTOMATED COUNT: 13.6 % (ref 12.3–15.4)
GFR SERPL CREATININE-BSD FRML MDRD: 87 ML/MIN/1.73
GLOBULIN UR ELPH-MCNC: 2.4 GM/DL
GLUCOSE SERPL-MCNC: 85 MG/DL (ref 65–99)
HCT VFR BLD AUTO: 42.4 % (ref 37.5–51)
HGB BLD-MCNC: 14.4 G/DL (ref 13–17.7)
LYMPHOCYTES # BLD AUTO: 1.3 10*3/MM3 (ref 0.7–3.1)
LYMPHOCYTES NFR BLD AUTO: 33.1 % (ref 19.6–45.3)
MCH RBC QN AUTO: 31.8 PG (ref 26.6–33)
MCHC RBC AUTO-ENTMCNC: 33.9 G/DL (ref 31.5–35.7)
MCV RBC AUTO: 93.8 FL (ref 79–97)
MONOCYTES # BLD AUTO: 0.4 10*3/MM3 (ref 0.1–0.9)
MONOCYTES NFR BLD AUTO: 9.9 % (ref 5–12)
MRSA DNA SPEC QL NAA+PROBE: NORMAL
NEUTROPHILS NFR BLD AUTO: 2.1 10*3/MM3 (ref 1.7–7)
NEUTROPHILS NFR BLD AUTO: 52.6 % (ref 42.7–76)
NRBC BLD AUTO-RTO: 0 /100 WBC (ref 0–0.2)
PLATELET # BLD AUTO: 199 10*3/MM3 (ref 140–450)
PMV BLD AUTO: 7.1 FL (ref 6–12)
POTASSIUM SERPL-SCNC: 4.1 MMOL/L (ref 3.5–5.2)
PROT SERPL-MCNC: 6.8 G/DL (ref 6–8.5)
RBC # BLD AUTO: 4.52 10*6/MM3 (ref 4.14–5.8)
SODIUM SERPL-SCNC: 139 MMOL/L (ref 136–145)
WBC # BLD AUTO: 4 10*3/MM3 (ref 3.4–10.8)

## 2021-07-01 PROCEDURE — G0378 HOSPITAL OBSERVATION PER HR: HCPCS

## 2021-07-01 PROCEDURE — 25010000002 VANCOMYCIN 1 G RECONSTITUTED SOLUTION 1 EACH VIAL: Performed by: NURSE PRACTITIONER

## 2021-07-01 PROCEDURE — 99024 POSTOP FOLLOW-UP VISIT: CPT | Performed by: NURSE PRACTITIONER

## 2021-07-01 PROCEDURE — 80053 COMPREHEN METABOLIC PANEL: CPT | Performed by: NURSE PRACTITIONER

## 2021-07-01 PROCEDURE — 87641 MR-STAPH DNA AMP PROBE: CPT | Performed by: NURSE PRACTITIONER

## 2021-07-01 PROCEDURE — G0379 DIRECT REFER HOSPITAL OBSERV: HCPCS

## 2021-07-01 PROCEDURE — 94799 UNLISTED PULMONARY SVC/PX: CPT

## 2021-07-01 PROCEDURE — 87040 BLOOD CULTURE FOR BACTERIA: CPT | Performed by: NURSE PRACTITIONER

## 2021-07-01 PROCEDURE — S0260 H&P FOR SURGERY: HCPCS | Performed by: THORACIC SURGERY (CARDIOTHORACIC VASCULAR SURGERY)

## 2021-07-01 PROCEDURE — 85025 COMPLETE CBC W/AUTO DIFF WBC: CPT | Performed by: NURSE PRACTITIONER

## 2021-07-01 PROCEDURE — 87070 CULTURE OTHR SPECIMN AEROBIC: CPT | Performed by: NURSE PRACTITIONER

## 2021-07-01 PROCEDURE — U0004 COV-19 TEST NON-CDC HGH THRU: HCPCS | Performed by: NURSE PRACTITIONER

## 2021-07-01 PROCEDURE — C9803 HOPD COVID-19 SPEC COLLECT: HCPCS

## 2021-07-01 PROCEDURE — 87205 SMEAR GRAM STAIN: CPT | Performed by: NURSE PRACTITIONER

## 2021-07-01 PROCEDURE — 71250 CT THORAX DX C-: CPT

## 2021-07-01 RX ORDER — MULTIPLE VITAMINS W/ MINERALS TAB 9MG-400MCG
1 TAB ORAL DAILY
Status: DISCONTINUED | OUTPATIENT
Start: 2021-07-02 | End: 2021-07-02 | Stop reason: HOSPADM

## 2021-07-01 RX ORDER — ACETAMINOPHEN 650 MG/1
650 SUPPOSITORY RECTAL EVERY 4 HOURS PRN
Status: DISCONTINUED | OUTPATIENT
Start: 2021-07-01 | End: 2021-07-02

## 2021-07-01 RX ORDER — SODIUM CHLORIDE 0.9 % (FLUSH) 0.9 %
10 SYRINGE (ML) INJECTION AS NEEDED
Status: DISCONTINUED | OUTPATIENT
Start: 2021-07-01 | End: 2021-07-02 | Stop reason: HOSPADM

## 2021-07-01 RX ORDER — ATORVASTATIN CALCIUM 40 MG/1
40 TABLET, FILM COATED ORAL NIGHTLY
Status: DISCONTINUED | OUTPATIENT
Start: 2021-07-01 | End: 2021-07-02 | Stop reason: HOSPADM

## 2021-07-01 RX ORDER — SODIUM CHLORIDE 0.9 % (FLUSH) 0.9 %
10 SYRINGE (ML) INJECTION EVERY 12 HOURS SCHEDULED
Status: DISCONTINUED | OUTPATIENT
Start: 2021-07-01 | End: 2021-07-02 | Stop reason: HOSPADM

## 2021-07-01 RX ORDER — ASPIRIN 81 MG/1
81 TABLET ORAL DAILY
Status: DISCONTINUED | OUTPATIENT
Start: 2021-07-02 | End: 2021-07-02 | Stop reason: HOSPADM

## 2021-07-01 RX ORDER — ONDANSETRON 4 MG/1
4 TABLET, FILM COATED ORAL EVERY 6 HOURS PRN
Status: DISCONTINUED | OUTPATIENT
Start: 2021-07-01 | End: 2021-07-02 | Stop reason: HOSPADM

## 2021-07-01 RX ORDER — ACETAMINOPHEN 325 MG/1
650 TABLET ORAL EVERY 4 HOURS PRN
Status: DISCONTINUED | OUTPATIENT
Start: 2021-07-01 | End: 2021-07-02

## 2021-07-01 RX ADMIN — ATORVASTATIN CALCIUM 40 MG: 40 TABLET, FILM COATED ORAL at 21:09

## 2021-07-01 RX ADMIN — Medication 10 ML: at 21:13

## 2021-07-01 RX ADMIN — VANCOMYCIN HYDROCHLORIDE 1 G: 1 INJECTION, POWDER, LYOPHILIZED, FOR SOLUTION INTRAVENOUS at 17:14

## 2021-07-01 NOTE — TELEPHONE ENCOUNTER
office admit, cvcu, sternal drainage. Called [facility] bed board spoke with Xiomara, per Xiomara bed ready sent to main registration. Called and advised  bed ready and proceed to main registration. He verbalized understanding and this was agreeable.

## 2021-07-01 NOTE — PROGRESS NOTES
"CARDIOVASCULAR SURGERY FOLLOW-UP PROGRESS NOTE  Chief Complaint: Wound check        HPI:   Dear Dr. Vallejo, David AVILES Sr., MD and colleagues:    It was nice to see Jean-Claude Mcdanielsvivien Bearden in follow up today after cardiac surgery.  As you know, he is a 55 y.o. male with CAD who underwent CABG at [facility] by Dr. Axel Smyth on 3/31/21. He did well postoperatively and continues to do well. He comes in today complaining of sternal wound drainage. He reports he has experienced swelling at the proximal end of his incision that \"came to a head.\" Last night the swelling \"burst\" and \"yellow puss and juice\" drained from his incision. Upon exam today, there is slight erythema and fluctuance at the proximal end of his incision. There is a pin hole opening that allows purulent drainage to be expressed. We will admit to [facility] under observation status to further investigate the drainage and rule out sternal wound infection.       Physical Exam:         /68 (BP Location: Right arm, Patient Position: Sitting, Cuff Size: Adult)   Pulse 68   Temp 97.3 °F (36.3 °C)   Resp 20   Ht 172.7 cm (68\")   Wt 80.1 kg (176 lb 8 oz)   SpO2 100%   BMI 26.84 kg/m²   Heart:  regular rate and rhythm, S1, S2 normal, no murmur, click, rub or gallop  Lungs:  clear to auscultation bilaterally  Extremities:  no edema  Incision(s):  mid chest proximal incision with slight erythema and fluctuance, pinhole opening with purulent drainage able to be expressed    Assessment/Plan:     S/P CABG. Overall, he is doing well.    No significant post-op complications    Admit to [facility] for observation      Thank you for allowing me to participate in the care of your patient.    Regards,  DOLORES GARCIA      "

## 2021-07-01 NOTE — PROGRESS NOTES
We are aware of this consult and have reviewed the patient's records.  Patient had a CABG in March 2021 and has some sternal drainage.  Blood cultures and wound cultures are pending and a CT of the chest has been ordered.  Patient is currently on IV vancomycin-agree with this while waiting on cultures.  Patient has been afebrile so far and labs show a normal white blood cell count.  I will order a COVID-19 screen and labs for the morning and we will attempt to see the patient in the morning.

## 2021-07-01 NOTE — PLAN OF CARE
Goal Outcome Evaluation:  Plan of Care Reviewed With: patient        Progress: improving  Outcome Summary: pt sent from office for drainage from sternal incsion. pt is roughly 13 weeks post op.

## 2021-07-01 NOTE — H&P
"    Patient Care Team:  David Vallejo Sr., MD as PCP - General    Chief complaint Sternal Wound Drainage    Subjective     History of Present Illness: Patient with known PMH of CABG with Dr. Smyth on 3/31/2021. Patient had normal course and discharged home. He reports top of his sternal incision began to swell until last night when it \"burst.\" He reports yellow pus then \"juice\" began to drain. Patient was seen in office today where more purulent drainage was able to be expressed. He has been admitted to observation for evaluation of sternal drainage.    Past Medical History:   Diagnosis Date    Anxiety     Coronary artery disease     Depression     Migraines     Mild cognitive impairment      Past Surgical History:   Procedure Laterality Date    CARDIAC CATHETERIZATION N/A 3/30/2021    Procedure: Left Heart Cath;  Surgeon: Félix Taylor MD;  Location: [facility] CATH INVASIVE LOCATION;  Service: Cardiovascular;  Laterality: N/A;    CHOLECYSTECTOMY      CORONARY ARTERY BYPASS GRAFT N/A 3/31/2021    Procedure: CORONARY ARTERY BYPASS GRAFTING;  Surgeon: Axel Smyth MD;  Location: [facility] CVOR;  Service: Cardiothoracic;  Laterality: N/A;    FOOT SURGERY      KNEE ARTHROSCOPY      NOSE SURGERY       Family History   Problem Relation Age of Onset    Heart attack Father     Heart disease Father      Social History     Tobacco Use    Smoking status: Never Smoker    Smokeless tobacco: Never Used   Vaping Use    Vaping Use: Never used   Substance Use Topics    Alcohol use: No    Drug use: Never       Objective      Vital Signs  Body mass index is 26.12 kg/m².  No intake or output data in the 24 hours ending 07/01/21 1536  No intake/output data recorded.  Flowsheet Rows        First Filed Value   Admission Height  172.7 cm (68\") Documented at 07/01/2021 1500   Admission Weight  77.9 kg (171 lb 12.8 oz) Documented at 07/01/2021 1500             Physical Exam  Constitutional:       Appearance: Normal appearance. He is normal " weight.   HENT:      Head: Normocephalic and atraumatic.   Eyes:      Extraocular Movements: Extraocular movements intact.      Pupils: Pupils are equal, round, and reactive to light.   Cardiovascular:      Rate and Rhythm: Normal rate and regular rhythm.      Pulses: Normal pulses.      Heart sounds: Normal heart sounds. No murmur heard.     Pulmonary:      Effort: Pulmonary effort is normal.      Breath sounds: Normal breath sounds.   Abdominal:      General: Bowel sounds are normal. There is no distension.      Palpations: Abdomen is soft.      Tenderness: There is no abdominal tenderness. There is no guarding.   Musculoskeletal:         General: Normal range of motion.      Cervical back: Normal range of motion and neck supple.      Right lower leg: No edema.      Left lower leg: No edema.   Skin:     General: Skin is warm and dry.      Comments: Sternal incision with purulent drainage   Neurological:      General: No focal deficit present.      Mental Status: He is alert and oriented to person, place, and time.   Psychiatric:         Mood and Affect: Mood normal.         Behavior: Behavior normal.         Thought Content: Thought content normal.         Judgment: Judgment normal.         Results Review:   I reviewed the patient's new clinical results.    WBC No results found for: WBC   HGB No results found for: HGB   HCT No results found for: HCT   Platlets No results found for: LABPLAT     PT/INR:    No results found for: PROTIME/No results found for: INR    Sodium No results found for: NA   Potassium No results found for: K   Chloride No results found for: CL   Bicarbonate No results found for: PLASMABICARB   BUN No results found for: BUN   Creatinine No results found for: CREATININE   Calcium No results found for: CALCIUM   Magnesium No results found for: MG     pH No results found for: PHART   pO2 No results found for: PO2ART   pCO2 No results found for: QVM8WAB   HCO3 No results found for: NFN8XJX      Assessment/Plan     Patient Active Problem List   Diagnosis Code    Degenerative joint disease of right acromioclavicular joint M19.011    Nontraumatic tear of rotator cuff M75.100    Other tear of medial meniscus, current injury, left knee, subsequent encounter S83.242D    Orthopedic aftercare Z47.89    Hemorrhoids K64.9    Overweight with body mass index (BMI) of 28 to 28.9 in adult E66.3, Z68.28    Short of breath on exertion R06.02    Actinic keratosis L57.0    Other chest pain R07.89    Bradycardia, sinus R00.1    Chest pain due to myocardial ischemia I25.9    Coronary artery disease involving native coronary artery of native heart with unstable angina pectoris (CMS/HCC) I25.110    S/P CABG x 4 per Dr. Smyth 3/31/2021 Z95.1       Assessment & Plan  -Sternal Wound Drainage  -CAD s/p CABG x4 with LIMA (3/31/21)-- [facility]  -HTN    Admit to observation for evaluation of sternal drainage. CT Chest w/o, CBC, CMP, wound culture and blood cultures. One gram Vancomycin now. Consult ID for further recommendations.    I discussed the patients findings and my recommendations with patient    HERBIE DAWKINS, APRN  07/01/21  15:36 EDT    No obvious sternal drainage on exam today.  Having said that I would like to place him on antibiotics, order CT scan of the chest, and observe him for at least 24 hours.

## 2021-07-02 ENCOUNTER — READMISSION MANAGEMENT (OUTPATIENT)
Dept: CALL CENTER | Facility: HOSPITAL | Age: 56
End: 2021-07-02

## 2021-07-02 VITALS
SYSTOLIC BLOOD PRESSURE: 113 MMHG | OXYGEN SATURATION: 97 % | WEIGHT: 173.5 LBS | DIASTOLIC BLOOD PRESSURE: 64 MMHG | TEMPERATURE: 98.1 F | BODY MASS INDEX: 26.3 KG/M2 | HEIGHT: 68 IN | RESPIRATION RATE: 14 BRPM | HEART RATE: 76 BPM

## 2021-07-02 PROBLEM — L24.A9 DRAINAGE FROM WOUND: Status: ACTIVE | Noted: 2021-07-02

## 2021-07-02 PROBLEM — T14.8XXA DRAINAGE FROM WOUND: Status: ACTIVE | Noted: 2021-07-02

## 2021-07-02 LAB
ANION GAP SERPL CALCULATED.3IONS-SCNC: 8 MMOL/L (ref 5–15)
BASOPHILS # BLD AUTO: 0 10*3/MM3 (ref 0–0.2)
BASOPHILS NFR BLD AUTO: 0.5 % (ref 0–1.5)
BUN SERPL-MCNC: 12 MG/DL (ref 6–20)
BUN/CREAT SERPL: 10.8 (ref 7–25)
CALCIUM SPEC-SCNC: 8.9 MG/DL (ref 8.6–10.5)
CHLORIDE SERPL-SCNC: 106 MMOL/L (ref 98–107)
CO2 SERPL-SCNC: 26 MMOL/L (ref 22–29)
CREAT SERPL-MCNC: 1.11 MG/DL (ref 0.76–1.27)
CRP SERPL-MCNC: <0.3 MG/DL (ref 0–0.5)
DEPRECATED RDW RBC AUTO: 43.3 FL (ref 37–54)
EOSINOPHIL # BLD AUTO: 0.2 10*3/MM3 (ref 0–0.4)
EOSINOPHIL NFR BLD AUTO: 2.7 % (ref 0.3–6.2)
ERYTHROCYTE [DISTWIDTH] IN BLOOD BY AUTOMATED COUNT: 13.2 % (ref 12.3–15.4)
ERYTHROCYTE [SEDIMENTATION RATE] IN BLOOD: 3 MM/HR (ref 0–20)
GFR SERPL CREATININE-BSD FRML MDRD: 68 ML/MIN/1.73
GLUCOSE SERPL-MCNC: 89 MG/DL (ref 65–99)
HCT VFR BLD AUTO: 41.7 % (ref 37.5–51)
HGB BLD-MCNC: 14.2 G/DL (ref 13–17.7)
LYMPHOCYTES # BLD AUTO: 1.7 10*3/MM3 (ref 0.7–3.1)
LYMPHOCYTES NFR BLD AUTO: 28.9 % (ref 19.6–45.3)
MCH RBC QN AUTO: 32 PG (ref 26.6–33)
MCHC RBC AUTO-ENTMCNC: 34.2 G/DL (ref 31.5–35.7)
MCV RBC AUTO: 93.7 FL (ref 79–97)
MONOCYTES # BLD AUTO: 0.5 10*3/MM3 (ref 0.1–0.9)
MONOCYTES NFR BLD AUTO: 9.1 % (ref 5–12)
NEUTROPHILS NFR BLD AUTO: 3.4 10*3/MM3 (ref 1.7–7)
NEUTROPHILS NFR BLD AUTO: 58.8 % (ref 42.7–76)
NRBC BLD AUTO-RTO: 0.1 /100 WBC (ref 0–0.2)
PLATELET # BLD AUTO: 189 10*3/MM3 (ref 140–450)
PMV BLD AUTO: 7.3 FL (ref 6–12)
POTASSIUM SERPL-SCNC: 4 MMOL/L (ref 3.5–5.2)
RBC # BLD AUTO: 4.45 10*6/MM3 (ref 4.14–5.8)
SARS-COV-2 ORF1AB RESP QL NAA+PROBE: NOT DETECTED
SODIUM SERPL-SCNC: 140 MMOL/L (ref 136–145)
WBC # BLD AUTO: 5.8 10*3/MM3 (ref 3.4–10.8)

## 2021-07-02 PROCEDURE — 99024 POSTOP FOLLOW-UP VISIT: CPT | Performed by: THORACIC SURGERY (CARDIOTHORACIC VASCULAR SURGERY)

## 2021-07-02 PROCEDURE — 85025 COMPLETE CBC W/AUTO DIFF WBC: CPT | Performed by: NURSE PRACTITIONER

## 2021-07-02 PROCEDURE — 80048 BASIC METABOLIC PNL TOTAL CA: CPT | Performed by: NURSE PRACTITIONER

## 2021-07-02 PROCEDURE — 85652 RBC SED RATE AUTOMATED: CPT | Performed by: INTERNAL MEDICINE

## 2021-07-02 PROCEDURE — 86140 C-REACTIVE PROTEIN: CPT | Performed by: INTERNAL MEDICINE

## 2021-07-02 PROCEDURE — G0378 HOSPITAL OBSERVATION PER HR: HCPCS

## 2021-07-02 RX ORDER — ACETAMINOPHEN 650 MG/1
650 SUPPOSITORY RECTAL EVERY 4 HOURS PRN
Status: DISCONTINUED | OUTPATIENT
Start: 2021-07-02 | End: 2021-07-02 | Stop reason: HOSPADM

## 2021-07-02 RX ORDER — DOXYCYCLINE 100 MG/1
100 TABLET ORAL EVERY 12 HOURS SCHEDULED
Status: DISCONTINUED | OUTPATIENT
Start: 2021-07-02 | End: 2021-07-02 | Stop reason: HOSPADM

## 2021-07-02 RX ORDER — ACETAMINOPHEN 650 MG
TABLET, EXTENDED RELEASE ORAL 2 TIMES DAILY
Qty: 1 EACH | Refills: 0 | Status: SHIPPED | OUTPATIENT
Start: 2021-07-02 | End: 2021-11-12

## 2021-07-02 RX ORDER — ACETAMINOPHEN 500 MG
500 TABLET ORAL EVERY 4 HOURS PRN
Start: 2021-07-02

## 2021-07-02 RX ORDER — ACETAMINOPHEN 500 MG
500 TABLET ORAL EVERY 4 HOURS PRN
Status: DISCONTINUED | OUTPATIENT
Start: 2021-07-02 | End: 2021-07-02 | Stop reason: HOSPADM

## 2021-07-02 RX ORDER — DOXYCYCLINE 100 MG/1
100 TABLET ORAL EVERY 12 HOURS SCHEDULED
Qty: 28 TABLET | Refills: 0 | Status: SHIPPED | OUTPATIENT
Start: 2021-07-02 | End: 2021-07-16

## 2021-07-02 RX ADMIN — Medication 10 ML: at 08:18

## 2021-07-02 RX ADMIN — ASPIRIN 81 MG: 81 TABLET, COATED ORAL at 08:12

## 2021-07-02 RX ADMIN — MULTIPLE VITAMINS W/ MINERALS TAB 1 TABLET: TAB at 08:12

## 2021-07-02 NOTE — PROGRESS NOTES
Chief Complaint: Sternal Wound Drainage    Subjective:  Resting in bed. Denies complaints    Drips: None    Intake/Output Summary (Last 24 hours) at 7/2/2021 1329  Last data filed at 7/2/2021 1100  Gross per 24 hour   Intake 988 ml   Output 825 ml   Net 163 ml     Temp:  [97.3 °F (36.3 °C)-98.7 °F (37.1 °C)] 98.1 °F (36.7 °C)  Heart Rate:  [53-81] 67  Resp:  [14-20] 14  BP: ()/(51-80) 99/64  Results from last 7 days   Lab Units 07/02/21  0309 07/01/21  1618   WBC 10*3/mm3 5.80 4.00   HEMOGLOBIN g/dL 14.2 14.4   HEMATOCRIT % 41.7 42.4   PLATELETS 10*3/mm3 189 199     Results from last 7 days   Lab Units 07/02/21  0309   CREATININE mg/dL 1.11   POTASSIUM mmol/L 4.0   SODIUM mmol/L 140       Physical Exam:  Neuro intact, nad  Tele:  SR 60's  Diminished bases, room air  Sternal incision with small hold at proximal end, no erythema  Benign abd    Assessment/Plan:  Active Problems:    Drainage from wound    -Sternal Wound Drainage  -CAD s/p CABG x4 with LIMA (3/31/21)-- Smyth  -HTN    Patient afebrile. WBC was normal. CT Chest noted no abscess or fluid collection. Plan to be discharged to home on oral antibiotics today after seen by DOLORES BUSTAMANTE  7/2/2021  13:29 EDT

## 2021-07-02 NOTE — CASE MANAGEMENT/SOCIAL WORK
Discharge Planning Assessment   [facility]     Patient Name: Jean-Claude Clifford Jr.  MRN: [id number]  Today's Date: 7/2/2021    Admit Date: 7/1/2021    Discharge Needs Assessment     Row Name 07/02/21 0908       Living Environment    Lives With  spouse    Current Living Arrangements  home/apartment/condo    Quality of Family Relationships  supportive    Able to Return to Prior Arrangements  yes       Resource/Environmental Concerns    Resource/Environmental Concerns  none    Transportation Concerns  car, none       Transition Planning    Patient/Family Anticipates Transition to  home with help/services    Patient/Family Anticipated Services at Transition  none    Transportation Anticipated  car, drives self        Discharge Plan     Row Name 07/02/21 0908       Plan    Plan  D/C Plan : Anticipate home with H/H pending need of home IV antibiotics    Patient/Family in Agreement with Plan  yes    Plan Comments  Barrier t D/C : Pt is 13 weeks post op CABG and is having drainage from sternal wound , cults. are pending Pt is on IV Vanc. Confirmed PCP and pharmacy .        Continued Care and Services - Admitted Since 7/1/2021    Coordination has not been started for this encounter.         Demographic Summary     Row Name 07/02/21 0907       General Information    Admission Type  inpatient    Arrived From  physician office    Preferred Language  English     Used During This Interaction  no        Functional Status     Row Name 07/02/21 0908       Functional Status    Usual Activity Tolerance  moderate    Current Activity Tolerance  moderate       Mental Status    General Appearance WDL  WDL       Mental Status Summary    Recent Changes in Mental Status/Cognitive Functioning  no changes                Janeth Varela RN

## 2021-07-02 NOTE — DISCHARGE SUMMARY
Date of Admission: 7/1/2021  Date of Discharge: 7/2/2021    Discharge Diagnosis:   -Sternal Wound Drainage  -CAD s/p CABG x4 with LIMA (3/31/21)-- [facility]  -HTN      Presenting Problem/History of Present Illness  Drainage from wound [T14.8XXA]     Hospital Course  Patient is a 55 y.o. male presented to cardiac surgery clinic with complaints of sternal wound drainage from sternal incision. Patient had undergone CABG with Dr. Smyth on 3/31/21. Patient was admitted to observation for rule out of sternal infection. WBC was normal. CT Chest noted no abscess or fluid collection. Infectious disease was consulted and recommended Doxycycline for 14 days. Patient was cleared for discharge and will be followed in outpatient clinic. He will call our office should the wound develop any redness or increased drainage.    Procedures Performed         Consults:   Consults       Date and Time Order Name Status Description    7/1/2021  2:44 PM Inpatient Infectious Diseases Consult Completed             Pertinent Test Results:    Lab Results   Component Value Date    WBC 5.80 07/02/2021    HGB 14.2 07/02/2021    HCT 41.7 07/02/2021    MCV 93.7 07/02/2021     07/02/2021      Lab Results   Component Value Date    GLUCOSE 89 07/02/2021    CALCIUM 8.9 07/02/2021     07/02/2021    K 4.0 07/02/2021    CO2 26.0 07/02/2021     07/02/2021    BUN 12 07/02/2021    CREATININE 1.11 07/02/2021    EGFRIFNONA 68 07/02/2021    BCR 10.8 07/02/2021    ANIONGAP 8.0 07/02/2021     Lab Results   Component Value Date    INR 1.04 04/01/2021    PROTIME 11.4 04/01/2021         Condition on Discharge: Stable for discharge to home with family    Vital Signs  Temp:  [97.5 °F (36.4 °C)-98.7 °F (37.1 °C)] 98.1 °F (36.7 °C)  Heart Rate:  [53-85] 85  Resp:  [14] 14  BP: ()/(51-80) 106/70      Discharge Disposition  Home or Self Care    Discharge Medications     Discharge Medications        New Medications        Instructions Start Date    acetaminophen 500 MG tablet  Commonly known as: TYLENOL   500 mg, Oral, Every 4 Hours PRN      doxycycline 100 MG tablet  Commonly known as: ADOXA   100 mg, Oral, Every 12 Hours Scheduled      povidone-iodine 10 % external solution  Commonly known as: Betadine   Topical, 2 Times Daily, Clean wound then paint with Betadine twice daily             Continue These Medications        Instructions Start Date   aspirin 81 MG EC tablet   81 mg, Oral, Daily      atorvastatin 40 MG tablet  Commonly known as: LIPITOR   40 mg, Oral, Nightly      clopidogrel 75 MG tablet  Commonly known as: PLAVIX   75 mg, Oral, Daily      metoprolol tartrate 25 MG tablet  Commonly known as: LOPRESSOR   12.5 mg, Oral, Every 12 Hours Scheduled      multivitamin with minerals tablet tablet   1 tablet, Oral, Daily               Discharge Diet:   Healthy Heart Diet    Activity at Discharge:   As tolerated, no lifting > 10 pounds x 6 weeks    Follow-up Appointments  Future Appointments   Date Time Provider Department Center   11/12/2021 10:15 AM Félix Taylor MD [facility]     Additional Instructions for the Follow-ups that You Need to Schedule       Discharge Follow-up with Specified Provider: Cardiac Surgery; 2 Weeks   As directed      To: Cardiac Surgery    Follow Up: 2 Weeks    Follow Up Details: Follow up with [facility] on 7/12/21 at 12pm         Call MD With Problems / Concerns   Jul 03, 2021      Instructions: Call for temp >101 or surgical wound drainage    Order Comments: Instructions: Call for temp >101 or surgical wound drainage                  Test Results Pending at Discharge  Pending Labs       Order Current Status    Blood Culture - Blood, Arm, Right In process    Blood Culture - Blood, Hand, Right In process    Wound Culture - Wound, Chest Preliminary result          STSinfomation:  Patient discharged on aspirin, statin, and beta blocker.       HERBIE DAWKINS, DOLORES  07/02/21  15:29 EDT  .

## 2021-07-02 NOTE — CONSULTS
Infectious Diseases Consult Note    Referring Provider: Axel Smyth MD    Reason for Consultation: Sternal drainage    Patient Care Team:  David Vallejo Sr., MD as PCP - General    Chief complaint drainage from the sternal incision    Subjective     The patient has been afebrile for the last 24 hours.  The patient is on room air, hemodynamically stable, and is tolerating antimicrobial therapy.    History of present illness:      This is a 55-year-old male who presents the hospital on 7/1/2021 with complaints of drainage from his sternal incision.  Patient originally had a CABG x4 on 3/31/2021 had been doing very well.  Patient states that the day before he had been taking out the trash and noticed some drainage from the upper portion of his sternal incision.  Patient states he felt some swelling in the upper area of his chest and pushed on this and some yellow pus drained which then became clear which then became blood.  Patient was seen in the office the cardiothoracic surgeon and apparently some drainage was also able to be palpated out of the upper incision.  Currently there is no swelling, fluctuance, erythema, or drainage seen at the sternal incision site.  The entire site has healed except for 1 very small area at the upper portion of the incision that is now scabbed over.  Patient denies fever, chills, diaphoresis, shortness of breath, cough, GI symptoms, urinary symptoms.    Patient is currently on room air and does not appear to be in acute distress.  Patient's been afebrile since admission.  Patient has a creatinine of 1.11, white blood cell count 5.8, hemoglobin 14.2, platelets of 189.  Blood cultures are negative so far, MRSA of the nares screen is negative, blood cultures are pending but negative so far and COVID-19 screen is negative.  Patient had a CT of the chest that did not show any signs of abscess and just some postoperative fat stranding.  Patient received a dose of IV vancomycin has no  known allergies    Along with above-mentioned history patient has a history of anxiety, depression, migraines, cardiac catheterization, cholecystectomy and previous foot knee and no surgery.  Patient denies tobacco, alcohol, illicit drug abuse.    Review of Systems   Review of Systems   Constitutional: Negative.    HENT: Negative.    Eyes: Negative.    Respiratory: Negative.    Cardiovascular: Negative.    Gastrointestinal: Negative.    Endocrine: Negative.    Genitourinary: Negative.    Musculoskeletal: Negative.    Skin: Positive for wound.   Neurological: Negative.    Psychiatric/Behavioral: Negative.    All other systems reviewed and are negative.      Medications  Medications Prior to Admission   Medication Sig Dispense Refill Last Dose   • aspirin 81 MG EC tablet Take 81 mg by mouth Daily.   7/1/2021 at Unknown time   • atorvastatin (LIPITOR) 40 MG tablet Take 1 tablet by mouth Every Night. 60 tablet 5 6/30/2021 at Unknown time   • clopidogrel (PLAVIX) 75 MG tablet Take 1 tablet by mouth Daily. 30 tablet 5 7/1/2021 at Unknown time   • metoprolol tartrate (LOPRESSOR) 25 MG tablet Take 0.5 tablets by mouth Every 12 (Twelve) Hours. 30 tablet 5 7/1/2021 at Unknown time   • Multiple Vitamins-Minerals (MULTIVITAMIN ADULTS 50+ PO) Take 1 tablet by mouth Daily.   7/1/2021 at Unknown time       History  Past Medical History:   Diagnosis Date   • Anxiety    • Coronary artery disease    • Depression    • Migraines    • Mild cognitive impairment      Past Surgical History:   Procedure Laterality Date   • CARDIAC CATHETERIZATION N/A 3/30/2021    Procedure: Left Heart Cath;  Surgeon: Félix Taylor MD;  Location: [facility] CATH INVASIVE LOCATION;  Service: Cardiovascular;  Laterality: N/A;   • CHOLECYSTECTOMY     • CORONARY ARTERY BYPASS GRAFT N/A 3/31/2021    Procedure: CORONARY ARTERY BYPASS GRAFTING;  Surgeon: Axel Smyth MD;  Location: [facility] CVOR;  Service: Cardiothoracic;  Laterality: N/A;   • FOOT SURGERY     •  KNEE ARTHROSCOPY     • NOSE SURGERY         Family History  Family History   Problem Relation Age of Onset   • Heart attack Father    • Heart disease Father        Social History   reports that he has never smoked. He has never used smokeless tobacco. He reports that he does not drink alcohol and does not use drugs.    Allergies  Shellfish allergy    Objective     Vital Signs   Vital Signs (last 24 hours)       07/01 0700  -  07/02 0659 07/02 0700  -  07/02 1508   Most Recent    Temp (°F) 97.8 -  98.7    97.5 -  98.1     98.1 (36.7)    Heart Rate 55 -  72    53 -  85     85    Resp 14 -  15       14    BP 92/61 -  122/71    96/65 -  113/80     106/70    SpO2 (%) 94 -  100    94 -  97     97          Physical Exam:  Physical Exam  Vitals and nursing note reviewed.   Constitutional:       General: He is not in acute distress.     Appearance: Normal appearance. He is well-developed and normal weight. He is not diaphoretic.   HENT:      Head: Normocephalic and atraumatic.   Eyes:      Conjunctiva/sclera: Conjunctivae normal.      Pupils: Pupils are equal, round, and reactive to light.   Cardiovascular:      Rate and Rhythm: Normal rate and regular rhythm.      Heart sounds: Normal heart sounds, S1 normal and S2 normal.   Pulmonary:      Effort: Pulmonary effort is normal. No respiratory distress.      Breath sounds: Normal breath sounds. No stridor. No wheezing or rales.   Abdominal:      General: Bowel sounds are normal. There is no distension.      Palpations: Abdomen is soft. There is no mass.      Tenderness: There is no abdominal tenderness. There is no guarding.   Musculoskeletal:         General: No deformity. Normal range of motion.      Cervical back: Neck supple.   Skin:     General: Skin is warm and dry.      Coloration: Skin is not pale.      Findings: No erythema or rash.      Comments: Majority of the and sternal incision appears to have healed nicely with a very small scabbed area in the upper portion of  the sternal incision.  Currently there is no fluctuance, erythema, or warmth or drainage.   Neurological:      Mental Status: He is alert and oriented to person, place, and time.      Cranial Nerves: No cranial nerve deficit.   Psychiatric:         Mood and Affect: Mood normal.         Microbiology  Microbiology Results (last 10 days)     Procedure Component Value - Date/Time    MRSA Screen, PCR (Inpatient) - Swab, Nares [[id number]]  (Normal) Collected: 07/01/21 1557    Lab Status: Final result Specimen: Swab from Nares Updated: 07/01/21 1743     MRSA PCR No MRSA Detected    Wound Culture - Wound, Chest [[id number]] Collected: 07/01/21 1554    Lab Status: Preliminary result Specimen: Wound from Chest Updated: 07/02/21 0952     Wound Culture No growth     Gram Stain Few (2+) WBCs seen      No organisms seen          Laboratory  Results from last 7 days   Lab Units 07/02/21  0309   WBC 10*3/mm3 5.80   HEMOGLOBIN g/dL 14.2   HEMATOCRIT % 41.7   PLATELETS 10*3/mm3 189     Results from last 7 days   Lab Units 07/02/21  0309   SODIUM mmol/L 140   POTASSIUM mmol/L 4.0   CHLORIDE mmol/L 106   CO2 mmol/L 26.0   BUN mg/dL 12   CREATININE mg/dL 1.11   GLUCOSE mg/dL 89   CALCIUM mg/dL 8.9     Results from last 7 days   Lab Units 07/02/21  0309   SODIUM mmol/L 140   POTASSIUM mmol/L 4.0   CHLORIDE mmol/L 106   CO2 mmol/L 26.0   BUN mg/dL 12   CREATININE mg/dL 1.11   GLUCOSE mg/dL 89   CALCIUM mg/dL 8.9         Results from last 7 days   Lab Units 07/02/21  0309   SED RATE mm/hr 3           Radiology  Imaging Results (Last 72 Hours)     Procedure Component Value Units Date/Time    CT Chest Without Contrast Diagnostic [[id number]] Collected: 07/01/21 2230     Updated: 07/01/21 2237    Narrative:         DATE OF EXAM:  7/1/2021 6:40 PM     PROCEDURE:  CT CHEST WO CONTRAST DIAGNOSTIC-     INDICATIONS:   External drainage for 2 weeks status post CABG.     COMPARISON:   Chest radiograph 04/03/2021. CT abdomen pelvis 07/30/2015.      TECHNIQUE:  Routine transaxial slices were obtained through the chest without the  administration of intravenous contrast. Reconstructed coronal and  sagittal images were also obtained. Automated exposure control and  iterative construction methods were used.     FINDINGS:  Multifocal linear subsegmental atelectasis and/or scarring in the left  upper lobe and lingula. Calcified granuloma in the right upper lobe.  Lungs otherwise clear. The central airways are widely patent. No  pneumothorax or pleural effusion.     The heart and great vessels of the chest are normal in size. Calcified  coronary artery disease with postoperative changes from CABG. No  pericardial effusion. Mild likely postoperative fat stranding in the  anterior mediastinum. No loculated fluid collection is seen to suggest  abscess. Calcified remnants of prior granulomatous disease in the  mediastinum and right hilum. No pathologically enlarged intrathoracic  lymph nodes.     Postoperative changes from median sternotomy with mild fat stranding in  the subcutaneous fat. Again, no loculated fluid collection is seen to  suggest abscess. No acute osseous abnormality or concerning osseous  lesion. Mild bilateral gynecomastia.        Impression:      Postoperative changes in median sternotomy and CABG with likely  postoperative fat stranding in the anterior mediastinum and subcutaneous  fat. No loculated fluid collection is seen to suggest abscess.     Electronically Signed By-Nino yL MD On:7/1/2021 10:34 PM  This report was finalized on [id number] by  Nino Ly MD.          Cardiology      Results Review:  I have reviewed all clinical data, test, lab, and imaging results.       Schedule Meds  aspirin, 81 mg, Oral, Daily  atorvastatin, 40 mg, Oral, Nightly  doxycycline, 100 mg, Oral, Q12H  metoprolol tartrate, 12.5 mg, Oral, Q12H  multivitamin with minerals, 1 tablet, Oral, Daily  sodium chloride, 10 mL, Intravenous, Q12H        Infusion  Meds       PRN Meds  •  acetaminophen **OR** acetaminophen  •  ondansetron  •  sodium chloride      Assessment/Plan       Assessment    Sternal wound drainage.  Patient states that several days ago he was taking out the trash and noticed a little bit of drainage from the upper portion of the incision site-when he pressed on the area some yellow drainage came out that then became clear that then became bloody.  Apparently there is more drainage that was expressed at the cardiothoracic surgeons office so he was sent to the hospital on 7/1/2021.  -Wound cultures are negative so far and patient denies any systemic symptoms of infection.  -Patient's been afebrile and has a normal white blood cell count  -CT of the chest did not show any signs of abscess or significant infection    History of CABG x4 on 3/21/2021    Plan    Start p.o. doxycycline 100 mg twice daily for 14 days  Continue supportive care  Okay to discharge from Infectious Disease standpoint  Case discussed with cardiothoracic surgery NP  Case discussed with KP Goodrich, DOLORES  07/02/21  15:08 EDT

## 2021-07-02 NOTE — SIGNIFICANT NOTE
07/02/21 1600   Discharge of Care   Discharge Mode ambulatory   Discharged Accompanied by self only   Discharge Contact Information if Applicable Flory (spouse) [phone]   Discharge Teaching Done  Yes   Learning Method Written Materials;Explanation   All discharge information gone over with patient and questions answered at this time. Patient refused wheel chair to vehicle.

## 2021-07-03 NOTE — OUTREACH NOTE
Prep Survey      Responses   [facility] facility patient discharged from?  [facility]   Is LACE score < 7 ?  Yes   Emergency Room discharge w/ pulse ox?  No   Eligibility  TCM   Hospital  [facility]   Date of Admission  07/01/21   Date of Discharge  07/02/21   Discharge Disposition  Home or Self Care   Discharge diagnosis  Sternal wound drainage   Does the patient have one of the following disease processes/diagnoses(primary or secondary)?  Other   Does the patient have Home health ordered?  No   Is there a DME ordered?  No   Prep survey completed?  Yes          Linnea Mcdermott RN

## 2021-07-04 LAB
BACTERIA SPEC AEROBE CULT: NORMAL
GRAM STN SPEC: NORMAL
GRAM STN SPEC: NORMAL

## 2021-07-04 NOTE — CASE MANAGEMENT/SOCIAL WORK
Case Management Discharge Note      Final Note: Home on oral abx         Selected Continued Care - Discharged on 7/2/2021 Admission date: 7/1/2021 - Discharge disposition: Home or Self Care   Final Discharge Disposition Code: 01 - home or self-care

## 2021-07-05 ENCOUNTER — TRANSITIONAL CARE MANAGEMENT TELEPHONE ENCOUNTER (OUTPATIENT)
Dept: CALL CENTER | Facility: HOSPITAL | Age: 56
End: 2021-07-05

## 2021-07-05 NOTE — OUTREACH NOTE
Call Center TCM Note      Responses   [facility] patient discharged from?  [facility]   Does the patient have one of the following disease processes/diagnoses(primary or secondary)?  Other   TCM attempt successful?  Yes   Call start time  1232   Call end time  1238   Discharge diagnosis  Sternal wound drainage (previous CABG 3/31/21)   Is patient permission given to speak with other caregiver?  No   Meds reviewed with patient/caregiver?  Yes   Is the patient having any side effects they believe may be caused by any medication additions or changes?  No   Does the patient have all medications ordered at discharge?  Yes   Is the patient taking all medications as directed (includes completed medication regime)?  Yes   Does the patient have a primary care provider?   Yes   Does the patient have an appointment with their PCP within 7 days of discharge?  No   Comments regarding PCP  PCP Dr David Vallejo. Patient declines to schedule PCP follow up appt with call today. Patient will be following with Cardiac surgery .    What is preventing the patient from scheduling follow up appointments within 7 days of discharge?  -- [Patient states will see prn. ]   Has the patient kept scheduled appointments due by today?  N/A   Has home health visited the patient within 72 hours of discharge?  N/A   Psychosocial issues?  No   Comments  Patient to paint wound with betadine twice a day.    Did the patient receive a copy of their discharge instructions?  Yes   Nursing interventions  Reviewed instructions with patient [NO tub bath. Monitor for fever. ]   What is the patient's perception of their health status since discharge?  Improving [Patient reports small amount of drainage Saturday morning, the day after discharge, but none since. ]   Is the patient/caregiver able to teach back signs and symptoms related to disease process for when to call PCP?  Yes   Is the patient/caregiver able to teach back the hierarchy of who to call/visit for  symptoms/problems? PCP, Specialist, Home health nurse, Urgent Care, ED, 911  Yes   If the patient is a current smoker, are they able to teach back resources for cessation?  Not a smoker   TCM call completed?  Yes   Wrap up additional comments  Patient denies any further questions or needs today.           Alma Delia Harvey RN    7/5/2021, 12:38 EDT

## 2021-07-06 LAB
BACTERIA SPEC AEROBE CULT: NORMAL
BACTERIA SPEC AEROBE CULT: NORMAL

## 2021-07-12 ENCOUNTER — OFFICE VISIT (OUTPATIENT)
Dept: CARDIAC SURGERY | Facility: CLINIC | Age: 56
End: 2021-07-12

## 2021-07-12 VITALS
TEMPERATURE: 97.5 F | DIASTOLIC BLOOD PRESSURE: 66 MMHG | WEIGHT: 177.5 LBS | SYSTOLIC BLOOD PRESSURE: 98 MMHG | RESPIRATION RATE: 20 BRPM | OXYGEN SATURATION: 98 % | HEIGHT: 69 IN | HEART RATE: 62 BPM | BODY MASS INDEX: 26.29 KG/M2

## 2021-07-12 DIAGNOSIS — Z09 POSTOPERATIVE FOLLOW-UP: ICD-10-CM

## 2021-07-12 DIAGNOSIS — L24.A9 DRAINAGE FROM WOUND: Primary | ICD-10-CM

## 2021-07-12 PROCEDURE — 99024 POSTOP FOLLOW-UP VISIT: CPT | Performed by: NURSE PRACTITIONER

## 2021-07-12 NOTE — PROGRESS NOTES
"CARDIOVASCULAR SURGERY FOLLOW-UP PROGRESS NOTE  Chief Complaint: Post-op Follow Up        HPI:   Dear Dr. Vallejo, David AVILES Sr., MD and colleagues:    It was nice to see Jean-Claude Candelariodner  in follow up today after cardiac surgery.  As you know, he is a 55 y.o. male with CAD who underwent CABG at [facility] by Dr. Axel Smyth on 3/31/21. He did well postoperatively but represented to clinic with sternal wound drainage. He was admitted for further up and infectious disease evaluation. He comes in today complaining of nothing.  His activity level has been good. His sternal incision looks well healed without redness or drainage. I advised him to avoid submerging his incision for one month but is otherwise without restrictions. He will call our office if the wound again develops redness or drainage. We will follow with him as needed.    Physical Exam:         BP 98/66 (BP Location: Left arm, Patient Position: Sitting, Cuff Size: Adult)   Pulse 62   Temp 97.5 °F (36.4 °C) (Oral)   Resp 20   Ht 174 cm (68.5\")   Wt 80.5 kg (177 lb 8 oz)   SpO2 98%   BMI 26.60 kg/m²   Heart:  regular rate and rhythm, S1, S2 normal, no murmur, click, rub or gallop  Lungs:  clear to auscultation bilaterally  Extremities:  no edema  Incision(s):  mid chest healing well, no significant drainage, no dehiscence, no significant erythema    Assessment/Plan:     S/P CABG. Overall, he is doing well.    Post-op wound drainage    Follow-up as scheduled with cardiology  Follow-up with CT surgery prn    No restrictions of activity.      Thank you for allowing me to participate in the care of your patient.    Regards,  DOLORES GARCIA      "

## 2021-08-23 ENCOUNTER — APPOINTMENT (OUTPATIENT)
Dept: GENERAL RADIOLOGY | Facility: HOSPITAL | Age: 56
End: 2021-08-23

## 2021-08-23 ENCOUNTER — HOSPITAL ENCOUNTER (EMERGENCY)
Facility: HOSPITAL | Age: 56
Discharge: HOME | End: 2021-08-23
Attending: EMERGENCY MEDICINE | Admitting: EMERGENCY MEDICINE

## 2021-08-23 ENCOUNTER — TELEPHONE (OUTPATIENT)
Dept: FAMILY MEDICINE CLINIC | Facility: CLINIC | Age: 56
End: 2021-08-23

## 2021-08-23 VITALS
HEART RATE: 96 BPM | HEIGHT: 68 IN | OXYGEN SATURATION: 96 % | SYSTOLIC BLOOD PRESSURE: 107 MMHG | TEMPERATURE: 99.6 F | BODY MASS INDEX: 25.82 KG/M2 | WEIGHT: 170.4 LBS | RESPIRATION RATE: 20 BRPM | DIASTOLIC BLOOD PRESSURE: 69 MMHG

## 2021-08-23 DIAGNOSIS — J18.9 PNEUMONIA OF LEFT LUNG DUE TO INFECTIOUS ORGANISM, UNSPECIFIED PART OF LUNG: ICD-10-CM

## 2021-08-23 DIAGNOSIS — U07.1 COVID-19: Primary | ICD-10-CM

## 2021-08-23 LAB
ALBUMIN SERPL-MCNC: 4.1 G/DL (ref 3.5–5.2)
ALBUMIN/GLOB SERPL: 1.4 G/DL
ALP SERPL-CCNC: 105 U/L (ref 39–117)
ALT SERPL W P-5'-P-CCNC: 46 U/L (ref 1–41)
ANION GAP SERPL CALCULATED.3IONS-SCNC: 10 MMOL/L (ref 5–15)
AST SERPL-CCNC: 56 U/L (ref 1–40)
BASOPHILS # BLD AUTO: 0 10*3/MM3 (ref 0–0.2)
BASOPHILS NFR BLD AUTO: 1.3 % (ref 0–1.5)
BILIRUB SERPL-MCNC: 0.5 MG/DL (ref 0–1.2)
BUN SERPL-MCNC: 17 MG/DL (ref 6–20)
BUN/CREAT SERPL: 15.3 (ref 7–25)
CALCIUM SPEC-SCNC: 8.6 MG/DL (ref 8.6–10.5)
CHLORIDE SERPL-SCNC: 102 MMOL/L (ref 98–107)
CO2 SERPL-SCNC: 24 MMOL/L (ref 22–29)
CREAT SERPL-MCNC: 1.11 MG/DL (ref 0.76–1.27)
DEPRECATED RDW RBC AUTO: 45.1 FL (ref 37–54)
EOSINOPHIL # BLD AUTO: 0 10*3/MM3 (ref 0–0.4)
EOSINOPHIL NFR BLD AUTO: 0.1 % (ref 0.3–6.2)
ERYTHROCYTE [DISTWIDTH] IN BLOOD BY AUTOMATED COUNT: 14 % (ref 12.3–15.4)
GFR SERPL CREATININE-BSD FRML MDRD: 68 ML/MIN/1.73
GLOBULIN UR ELPH-MCNC: 3 GM/DL
GLUCOSE SERPL-MCNC: 100 MG/DL (ref 65–99)
HCT VFR BLD AUTO: 47.9 % (ref 37.5–51)
HGB BLD-MCNC: 16.3 G/DL (ref 13–17.7)
HOLD SPECIMEN: NORMAL
LYMPHOCYTES # BLD AUTO: 0.7 10*3/MM3 (ref 0.7–3.1)
LYMPHOCYTES NFR BLD AUTO: 27.6 % (ref 19.6–45.3)
MCH RBC QN AUTO: 31.2 PG (ref 26.6–33)
MCHC RBC AUTO-ENTMCNC: 33.9 G/DL (ref 31.5–35.7)
MCV RBC AUTO: 92 FL (ref 79–97)
MONOCYTES # BLD AUTO: 0.3 10*3/MM3 (ref 0.1–0.9)
MONOCYTES NFR BLD AUTO: 12 % (ref 5–12)
NEUTROPHILS NFR BLD AUTO: 1.5 10*3/MM3 (ref 1.7–7)
NEUTROPHILS NFR BLD AUTO: 59 % (ref 42.7–76)
NRBC BLD AUTO-RTO: 0.2 /100 WBC (ref 0–0.2)
PLATELET # BLD AUTO: 108 10*3/MM3 (ref 140–450)
PMV BLD AUTO: 7.4 FL (ref 6–12)
POTASSIUM SERPL-SCNC: 4.3 MMOL/L (ref 3.5–5.2)
PROT SERPL-MCNC: 7.1 G/DL (ref 6–8.5)
QT INTERVAL: 308 MS
RBC # BLD AUTO: 5.21 10*6/MM3 (ref 4.14–5.8)
SARS-COV-2 RNA PNL SPEC NAA+PROBE: DETECTED
SODIUM SERPL-SCNC: 136 MMOL/L (ref 136–145)
TROPONIN T SERPL-MCNC: <0.01 NG/ML (ref 0–0.03)
WBC # BLD AUTO: 2.6 10*3/MM3 (ref 3.4–10.8)

## 2021-08-23 PROCEDURE — 87635 SARS-COV-2 COVID-19 AMP PRB: CPT | Performed by: EMERGENCY MEDICINE

## 2021-08-23 PROCEDURE — 99283 EMERGENCY DEPT VISIT LOW MDM: CPT

## 2021-08-23 PROCEDURE — 25010000006 INJECTION, CASIRIVIMAB AND IMDEVIMAB, 1200 MG: Performed by: EMERGENCY MEDICINE

## 2021-08-23 PROCEDURE — 93005 ELECTROCARDIOGRAM TRACING: CPT | Performed by: EMERGENCY MEDICINE

## 2021-08-23 PROCEDURE — 84484 ASSAY OF TROPONIN QUANT: CPT | Performed by: EMERGENCY MEDICINE

## 2021-08-23 PROCEDURE — 85025 COMPLETE CBC W/AUTO DIFF WBC: CPT | Performed by: EMERGENCY MEDICINE

## 2021-08-23 PROCEDURE — 80053 COMPREHEN METABOLIC PANEL: CPT | Performed by: EMERGENCY MEDICINE

## 2021-08-23 PROCEDURE — 71045 X-RAY EXAM CHEST 1 VIEW: CPT

## 2021-08-23 PROCEDURE — M0243 CASIRIVI AND IMDEVI INFUSION: HCPCS | Performed by: EMERGENCY MEDICINE

## 2021-08-23 RX ORDER — DIPHENHYDRAMINE HYDROCHLORIDE 50 MG/ML
50 INJECTION INTRAMUSCULAR; INTRAVENOUS ONCE AS NEEDED
Status: DISCONTINUED | OUTPATIENT
Start: 2021-08-23 | End: 2021-08-23 | Stop reason: HOSPADM

## 2021-08-23 RX ORDER — METHYLPREDNISOLONE SODIUM SUCCINATE 125 MG/2ML
125 INJECTION, POWDER, LYOPHILIZED, FOR SOLUTION INTRAMUSCULAR; INTRAVENOUS ONCE AS NEEDED
Status: DISCONTINUED | OUTPATIENT
Start: 2021-08-23 | End: 2021-08-23 | Stop reason: HOSPADM

## 2021-08-23 RX ORDER — SODIUM CHLORIDE 0.9 % (FLUSH) 0.9 %
10 SYRINGE (ML) INJECTION AS NEEDED
Status: DISCONTINUED | OUTPATIENT
Start: 2021-08-23 | End: 2021-08-23 | Stop reason: HOSPADM

## 2021-08-23 RX ORDER — SODIUM CHLORIDE 9 MG/ML
30 INJECTION, SOLUTION INTRAVENOUS ONCE
Status: COMPLETED | OUTPATIENT
Start: 2021-08-23 | End: 2021-08-23

## 2021-08-23 RX ORDER — EPINEPHRINE 1 MG/ML
0.3 INJECTION, SOLUTION, CONCENTRATE INTRAVENOUS ONCE AS NEEDED
Status: DISCONTINUED | OUTPATIENT
Start: 2021-08-23 | End: 2021-08-23 | Stop reason: HOSPADM

## 2021-08-23 RX ADMIN — CASIRIVIMAB AND IMDEVIMAB: 600; 600 INJECTION, SOLUTION, CONCENTRATE INTRAVENOUS at 10:38

## 2021-08-23 RX ADMIN — SODIUM CHLORIDE 500 ML: 9 INJECTION, SOLUTION INTRAVENOUS at 08:49

## 2021-08-23 RX ADMIN — SODIUM CHLORIDE 30 ML: 9 INJECTION, SOLUTION INTRAVENOUS at 10:38

## 2021-08-23 NOTE — TELEPHONE ENCOUNTER
Caller: Jean-Claude Clifford Jr.    Relationship to patient: Self    Best call back number: [phone]    Chief complaint: PATIENT CALLED STATING HE WAS CONCERNED THAT HE HAS COVID BUT WAS UNDERSURE.PATIENT WAS REQUESTING AN APPOINTMENT WITH HIS PCP. WHEN ASKED WHAT PATIENT SYMPTOMS WHERE, PATIENT STATED HE WAS HAVING LOSS OF TASTE AND SMELL, AND CHEST PAIN ALONG WITH SOA. HUB INFORMED PATIENT THAT DUE TO SYMPTOMS PATIENT SHOULD GO TO THE ER. PATIENT VERBALIZED UNDERSTANDING.     Patient directed to call 911 or go to their nearest emergency room.     Patient verbalized understanding: [x] Yes  [] No

## 2021-08-23 NOTE — ED PROVIDER NOTES
Subjective   Chief complaint Covid symptoms    History present illness a 55-year-old male history of heart disease with bypass surgery who states that he was exposed to his daughter as Covid last week.  He states that since last Monday has had some achiness generally ill feeling cough congestion and now some shortness of breath fever and chills.  He has some tightness in his chest but no neck arm or jaw pain.  No vomiting or diarrhea.  No tick bites or rashes.  Symptoms are moderate worse with activity better with rest ongoing continuous since last Monday.  Patient called his primary care doctor today and they told to go to the ER.  Patient is not vaccinated          Review of Systems   Constitutional: Positive for chills, fatigue and fever.   HENT: Positive for congestion and sinus pressure.    Eyes: Negative for photophobia and visual disturbance.   Respiratory: Positive for cough, chest tightness and shortness of breath.    Cardiovascular: Negative for chest pain and leg swelling.   Gastrointestinal: Negative for abdominal pain and vomiting.   Endocrine: Negative for cold intolerance and heat intolerance.   Genitourinary: Negative for difficulty urinating and dysuria.   Musculoskeletal: Negative for arthralgias and back pain.   Skin: Negative for color change and pallor.   Neurological: Positive for weakness and headaches. Negative for dizziness.   Psychiatric/Behavioral: Negative for agitation and behavioral problems.       Past Medical History:   Diagnosis Date   • Anxiety    • Coronary artery disease    • Depression    • Migraines    • Mild cognitive impairment        Allergies   Allergen Reactions   • Shellfish Allergy Anaphylaxis       Past Surgical History:   Procedure Laterality Date   • CARDIAC CATHETERIZATION N/A 3/30/2021    Procedure: Left Heart Cath;  Surgeon: Félix Taylor MD;  Location: [facility] CATH INVASIVE LOCATION;  Service: Cardiovascular;  Laterality: N/A;   • CHOLECYSTECTOMY     • CORONARY  ARTERY BYPASS GRAFT N/A 3/31/2021    Procedure: CORONARY ARTERY BYPASS GRAFTING;  Surgeon: Axel Smyth MD;  Location: [facility];  Service: Cardiothoracic;  Laterality: N/A;   • FOOT SURGERY     • KNEE ARTHROSCOPY     • NOSE SURGERY         Family History   Problem Relation Age of Onset   • Heart attack Father    • Heart disease Father        Social History     Socioeconomic History   • Marital status:      Spouse name: Not on file   • Number of children: Not on file   • Years of education: Not on file   • Highest education level: Not on file   Tobacco Use   • Smoking status: Never Smoker   • Smokeless tobacco: Never Used   Vaping Use   • Vaping Use: Never used   Substance and Sexual Activity   • Alcohol use: No   • Drug use: Never   • Sexual activity: Yes     Partners: Female     Prior to Admission medications    Medication Sig Start Date End Date Taking? Authorizing Provider   acetaminophen (TYLENOL) 500 MG tablet Take 1 tablet by mouth Every 4 (Four) Hours As Needed for Fever (fever greater than 101.5 F or headache). 7/2/21   Amina Will APRN   aspirin 81 MG EC tablet Take 81 mg by mouth Daily.    Provider, MD Dom   atorvastatin (LIPITOR) 40 MG tablet Take 1 tablet by mouth Every Night. 4/4/21   Jr Sravan Bolivar MD   clopidogrel (PLAVIX) 75 MG tablet Take 1 tablet by mouth Daily. 4/5/21   Jr Sravan Bolivar MD   metoprolol tartrate (LOPRESSOR) 25 MG tablet Take 0.5 tablets by mouth Every 12 (Twelve) Hours. 4/4/21   Jr Sravan Bolivar MD   Multiple Vitamins-Minerals (MULTIVITAMIN ADULTS 50+ PO) Take 1 tablet by mouth Daily.    Emergency, Nurse Epic, RN   povidone-iodine (Betadine) 10 % external solution Apply  topically to the appropriate area as directed 2 (Two) Times a Day. Clean wound then paint with Betadine twice daily 7/2/21   Amina Will APRN           Objective   Physical Exam  55-year-old male awake alert no acute distress sats 97% on room air  HEENT  extraocular muscles are intact pupils equal and reactive there is no photophobia.  Neck supple no adenopathy no meningeal signs no JVD  Lungs few rhonchi in the bases no retraction  Heart regular without murmur  Abdomen soft without tenderness good bowel sounds no peritoneal findings or pulsatile mass or bruits  Extremities pulses are equal throughout upper and lower extremities no edema cords or Homans' sign or evidence of DVT.  Skin warm dry there is no rashes.  Neurologic awake alert follows commands motor strength normal no facial asymmetry normal speech no focal weakness  Procedures           ED Course      Results for orders placed or performed during the hospital encounter of 08/23/21   COVID-19,CEPHEID/ARNALDO/BDMAX,COR/SHAYE/PAD/JENNIE IN-HOUSE(OR EMERGENT/ADD-ON),NP SWAB IN TRANSPORT MEDIA 3-4 HR TAT, RT-PCR - Swab, Nasopharynx    Specimen: Nasopharynx; Swab   Result Value Ref Range    COVID19 Detected (C) Not Detected - Ref. Range   Comprehensive Metabolic Panel    Specimen: Blood   Result Value Ref Range    Glucose 100 (H) 65 - 99 mg/dL    BUN 17 6 - 20 mg/dL    Creatinine 1.11 0.76 - 1.27 mg/dL    Sodium 136 136 - 145 mmol/L    Potassium 4.3 3.5 - 5.2 mmol/L    Chloride 102 98 - 107 mmol/L    CO2 24.0 22.0 - 29.0 mmol/L    Calcium 8.6 8.6 - 10.5 mg/dL    Total Protein 7.1 6.0 - 8.5 g/dL    Albumin 4.10 3.50 - 5.20 g/dL    ALT (SGPT) 46 (H) 1 - 41 U/L    AST (SGOT) 56 (H) 1 - 40 U/L    Alkaline Phosphatase 105 39 - 117 U/L    Total Bilirubin 0.5 0.0 - 1.2 mg/dL    eGFR Non African Amer 68 >60 mL/min/1.73    Globulin 3.0 gm/dL    A/G Ratio 1.4 g/dL    BUN/Creatinine Ratio 15.3 7.0 - 25.0    Anion Gap 10.0 5.0 - 15.0 mmol/L   Troponin    Specimen: Blood   Result Value Ref Range    Troponin T <0.010 0.000 - 0.030 ng/mL   CBC Auto Differential    Specimen: Blood   Result Value Ref Range    WBC 2.60 (L) 3.40 - 10.80 10*3/mm3    RBC 5.21 4.14 - 5.80 10*6/mm3    Hemoglobin 16.3 13.0 - 17.7 g/dL    Hematocrit 47.9 37.5  - 51.0 %    MCV 92.0 79.0 - 97.0 fL    MCH 31.2 26.6 - 33.0 pg    MCHC 33.9 31.5 - 35.7 g/dL    RDW 14.0 12.3 - 15.4 %    RDW-SD 45.1 37.0 - 54.0 fl    MPV 7.4 6.0 - 12.0 fL    Platelets 108 (L) 140 - 450 10*3/mm3    Neutrophil % 59.0 42.7 - 76.0 %    Lymphocyte % 27.6 19.6 - 45.3 %    Monocyte % 12.0 5.0 - 12.0 %    Eosinophil % 0.1 (L) 0.3 - 6.2 %    Basophil % 1.3 0.0 - 1.5 %    Neutrophils, Absolute 1.50 (L) 1.70 - 7.00 10*3/mm3    Lymphocytes, Absolute 0.70 0.70 - 3.10 10*3/mm3    Monocytes, Absolute 0.30 0.10 - 0.90 10*3/mm3    Eosinophils, Absolute 0.00 0.00 - 0.40 10*3/mm3    Basophils, Absolute 0.00 0.00 - 0.20 10*3/mm3    nRBC 0.2 0.0 - 0.2 /100 WBC   ECG 12 Lead   Result Value Ref Range    QT Interval 308 ms   Gold Top - SST   Result Value Ref Range    Extra Tube Hold for add-ons.      XR Chest 1 View    Result Date: 8/23/2021  Patchy left basilar consolidation representing either atelectasis or pneumonia.  Electronically Signed By-Justin Miles MD On:8/23/2021 8:50 AM This report was finalized on [id number] by  Justin Miles MD.    Medications   sodium chloride 0.9 % bolus 500 mL (0 mL Intravenous Stopped 8/23/21 0919)   INV casirivimab / imdevimab 1200 mg in NS 60mL IVPB (single vial) ( Intravenous Stopped 8/23/21 1058)   sodium chloride 0.9 % infusion 30 mL (0 mL Intravenous Stopped 8/23/21 1347)     EKG my interpretation normal sinus rhythm rate 95 normal axis no hypertrophy nonspecific T wave noted diffusely but this is unchanged from previous EKG abnormal      The FDA has authorized the emergency use of casirivimab and imdevimab, which is not an FDA approved drug. Discussions with the patient regarding the risks and benefits of casirivimab and imdevimab have occurred. The patient recognizes that this is an investigational treatment which may offer significant known and potential benefits and risks, the extent of which are unknown. Information on available alternative treatments and the risks and  benefits of those alternatives was discussed. The patient received the “Fact Sheet for Patients Parents and Caregivers”. All questions from the patient were answered to satisfaction. The patient has the option to accept or refuse treatment with casirivimab and imdevimab and would like to accept treatment.    Counseling regarding continued self isolation and use of infection control measures according to the CDC guidelines has occurred.                                  MDM  Number of Diagnoses or Management Options  COVID-19: new and requires workup  Pneumonia of left lung due to infectious organism, unspecified part of lung: new and requires workup  Diagnosis management comments: Medical decision making.  Patient had an IV established placed on a cardiac monitor sats are good in the mid 90s.  He was given IV normal saline 500 cc bolus.  The patient had an EKG which showed a sinus rhythm without acute findings chest x-ray patchy left basilar infiltrate and/or atelectasis.  Laboratory evaluation White count was 2.6 hemoglobin was noted to be 16.3 platelets 106,000.  Chemistries were unremarkable ALT of 46 AST of 56 noted COVID-19 was positive.  Patient remains with good oxygen saturation is nontoxic-appearing is no evidence of acute cardiac ischemia I see no evidence of acute cardiac ischemia or myocardial infarction or DVT or pulmonary embolism or sepsis on clinical exam.  The patient is a good candidate for antibiotic therapy we talked about this risk benefits complications and alternative therapies discussed he voices understanding and was agreeable to antibiotic therapy this was given he tolerated well.  He will be discharged home for outpatient management and quarantine we talked about what to return for and he voices understanding and he was discharged home for outpatient management.  Unremarkable ER course examined appropriate PPE       Amount and/or Complexity of Data Reviewed  Clinical lab tests:  reviewed  Tests in the radiology section of CPT®: reviewed  Tests in the medicine section of CPT®: reviewed  Independent visualization of images, tracings, or specimens: yes    Risk of Complications, Morbidity, and/or Mortality  Presenting problems: high  Diagnostic procedures: high  Management options: high    Patient Progress  Patient progress: stable      Final diagnoses:   COVID-19   Pneumonia of left lung due to infectious organism, unspecified part of lung       ED Disposition  ED Disposition     ED Disposition Condition Comment    Discharge Stable           David Vallejo Sr., MD  [address] [place] [address]  [place] [address]  [phone]    In 1 week           Medication List      No changes were made to your prescriptions during this visit.          Adam Franklin MD  08/23/21 8389

## 2021-08-23 NOTE — DISCHARGE INSTRUCTIONS
Continue quarantine.  Return for increasing shortness of breath uncontrolled fevers unable to hold anything down altered mental status decreasing oxygen saturations or any other new or worse problems or concerns.  Tylenol for pain and fevers.

## 2021-08-23 NOTE — ED NOTES
Pt reports that he was exposed on the 13th to covid. Reports chills, achy, loss of taste and smell, nausea, diarrhea, and  sinus PORTER.     Sonya Hutchins RN  08/23/21 9178

## 2021-09-23 RX ORDER — CLOPIDOGREL BISULFATE 75 MG/1
75 TABLET ORAL DAILY
Qty: 30 TABLET | Refills: 5 | OUTPATIENT
Start: 2021-09-23

## 2021-11-01 ENCOUNTER — TELEPHONE (OUTPATIENT)
Dept: CARDIOLOGY | Facility: CLINIC | Age: 56
End: 2021-11-01

## 2021-11-01 NOTE — TELEPHONE ENCOUNTER
Incoming Refill Request      Medication requested (name and dose): Metoprolol 25, 1/2 tablet bid    Pharmacy where request should be sent: [facility] in [place]     Additional details provided by patient: patient is out    Best call back number: [phone]    Does the patient have less than a 3 day supply:  [x] Yes  [] No    Willi Niño Rep  11/01/21, 09:02 EDT

## 2021-11-12 ENCOUNTER — OFFICE VISIT (OUTPATIENT)
Dept: CARDIOLOGY | Facility: CLINIC | Age: 56
End: 2021-11-12

## 2021-11-12 VITALS
OXYGEN SATURATION: 98 % | DIASTOLIC BLOOD PRESSURE: 80 MMHG | SYSTOLIC BLOOD PRESSURE: 120 MMHG | HEIGHT: 68 IN | BODY MASS INDEX: 27.74 KG/M2 | WEIGHT: 183 LBS

## 2021-11-12 DIAGNOSIS — E78.5 DYSLIPIDEMIA: ICD-10-CM

## 2021-11-12 DIAGNOSIS — Z95.1 S/P CABG X 4: ICD-10-CM

## 2021-11-12 DIAGNOSIS — I25.10 CORONARY ARTERY DISEASE INVOLVING NATIVE CORONARY ARTERY OF NATIVE HEART WITHOUT ANGINA PECTORIS: Primary | ICD-10-CM

## 2021-11-12 PROBLEM — Z47.89 ORTHOPEDIC AFTERCARE: Status: RESOLVED | Noted: 2017-09-07 | Resolved: 2021-11-12

## 2021-11-12 PROBLEM — R07.89 OTHER CHEST PAIN: Status: RESOLVED | Noted: 2021-03-06 | Resolved: 2021-11-12

## 2021-11-12 PROBLEM — L24.A9 DRAINAGE FROM WOUND: Status: RESOLVED | Noted: 2021-07-02 | Resolved: 2021-11-12

## 2021-11-12 PROBLEM — T14.8XXA DRAINAGE FROM WOUND: Status: RESOLVED | Noted: 2021-07-02 | Resolved: 2021-11-12

## 2021-11-12 PROCEDURE — 99214 OFFICE O/P EST MOD 30 MIN: CPT | Performed by: NURSE PRACTITIONER

## 2021-11-12 PROCEDURE — 93000 ELECTROCARDIOGRAM COMPLETE: CPT | Performed by: NURSE PRACTITIONER

## 2021-11-13 PROBLEM — E78.5 DYSLIPIDEMIA: Status: ACTIVE | Noted: 2021-11-13

## 2021-11-13 NOTE — PROGRESS NOTES
Cardiology Office Follow Up Visit      Primary Care Provider:  David Vallejo Sr., MD    Reason for f/u:     Coronary artery disease  Previous bypass surgery  Dyslipidemia      Subjective     CC:    Patient reporting volatile emotions and chest pain typically related to stress    History of Present Illness       Jean-Claude Clifford Jr. is a 55 y.o. male.  Patient is a very pleasant 55-year-old male who has a history of coronary artery disease back in 2021 he was evaluated by Dr. Taylor for chest pain.  He underwent stress Myoview which showed inferior wall ischemia.  Subsequent cardiac catheterization through three-vessel coronary artery disease and he underwent four-vessel CABG by Dr. Smyth.    Patient reports he has been under a lot of stress at home.  Apparently his brother recently .  He also reports he has been having some stress induced by some arguments with his son and wife.    The patient is reporting that he is having emotional lability.  He reports he gets tearful at times.  He reports not finding eder in activities that he previously has.    He is concerned about complaints of chest pain.  He says that he has a soreness in his sternum.  He says he feels like it gets worse when he is under stress.    The patient also reports that he has been trying to increase activity.  He reports he has a long incline driveway and will walk loops around this coming of the incline he reports at times he jogs this loop.  He denies any associated chest pain to this activity.  He has had no shortness of breath.  He reports compliance with medical therapy.        Past Medical History:   Diagnosis Date   • Anxiety    • Bradycardia    • Coronary artery disease    • Depression    • Migraines    • Mild cognitive impairment        Past Surgical History:   Procedure Laterality Date   • CARDIAC CATHETERIZATION N/A 3/30/2021    Procedure: Left Heart Cath;  Surgeon: Félix Taylor MD;  Location: [facility] CATH INVASIVE LOCATION;   Service: Cardiovascular;  Laterality: N/A;   • CHOLECYSTECTOMY     • CORONARY ARTERY BYPASS GRAFT N/A 3/31/2021    Procedure: CORONARY ARTERY BYPASS GRAFTING;  Surgeon: Axel Smyth MD;  Location: [facility];  Service: Cardiothoracic;  Laterality: N/A;   • FOOT SURGERY     • KNEE ARTHROSCOPY     • NOSE SURGERY           Current Outpatient Medications:   •  aspirin 81 MG EC tablet, Take 81 mg by mouth Daily., Disp: , Rfl:   •  atorvastatin (LIPITOR) 40 MG tablet, Take 1 tablet by mouth Every Night., Disp: 60 tablet, Rfl: 5  •  clopidogrel (PLAVIX) 75 MG tablet, Take 1 tablet by mouth Daily., Disp: 30 tablet, Rfl: 5  •  metoprolol tartrate (LOPRESSOR) 25 MG tablet, Take 0.5 tablets by mouth Every 12 (Twelve) Hours., Disp: 30 tablet, Rfl: 3  •  Multiple Vitamins-Minerals (MULTIVITAMIN ADULTS 50+ PO), Take 1 tablet by mouth Daily., Disp: , Rfl:   •  acetaminophen (TYLENOL) 500 MG tablet, Take 1 tablet by mouth Every 4 (Four) Hours As Needed for Fever (fever greater than 101.5 F or headache)., Disp: , Rfl:     Social History     Socioeconomic History   • Marital status:    Tobacco Use   • Smoking status: Never Smoker   • Smokeless tobacco: Never Used   Vaping Use   • Vaping Use: Never used   Substance and Sexual Activity   • Alcohol use: No   • Drug use: Never   • Sexual activity: Yes     Partners: Female       Family History   Problem Relation Age of Onset   • Heart attack Father    • Heart disease Father        The following portions of the patient's history were reviewed and updated as appropriate: allergies, current medications, past family history, past medical history, past social history, past surgical history and problem list.    Review of Systems   Constitutional: Negative for decreased appetite and diaphoresis.   HENT: Negative for congestion, hearing loss and nosebleeds.    Cardiovascular: Positive for chest pain. Negative for claudication, dyspnea on exertion, irregular heartbeat, leg swelling,  "near-syncope, orthopnea, palpitations, paroxysmal nocturnal dyspnea and syncope.   Respiratory: Negative for cough, shortness of breath and sleep disturbances due to breathing.    Endocrine: Negative for polyuria.   Hematologic/Lymphatic: Does not bruise/bleed easily.   Skin: Negative for itching and rash.   Musculoskeletal: Negative for back pain, muscle weakness and myalgias.   Gastrointestinal: Negative for abdominal pain, change in bowel habit and nausea.   Genitourinary: Negative for dysuria, flank pain, frequency and hesitancy.   Neurological: Negative for dizziness, tremors and weakness.   Psychiatric/Behavioral: Negative for altered mental status. The patient is nervous/anxious. The patient does not have insomnia.      /80   Ht 172.7 cm (68\")   Wt 83 kg (183 lb)   SpO2 98%   BMI 27.83 kg/m² .  Objective     Vitals reviewed.   Constitutional:       General: Not in acute distress.     Appearance: Normal appearance. Well-developed.   Eyes:      Pupils: Pupils are equal, round, and reactive to light.   HENT:      Head: Normocephalic and atraumatic.   Neck:      Vascular: No JVD.   Pulmonary:      Effort: Pulmonary effort is normal.      Breath sounds: Normal breath sounds.   Cardiovascular:      Normal rate. Regular rhythm.   Pulses:     Intact distal pulses.   Abdominal:      General: There is no distension.      Palpations: Abdomen is soft.      Tenderness: There is no abdominal tenderness.   Musculoskeletal: Normal range of motion.      Cervical back: Normal range of motion and neck supple. Skin:     General: Skin is warm and dry.   Neurological:      Mental Status: Alert and oriented to person, place, and time.             ECG 12 Lead    Date/Time: 11/12/2021 11:18 AM  Performed by: Maria L Muro APRN  Authorized by: Maria L Muro APRN   Comparison: not compared with previous ECG   Rhythm: sinus rhythm  BPM: 75  Other findings: non-specific ST-T wave changes    Clinical impression: " non-specific ECG            EKG ordered by and reviewed by me in office         Diagnoses and all orders for this visit:    1. Coronary artery disease involving native coronary artery of native heart without angina pectoris (Primary)  Comments:  Patient complains of pain in his sternum that does not appear suggestive of angina.    2. S/P CABG x 4 per Dr. Smyth 3/31/2021    3. Dyslipidemia  Comments:  Treated with statin therapy           MEDICAL DECISION MAKING:           The patient is here complaining of chest pain that seems to be exacerbated by stress.  He is walking and even jogging at times up a hill and this does not induce the chest pain that he describes.    Since his last visit he has lost his younger brother to illness.  He has been grieving the loss.  He reports his emotions are very labile.  He is tearful at times.    We have had a discussion about considering medical therapy such as antidepressants and the patient reports that he is not interested in taking those types of medications.    I also talked to him about considering a therapist to talk about some of his feelings.    In addition we have also talked again about considering cardiac rehab.  I do believe this would be beneficial to the patient to the calm artery of other participants around him.    He seems to be very focused on this discomfort that he has in his chest concerned that it is worsening of his coronary artery disease.    I have scheduled the patient back for an exercise treadmill test in the office with Dr. Taylor when he returns.  Have asked him to consider participating in cardiac rehab and starting perhaps Zoloft for his depression/anxiety.    If his symptoms worsen I have asked him to contact the office sooner if anything changes have asked him to contact the office sooner.  His blood pressure is well controlled.  His EKG does not show any ST or T wave segment abnormalities

## 2021-12-23 ENCOUNTER — TELEPHONE (OUTPATIENT)
Dept: CARDIOLOGY | Facility: CLINIC | Age: 56
End: 2021-12-23

## 2022-03-31 NOTE — ADDENDUM NOTE
Addendum  created 03/31/21 1746 by Fuad Chowdhury MD    Flowsheet accepted, Intraprocedure Flowsheets edited       [employer] Counseling:  I discussed with the patient the risks of mycophenolate mofetil including but not limited to infection/immunosuppression, GI upset, hypokalemia, hypercholesterolemia, bone marrow suppression, lymphoproliferative disorders, malignancy, GI ulceration/bleed/perforation, colitis, interstitial lung disease, kidney failure, progressive multifocal leukoencephalopathy, and birth defects.  The patient understands that monitoring is required including a baseline creatinine and regular CBC testing. In addition, patient must alert us immediately if symptoms of infection or other concerning signs are noted.

## 2022-04-18 RX ORDER — CLOPIDOGREL BISULFATE 75 MG/1
75 TABLET ORAL DAILY
Qty: 30 TABLET | Refills: 5 | Status: SHIPPED | OUTPATIENT
Start: 2022-04-18 | End: 2022-04-21

## 2022-04-21 RX ORDER — CLOPIDOGREL BISULFATE 75 MG/1
75 TABLET ORAL DAILY
Qty: 30 TABLET | Refills: 5 | Status: SHIPPED | OUTPATIENT
Start: 2022-04-21 | End: 2022-10-25

## 2022-06-14 NOTE — PROGRESS NOTES
Date of Office Visit: 06/15/2022  Encounter Provider: Dr. Félix Taylor  Place of Service: [facility] CARDIOLOGY [place]  Patient Name: Jean-Claude Clifford Jr.  :1965  David Vallejo Sr., MD    Chief Complaint   Patient presents with   • Coronary Artery Disease   • Slow Heart Rate   • Follow-up     History of Present Illness:    I am pleased to see Mr. Clifford in my office today as a follow-up.    As you know, patient is 55-year-old white female whose past medical history is insignificant for medical illness who came today for follow-up.    In 2021, patient was presented to me for symptom of chest pain.  I recommended to proceed with stress test which showed inferior myocardial ischemia.  Subsequent cardiac catheterization showed three-vessel coronary artery disease.  Patient underwent CABG x4 and postoperatively did well.  Patient was subsequently discharged.    Patient came today for follow-up.  Patient still have occasional chest pain which is nonexertional and noncardiac in nature.  Patient denies any chest pain orthopnea or PND consistent with ischemia.  No syncope or presyncope.    EKG showed normal sinus rhythm.  Nonspecific ST changes.  No change from previous EKG    I would recommend to continue current treatment.  Blood pressure is controlled.  Patient is advised to increase aerobic activity.  Weight loss is emphasized.  Diet modification discussed        Past Medical History:   Diagnosis Date   • Anxiety    • Bradycardia    • Coronary artery disease    • Depression    • Migraines    • Mild cognitive impairment          Past Surgical History:   Procedure Laterality Date   • CARDIAC CATHETERIZATION N/A 3/30/2021    Procedure: Left Heart Cath;  Surgeon: Félix Taylor MD;  Location: [facility] CATH INVASIVE LOCATION;  Service: Cardiovascular;  Laterality: N/A;   • CHOLECYSTECTOMY     • CORONARY ARTERY BYPASS GRAFT N/A 3/31/2021    Procedure: CORONARY ARTERY BYPASS GRAFTING;  Surgeon: Libra  Axel Maradiaga MD;  Location: [facility];  Service: Cardiothoracic;  Laterality: N/A;   • FOOT SURGERY     • KNEE ARTHROSCOPY     • NOSE SURGERY             Current Outpatient Medications:   •  acetaminophen (TYLENOL) 500 MG tablet, Take 1 tablet by mouth Every 4 (Four) Hours As Needed for Fever (fever greater than 101.5 F or headache)., Disp: , Rfl:   •  aspirin 81 MG EC tablet, Take 81 mg by mouth Daily., Disp: , Rfl:   •  clopidogrel (PLAVIX) 75 MG tablet, TAKE 1 TABLET BY MOUTH DAILY, Disp: 30 tablet, Rfl: 5  •  metoprolol tartrate (LOPRESSOR) 25 MG tablet, TAKE 1/2 TABLET BY MOUTH EVERY 12 HOURS, Disp: 30 tablet, Rfl: 3  •  Multiple Vitamins-Minerals (MULTIVITAMIN ADULTS 50+ PO), Take 1 tablet by mouth Daily., Disp: , Rfl:       Social History     Socioeconomic History   • Marital status:    Tobacco Use   • Smoking status: Never Smoker   • Smokeless tobacco: Never Used   Vaping Use   • Vaping Use: Never used   Substance and Sexual Activity   • Alcohol use: No   • Drug use: Never   • Sexual activity: Yes     Partners: Female         Review of Systems   Constitutional: Negative for chills and fever.   HENT: Negative for ear discharge and nosebleeds.    Eyes: Negative for discharge and redness.   Cardiovascular: Positive for chest pain. Negative for orthopnea, palpitations, paroxysmal nocturnal dyspnea and syncope.   Respiratory: Positive for shortness of breath. Negative for cough and wheezing.    Endocrine: Negative for heat intolerance.   Skin: Negative for rash.   Musculoskeletal: Negative for arthritis and myalgias.   Gastrointestinal: Negative for abdominal pain, melena, nausea and vomiting.   Genitourinary: Negative for dysuria and hematuria.   Neurological: Negative for dizziness, light-headedness, numbness and tremors.   Psychiatric/Behavioral: Negative for depression. The patient is not nervous/anxious.        Procedures      ECG 12 Lead    Date/Time: 6/15/2022 9:19 AM  Performed by: Félix Taylor  "MD  Authorized by: Félix Taylor MD   Comparison: compared with previous ECG   Similar to previous ECG  Rhythm: sinus rhythm  Other findings: non-specific ST-T wave changes    Clinical impression: normal ECG            ECG 12 Lead    (Results Pending)           Objective:    /76 (BP Location: Left arm, Patient Position: Sitting, Cuff Size: Adult)   Pulse 62   Ht 172.7 cm (67.99\")   Wt 83.9 kg (185 lb)   SpO2 99%   BMI 28.14 kg/m²         Constitutional:       Appearance: Well-developed.   Eyes:      General: No scleral icterus.        Right eye: No discharge.   HENT:      Head: Normocephalic and atraumatic.   Neck:      Thyroid: No thyromegaly.      Lymphadenopathy: No cervical adenopathy.   Pulmonary:      Effort: Pulmonary effort is normal. No respiratory distress.      Breath sounds: Normal breath sounds. No wheezing. No rales.   Cardiovascular:      Normal rate. Regular rhythm.      No gallop.   Abdominal:      Tenderness: There is no abdominal tenderness.   Skin:     Findings: No erythema or rash.   Neurological:      Mental Status: Alert and oriented to person, place, and time.             Assessment:       Diagnosis Plan   1. Coronary artery disease involving native coronary artery of native heart with unstable angina pectoris (HCC)  ECG 12 Lead   2. Bradycardia, sinus  ECG 12 Lead            Plan:       MDM:    1.  Chest pain:    Patient complained of chest pain which is nonexertional and positional.  I think is not cardiac in nature.  Continue to observe.  His recent stress test was unremarkable.    2.  CAD:    Patient recently had CABG and doing well.  Continue exercise and risk factor modification    3.  Sinus bradycardia:    Patient heart rate is stable no symptom    4.  Hypertension:    Blood pressure is controlled continue Lopressor  "

## 2022-06-15 ENCOUNTER — OFFICE VISIT (OUTPATIENT)
Dept: CARDIOLOGY | Facility: CLINIC | Age: 57
End: 2022-06-15

## 2022-06-15 VITALS
HEIGHT: 68 IN | WEIGHT: 185 LBS | SYSTOLIC BLOOD PRESSURE: 122 MMHG | DIASTOLIC BLOOD PRESSURE: 76 MMHG | BODY MASS INDEX: 28.04 KG/M2 | OXYGEN SATURATION: 99 % | HEART RATE: 62 BPM

## 2022-06-15 DIAGNOSIS — R00.1 BRADYCARDIA, SINUS: ICD-10-CM

## 2022-06-15 DIAGNOSIS — I25.110 CORONARY ARTERY DISEASE INVOLVING NATIVE CORONARY ARTERY OF NATIVE HEART WITH UNSTABLE ANGINA PECTORIS: Primary | ICD-10-CM

## 2022-06-15 PROCEDURE — 99214 OFFICE O/P EST MOD 30 MIN: CPT | Performed by: INTERNAL MEDICINE

## 2022-06-15 PROCEDURE — 93000 ELECTROCARDIOGRAM COMPLETE: CPT | Performed by: INTERNAL MEDICINE

## 2022-08-17 ENCOUNTER — TELEPHONE (OUTPATIENT)
Dept: CARDIOLOGY | Facility: CLINIC | Age: 57
End: 2022-08-17

## 2022-08-17 NOTE — TELEPHONE ENCOUNTER
PT CALLED WANTING A NURSE TO CALL BACK, HAD SOME MEDICAL CONCERNS HE WANTED TO ADDRESS.     CALLBACK: [phone]

## 2022-08-17 NOTE — TELEPHONE ENCOUNTER
Patient wad having a tightness in chest area and discomfort doing specific activities. I informed patient to Keep log of B/P and HR and call if any abnormal reading to make an appointment . Informed if having severe Chest Pains or other issues should go to the ER for Evaluation. Voiced understanding.

## 2022-08-30 ENCOUNTER — HOSPITAL ENCOUNTER (EMERGENCY)
Facility: HOSPITAL | Age: 57
Discharge: HOME | End: 2022-08-30
Attending: EMERGENCY MEDICINE | Admitting: EMERGENCY MEDICINE

## 2022-08-30 ENCOUNTER — APPOINTMENT (OUTPATIENT)
Dept: CT IMAGING | Facility: HOSPITAL | Age: 57
End: 2022-08-30

## 2022-08-30 VITALS
HEIGHT: 69 IN | SYSTOLIC BLOOD PRESSURE: 104 MMHG | BODY MASS INDEX: 25.92 KG/M2 | OXYGEN SATURATION: 99 % | DIASTOLIC BLOOD PRESSURE: 65 MMHG | HEART RATE: 57 BPM | WEIGHT: 175 LBS | TEMPERATURE: 98.7 F | RESPIRATION RATE: 16 BRPM

## 2022-08-30 DIAGNOSIS — Z95.1 HX OF CABG: ICD-10-CM

## 2022-08-30 DIAGNOSIS — R07.89 ACUTE CHEST WALL PAIN: Primary | ICD-10-CM

## 2022-08-30 LAB
ALBUMIN SERPL-MCNC: 4.4 G/DL (ref 3.5–5.2)
ALBUMIN/GLOB SERPL: 1.8 G/DL
ALP SERPL-CCNC: 95 U/L (ref 39–117)
ALT SERPL W P-5'-P-CCNC: 19 U/L (ref 1–41)
ANION GAP SERPL CALCULATED.3IONS-SCNC: 11 MMOL/L (ref 5–15)
AST SERPL-CCNC: 25 U/L (ref 1–40)
BASOPHILS # BLD AUTO: 0 10*3/MM3 (ref 0–0.2)
BASOPHILS NFR BLD AUTO: 0.6 % (ref 0–1.5)
BILIRUB SERPL-MCNC: 0.7 MG/DL (ref 0–1.2)
BUN SERPL-MCNC: 11 MG/DL (ref 6–20)
BUN/CREAT SERPL: 11.3 (ref 7–25)
CALCIUM SPEC-SCNC: 8.8 MG/DL (ref 8.6–10.5)
CHLORIDE SERPL-SCNC: 104 MMOL/L (ref 98–107)
CO2 SERPL-SCNC: 26 MMOL/L (ref 22–29)
CREAT SERPL-MCNC: 0.97 MG/DL (ref 0.76–1.27)
DEPRECATED RDW RBC AUTO: 43.3 FL (ref 37–54)
EGFRCR SERPLBLD CKD-EPI 2021: 90.5 ML/MIN/1.73
EOSINOPHIL # BLD AUTO: 0.1 10*3/MM3 (ref 0–0.4)
EOSINOPHIL NFR BLD AUTO: 1.2 % (ref 0.3–6.2)
ERYTHROCYTE [DISTWIDTH] IN BLOOD BY AUTOMATED COUNT: 13.2 % (ref 12.3–15.4)
GLOBULIN UR ELPH-MCNC: 2.5 GM/DL
GLUCOSE SERPL-MCNC: 85 MG/DL (ref 65–99)
HCT VFR BLD AUTO: 42.2 % (ref 37.5–51)
HGB BLD-MCNC: 14.1 G/DL (ref 13–17.7)
LYMPHOCYTES # BLD AUTO: 1.5 10*3/MM3 (ref 0.7–3.1)
LYMPHOCYTES NFR BLD AUTO: 28.7 % (ref 19.6–45.3)
MCH RBC QN AUTO: 32.1 PG (ref 26.6–33)
MCHC RBC AUTO-ENTMCNC: 33.5 G/DL (ref 31.5–35.7)
MCV RBC AUTO: 95.6 FL (ref 79–97)
MONOCYTES # BLD AUTO: 0.5 10*3/MM3 (ref 0.1–0.9)
MONOCYTES NFR BLD AUTO: 10.1 % (ref 5–12)
NEUTROPHILS NFR BLD AUTO: 3.1 10*3/MM3 (ref 1.7–7)
NEUTROPHILS NFR BLD AUTO: 59.4 % (ref 42.7–76)
NRBC BLD AUTO-RTO: 0 /100 WBC (ref 0–0.2)
NT-PROBNP SERPL-MCNC: 137.6 PG/ML (ref 0–900)
PLATELET # BLD AUTO: 189 10*3/MM3 (ref 140–450)
PMV BLD AUTO: 7.1 FL (ref 6–12)
POTASSIUM SERPL-SCNC: 4.5 MMOL/L (ref 3.5–5.2)
PROT SERPL-MCNC: 6.9 G/DL (ref 6–8.5)
RBC # BLD AUTO: 4.41 10*6/MM3 (ref 4.14–5.8)
SARS-COV-2 RNA PNL SPEC NAA+PROBE: NOT DETECTED
SODIUM SERPL-SCNC: 141 MMOL/L (ref 136–145)
TROPONIN T SERPL-MCNC: <0.01 NG/ML (ref 0–0.03)
WBC NRBC COR # BLD: 5.2 10*3/MM3 (ref 3.4–10.8)
WHOLE BLOOD HOLD COAG: NORMAL

## 2022-08-30 PROCEDURE — 71275 CT ANGIOGRAPHY CHEST: CPT

## 2022-08-30 PROCEDURE — 96374 THER/PROPH/DIAG INJ IV PUSH: CPT

## 2022-08-30 PROCEDURE — 25010000002 KETOROLAC TROMETHAMINE PER 15 MG: Performed by: EMERGENCY MEDICINE

## 2022-08-30 PROCEDURE — 93005 ELECTROCARDIOGRAM TRACING: CPT

## 2022-08-30 PROCEDURE — 0 IOPAMIDOL PER 1 ML: Performed by: EMERGENCY MEDICINE

## 2022-08-30 PROCEDURE — 80053 COMPREHEN METABOLIC PANEL: CPT | Performed by: EMERGENCY MEDICINE

## 2022-08-30 PROCEDURE — 87635 SARS-COV-2 COVID-19 AMP PRB: CPT | Performed by: EMERGENCY MEDICINE

## 2022-08-30 PROCEDURE — 84484 ASSAY OF TROPONIN QUANT: CPT | Performed by: EMERGENCY MEDICINE

## 2022-08-30 PROCEDURE — 85025 COMPLETE CBC W/AUTO DIFF WBC: CPT | Performed by: EMERGENCY MEDICINE

## 2022-08-30 PROCEDURE — 83880 ASSAY OF NATRIURETIC PEPTIDE: CPT | Performed by: EMERGENCY MEDICINE

## 2022-08-30 PROCEDURE — 99283 EMERGENCY DEPT VISIT LOW MDM: CPT

## 2022-08-30 PROCEDURE — 93005 ELECTROCARDIOGRAM TRACING: CPT | Performed by: EMERGENCY MEDICINE

## 2022-08-30 RX ORDER — DICLOFENAC SODIUM 75 MG/1
75 TABLET, DELAYED RELEASE ORAL 2 TIMES DAILY
Qty: 15 TABLET | Refills: 0 | Status: SHIPPED | OUTPATIENT
Start: 2022-08-30 | End: 2023-03-15

## 2022-08-30 RX ORDER — HYDROCODONE BITARTRATE AND ACETAMINOPHEN 5; 325 MG/1; MG/1
1 TABLET ORAL EVERY 6 HOURS PRN
Qty: 12 TABLET | Refills: 0 | Status: SHIPPED | OUTPATIENT
Start: 2022-08-30 | End: 2023-03-15

## 2022-08-30 RX ORDER — KETOROLAC TROMETHAMINE 30 MG/ML
30 INJECTION, SOLUTION INTRAMUSCULAR; INTRAVENOUS ONCE
Status: COMPLETED | OUTPATIENT
Start: 2022-08-30 | End: 2022-08-30

## 2022-08-30 RX ADMIN — IOPAMIDOL 100 ML: 755 INJECTION, SOLUTION INTRAVENOUS at 15:27

## 2022-08-30 RX ADMIN — KETOROLAC TROMETHAMINE 30 MG: 30 INJECTION, SOLUTION INTRAMUSCULAR at 14:26

## 2022-08-30 RX ADMIN — SODIUM CHLORIDE 500 ML: 9 INJECTION, SOLUTION INTRAVENOUS at 14:26

## 2022-09-07 ENCOUNTER — TELEPHONE (OUTPATIENT)
Dept: CARDIOLOGY | Facility: CLINIC | Age: 57
End: 2022-09-07

## 2022-09-07 NOTE — TELEPHONE ENCOUNTER
Caller: Brendan    Relationship: self    Best call back number: [phone]    What is the best time to reach you: ANY      What was the call regarding: PT WENT TO THE ED [facility] 8/31/22 - WAS GIVE DICLOFENAC FOR CHEST INFLAMMATION -HE IS NOT FEELING ANY BETTER RAN HAS RAN OUT OF MEDICATION. HE BELIEVES THAT THE MEDS HAVE NOT REALLY HELPED.   FIRST AVAIL FOLLOW UP WAS NOT UNTIL 9/26/22.  HE WOULD LIKE TO BE SEEN SOONER OR WOULD LIKE A CALL TO DISCUSS ANOTHER MEDICATION.    Do you require a callback: yes

## 2022-09-11 LAB — QT INTERVAL: 382 MS

## 2022-09-13 ENCOUNTER — OFFICE VISIT (OUTPATIENT)
Dept: CARDIOLOGY | Facility: CLINIC | Age: 57
End: 2022-09-13

## 2022-09-13 VITALS
DIASTOLIC BLOOD PRESSURE: 82 MMHG | HEIGHT: 69 IN | OXYGEN SATURATION: 99 % | SYSTOLIC BLOOD PRESSURE: 138 MMHG | HEART RATE: 74 BPM | WEIGHT: 189 LBS | BODY MASS INDEX: 27.99 KG/M2

## 2022-09-13 DIAGNOSIS — E78.5 DYSLIPIDEMIA: ICD-10-CM

## 2022-09-13 DIAGNOSIS — R07.1 CHEST PAIN ON BREATHING: ICD-10-CM

## 2022-09-13 DIAGNOSIS — I25.110 CORONARY ARTERY DISEASE INVOLVING NATIVE CORONARY ARTERY OF NATIVE HEART WITH UNSTABLE ANGINA PECTORIS: Primary | ICD-10-CM

## 2022-09-13 PROCEDURE — 93000 ELECTROCARDIOGRAM COMPLETE: CPT | Performed by: NURSE PRACTITIONER

## 2022-09-13 PROCEDURE — 99214 OFFICE O/P EST MOD 30 MIN: CPT | Performed by: NURSE PRACTITIONER

## 2022-09-16 NOTE — PROGRESS NOTES
Cardiology Office Follow Up Visit      Primary Care Provider:  Adam Sanchez MD    Reason for f/u:     Chest pain  Coronary artery disease  Hypertension      Subjective     CC:    Complains of recent chest discomfort    History of Present Illness       Jean-Claude Clifford Jr. is a 56 y.o. male.  Patient is a 56-year-old male who is known to have coronary artery disease.  He was initially evaluated back in March 2021.  He underwent stress Myoview which showed inferior wall ischemia.  He went on to have cardiac catheterization which showed three-vessel coronary artery disease and he had four-vessel coronary artery bypass grafting surgery by Dr. Randle.    Since his bypass surgery he has had persistent episodes of chest discomfort and soreness in his sternum.  It gets worse when he is under stress but reports it is present most all the time and will wax and wane in intensity.    In November of last year the patient underwent exercise treadmill test in our office completing 9 minutes of [place] protocol achieving over 85% of max predicted heart rate for age.  There was no evidence of ischemia by EKG, provoked arrhythmia or symptoms of worsening chest pain with exercise.    Patient presents today for earlier than scheduled follow-up due to complaints of progressive and continued chest discomfort.  As well as shortness of breath with exertion.  He reports he feels as if he cannot take a big deep breath in at times.    It has been more than 1 year since his surgery and he is expressing some frustration because of the persistent chest pain that is occurring.              ASSESSMENT/PLAN:      Diagnoses and all orders for this visit:    1. Coronary artery disease involving native coronary artery of native heart with unstable angina pectoris (HCC) (Primary)  -     Stress Test With Myocardial Perfusion (1 Day); Future  -     Adult Transthoracic Echo Complete W/ Cont if Necessary Per Protocol; Future    2. Chest pain on  breathing  -     Stress Test With Myocardial Perfusion (1 Day); Future  -     Adult Transthoracic Echo Complete W/ Cont if Necessary Per Protocol; Future    3. Dyslipidemia  -     Lipid Panel; Future            MEDICAL DECISION MAKING:      Patient is having chest pain that is somewhat atypical for angina.  Previous exercise treadmill test did not suggest any ischemia by EKG criteria.    His chest discomfort has persisted as well as feeling short of breath with exertion.    On clinical exam the patient does not appear to have any overt heart failure.  Exam of his sternum does not reproduce the pain.  There is no clicking or movement in his sternum noted.    EKG shows sinus rhythm with nonspecific ST and T wave segment abnormalities.  No acute ST changes.    We have discussed options.  I would like to repeat an echocardiogram to rule out any pericardial effusion.  In addition we will do a stress Myoview to rule out an ischemic burden.    If these tests are within normal limits we have discussed considering referral to pain management.    Patient has not had a recent lipid assessment.  I have ordered a lipid panel to be completed as well to ensure his lipids are at goal.    His blood pressure today is stable on current medical therapy.    The patient is in agreement to this plan.  Additional recommendations will be made pending review of the above test      Past Medical History:   Diagnosis Date   • Anxiety    • Bradycardia    • Coronary artery disease    • Depression    • Migraines    • Mild cognitive impairment        Past Surgical History:   Procedure Laterality Date   • CARDIAC CATHETERIZATION N/A 3/30/2021    Procedure: Left Heart Cath;  Surgeon: Félix Taylor MD;  Location: [facility] CATH INVASIVE LOCATION;  Service: Cardiovascular;  Laterality: N/A;   • CHOLECYSTECTOMY     • CORONARY ARTERY BYPASS GRAFT N/A 3/31/2021    Procedure: CORONARY ARTERY BYPASS GRAFTING;  Surgeon: Axel Smyth MD;  Location: [facility]  CVOR;  Service: Cardiothoracic;  Laterality: N/A;   • FOOT SURGERY     • KNEE ARTHROSCOPY     • NOSE SURGERY           Current Outpatient Medications:   •  aspirin 81 MG EC tablet, Take 81 mg by mouth Daily., Disp: , Rfl:   •  clopidogrel (PLAVIX) 75 MG tablet, TAKE 1 TABLET BY MOUTH DAILY, Disp: 30 tablet, Rfl: 5  •  metoprolol tartrate (LOPRESSOR) 25 MG tablet, TAKE 1/2 TABLET BY MOUTH EVERY 12 HOURS, Disp: 30 tablet, Rfl: 3  •  Multiple Vitamins-Minerals (MULTIVITAMIN ADULTS 50+ PO), Take 1 tablet by mouth Daily., Disp: , Rfl:   •  acetaminophen (TYLENOL) 500 MG tablet, Take 1 tablet by mouth Every 4 (Four) Hours As Needed for Fever (fever greater than 101.5 F or headache)., Disp: , Rfl:   •  diclofenac (VOLTAREN) 75 MG EC tablet, Take 1 tablet by mouth 2 (Two) Times a Day., Disp: 15 tablet, Rfl: 0  •  HYDROcodone-acetaminophen (NORCO) 5-325 MG per tablet, Take 1 tablet by mouth Every 6 (Six) Hours As Needed for Moderate Pain or Severe Pain., Disp: 12 tablet, Rfl: 0    Social History     Socioeconomic History   • Marital status:    Tobacco Use   • Smoking status: Never Smoker   • Smokeless tobacco: Never Used   Vaping Use   • Vaping Use: Never used   Substance and Sexual Activity   • Alcohol use: No   • Drug use: Never   • Sexual activity: Yes     Partners: Female       Family History   Problem Relation Age of Onset   • Heart attack Father    • Heart disease Father        The following portions of the patient's history were reviewed and updated as appropriate: allergies, current medications, past family history, past medical history, past social history, past surgical history and problem list.    Review of Systems   Constitutional: Negative for decreased appetite and diaphoresis.   HENT: Negative for congestion, hearing loss and nosebleeds.    Cardiovascular: Positive for chest pain and dyspnea on exertion. Negative for claudication, irregular heartbeat, leg swelling, near-syncope, orthopnea, palpitations,  "paroxysmal nocturnal dyspnea and syncope.   Respiratory: Negative for cough, shortness of breath and sleep disturbances due to breathing.    Endocrine: Negative for polyuria.   Hematologic/Lymphatic: Does not bruise/bleed easily.   Skin: Negative for itching and rash.   Musculoskeletal: Negative for back pain, muscle weakness and myalgias.   Gastrointestinal: Negative for abdominal pain, change in bowel habit and nausea.   Genitourinary: Negative for dysuria, flank pain, frequency and hesitancy.   Neurological: Negative for dizziness, tremors and weakness.   Psychiatric/Behavioral: Negative for altered mental status. The patient does not have insomnia.      /82   Pulse 74   Ht 174 cm (68.5\")   Wt 85.7 kg (189 lb)   SpO2 99%   BMI 28.32 kg/m² .  Objective     Vitals reviewed.   Constitutional:       General: Not in acute distress.     Appearance: Normal appearance. Well-developed.   Eyes:      Pupils: Pupils are equal, round, and reactive to light.   HENT:      Head: Normocephalic and atraumatic.   Neck:      Vascular: No JVD.   Pulmonary:      Effort: Pulmonary effort is normal.      Breath sounds: Normal breath sounds.   Cardiovascular:      Normal rate. Regular rhythm.   Pulses:     Intact distal pulses.   Edema:     Peripheral edema absent.   Abdominal:      General: There is no distension.      Palpations: Abdomen is soft.      Tenderness: There is no abdominal tenderness.   Musculoskeletal: Normal range of motion.      Cervical back: Normal range of motion and neck supple. Skin:     General: Skin is warm and dry.   Neurological:      Mental Status: Alert and oriented to person, place, and time.             ECG 12 Lead    Date/Time: 9/13/2022 1:01 PM  Performed by: Maria L Muro APRN  Authorized by: Maria L Muro APRN   Rhythm: sinus rhythm  Rate: normal  BPM: 74  Conduction: conduction normal  QRS axis: normal  Other findings: non-specific ST-T wave changes    Clinical impression: " non-specific ECG            EKG ordered by and reviewed by me in office

## 2022-09-17 ENCOUNTER — HOSPITAL ENCOUNTER (EMERGENCY)
Facility: HOSPITAL | Age: 57
Discharge: HOME | End: 2022-09-17
Attending: EMERGENCY MEDICINE | Admitting: EMERGENCY MEDICINE

## 2022-09-17 VITALS
SYSTOLIC BLOOD PRESSURE: 109 MMHG | HEIGHT: 69 IN | RESPIRATION RATE: 18 BRPM | HEART RATE: 74 BPM | BODY MASS INDEX: 27.89 KG/M2 | OXYGEN SATURATION: 97 % | TEMPERATURE: 98.2 F | WEIGHT: 188.27 LBS | DIASTOLIC BLOOD PRESSURE: 59 MMHG

## 2022-09-17 DIAGNOSIS — R25.2 MUSCLE CRAMPS: Primary | ICD-10-CM

## 2022-09-17 LAB
ALBUMIN SERPL-MCNC: 4.2 G/DL (ref 3.5–5.2)
ALBUMIN/GLOB SERPL: 1.9 G/DL
ALP SERPL-CCNC: 88 U/L (ref 39–117)
ALT SERPL W P-5'-P-CCNC: 16 U/L (ref 1–41)
ANION GAP SERPL CALCULATED.3IONS-SCNC: 10 MMOL/L (ref 5–15)
AST SERPL-CCNC: 24 U/L (ref 1–40)
BASOPHILS # BLD AUTO: 0 10*3/MM3 (ref 0–0.2)
BASOPHILS NFR BLD AUTO: 0.5 % (ref 0–1.5)
BILIRUB SERPL-MCNC: 0.7 MG/DL (ref 0–1.2)
BUN SERPL-MCNC: 11 MG/DL (ref 6–20)
BUN/CREAT SERPL: 11.7 (ref 7–25)
CALCIUM SPEC-SCNC: 8.6 MG/DL (ref 8.6–10.5)
CHLORIDE SERPL-SCNC: 104 MMOL/L (ref 98–107)
CO2 SERPL-SCNC: 24 MMOL/L (ref 22–29)
CREAT SERPL-MCNC: 0.94 MG/DL (ref 0.76–1.27)
DEPRECATED RDW RBC AUTO: 43.8 FL (ref 37–54)
EGFRCR SERPLBLD CKD-EPI 2021: 94 ML/MIN/1.73
EOSINOPHIL # BLD AUTO: 0.1 10*3/MM3 (ref 0–0.4)
EOSINOPHIL NFR BLD AUTO: 1.6 % (ref 0.3–6.2)
ERYTHROCYTE [DISTWIDTH] IN BLOOD BY AUTOMATED COUNT: 13.1 % (ref 12.3–15.4)
GLOBULIN UR ELPH-MCNC: 2.2 GM/DL
GLUCOSE SERPL-MCNC: 119 MG/DL (ref 65–99)
HCT VFR BLD AUTO: 41 % (ref 37.5–51)
HGB BLD-MCNC: 13.7 G/DL (ref 13–17.7)
LYMPHOCYTES # BLD AUTO: 1.5 10*3/MM3 (ref 0.7–3.1)
LYMPHOCYTES NFR BLD AUTO: 30.7 % (ref 19.6–45.3)
MAGNESIUM SERPL-MCNC: 2 MG/DL (ref 1.6–2.6)
MCH RBC QN AUTO: 32.6 PG (ref 26.6–33)
MCHC RBC AUTO-ENTMCNC: 33.5 G/DL (ref 31.5–35.7)
MCV RBC AUTO: 97.1 FL (ref 79–97)
MONOCYTES # BLD AUTO: 0.5 10*3/MM3 (ref 0.1–0.9)
MONOCYTES NFR BLD AUTO: 10.5 % (ref 5–12)
NEUTROPHILS NFR BLD AUTO: 2.7 10*3/MM3 (ref 1.7–7)
NEUTROPHILS NFR BLD AUTO: 56.7 % (ref 42.7–76)
NRBC BLD AUTO-RTO: 0 /100 WBC (ref 0–0.2)
PLATELET # BLD AUTO: 188 10*3/MM3 (ref 140–450)
PMV BLD AUTO: 7.3 FL (ref 6–12)
POTASSIUM SERPL-SCNC: 3.6 MMOL/L (ref 3.5–5.2)
PROT SERPL-MCNC: 6.4 G/DL (ref 6–8.5)
RBC # BLD AUTO: 4.22 10*6/MM3 (ref 4.14–5.8)
SODIUM SERPL-SCNC: 138 MMOL/L (ref 136–145)
WBC NRBC COR # BLD: 4.8 10*3/MM3 (ref 3.4–10.8)

## 2022-09-17 PROCEDURE — 85025 COMPLETE CBC W/AUTO DIFF WBC: CPT | Performed by: EMERGENCY MEDICINE

## 2022-09-17 PROCEDURE — 99283 EMERGENCY DEPT VISIT LOW MDM: CPT

## 2022-09-17 PROCEDURE — 83735 ASSAY OF MAGNESIUM: CPT | Performed by: EMERGENCY MEDICINE

## 2022-09-17 PROCEDURE — 80053 COMPREHEN METABOLIC PANEL: CPT | Performed by: EMERGENCY MEDICINE

## 2022-09-17 RX ORDER — SODIUM CHLORIDE 0.9 % (FLUSH) 0.9 %
10 SYRINGE (ML) INJECTION AS NEEDED
Status: DISCONTINUED | OUTPATIENT
Start: 2022-09-17 | End: 2022-09-17 | Stop reason: HOSPADM

## 2022-09-17 RX ORDER — POTASSIUM CHLORIDE 20 MEQ/1
40 TABLET, EXTENDED RELEASE ORAL ONCE
Status: COMPLETED | OUTPATIENT
Start: 2022-09-17 | End: 2022-09-17

## 2022-09-17 RX ADMIN — POTASSIUM CHLORIDE 40 MEQ: 1500 TABLET, EXTENDED RELEASE ORAL at 20:13

## 2022-09-17 RX ADMIN — SODIUM CHLORIDE, POTASSIUM CHLORIDE, SODIUM LACTATE AND CALCIUM CHLORIDE 500 ML: 600; 310; 30; 20 INJECTION, SOLUTION INTRAVENOUS at 19:06

## 2022-09-17 NOTE — ED PROVIDER NOTES
Subjective   History of Present Illness  56-year male with history of CAD presents for medial thigh cramping.  Was severe last night.  Started on left thigh which then improved and went to right thigh.  States he felt like he had not all his legs.  Still having some mild pain today but not at all like he did before.  No numbness or weakness.  States it felt like cramps.  By stretching and drinking Gatorade it seemed to go away.  Now he just has some muscle soreness.  Review of Systems   Musculoskeletal: Positive for myalgias.   All other systems reviewed and are negative.      Past Medical History:   Diagnosis Date   • Anxiety    • Bradycardia    • Coronary artery disease    • Depression    • Migraines    • Mild cognitive impairment        Allergies   Allergen Reactions   • Shellfish Allergy Anaphylaxis       Past Surgical History:   Procedure Laterality Date   • CARDIAC CATHETERIZATION N/A 3/30/2021    Procedure: Left Heart Cath;  Surgeon: Félix Taylor MD;  Location: [facility] CATH INVASIVE LOCATION;  Service: Cardiovascular;  Laterality: N/A;   • CHOLECYSTECTOMY     • CORONARY ARTERY BYPASS GRAFT N/A 3/31/2021    Procedure: CORONARY ARTERY BYPASS GRAFTING;  Surgeon: Axel Smyth MD;  Location: [facility] CVOR;  Service: Cardiothoracic;  Laterality: N/A;   • FOOT SURGERY     • KNEE ARTHROSCOPY     • NOSE SURGERY         Family History   Problem Relation Age of Onset   • Heart attack Father    • Heart disease Father        Social History     Socioeconomic History   • Marital status:    Tobacco Use   • Smoking status: Never Smoker   • Smokeless tobacco: Never Used   Vaping Use   • Vaping Use: Never used   Substance and Sexual Activity   • Alcohol use: No   • Drug use: Never   • Sexual activity: Yes     Partners: Female           Objective   Physical Exam  Constitutional:  No acute distress.  Head:  Atraumatic.  Normocephalic.   Eyes:  No scleral icterus. Normal conjunctiva  ENT:  Moist mucosa.  No nasal  "discharge present.  Cardiovascular:  Well perfused.  2+ and equal DTN DP pulses.  Regular rate.  Normal capillary refill.    Pulmonary/Chest:  No respiratory distress.  Airway patent.  No tachypnea.  No accessory muscle usage.    Abdominal:  Non-distended. Non-tender.   Extremities:  No bilateral lower extremity peripheral edema.  No Deformity  Skin:  Warm, dry  Neurological:  Alert, awake, and appropriate.  Normal speech.  Normal strength normal sensation bilateral lower extremities.    Procedures           ED Course      /67 (BP Location: Left arm, Patient Position: Sitting)   Pulse 69   Temp 98.4 °F (36.9 °C) (Temporal)   Resp 19   Ht 174 cm (68.5\")   Wt 85.4 kg (188 lb 4.4 oz)   SpO2 98%   BMI 28.21 kg/m²   Labs Reviewed   COMPREHENSIVE METABOLIC PANEL - Abnormal; Notable for the following components:       Result Value    Glucose 119 (*)     All other components within normal limits    Narrative:     GFR Normal >60  Chronic Kidney Disease <60  Kidney Failure <15     CBC WITH AUTO DIFFERENTIAL - Abnormal; Notable for the following components:    MCV 97.1 (*)     All other components within normal limits   MAGNESIUM - Normal   CBC AND DIFFERENTIAL    Narrative:     The following orders were created for panel order CBC & Differential.  Procedure                               Abnormality         Status                     ---------                               -----------         ------                     CBC Auto Differential[[id number]]        Abnormal            Final result                 Please view results for these tests on the individual orders.     Medications   sodium chloride 0.9 % flush 10 mL (has no administration in time range)   potassium chloride (K-DUR,KLOR-CON) CR tablet 40 mEq (has no administration in time range)   lactated ringers bolus 500 mL (500 mL Intravenous New Bag 9/17/22 1906)     No radiology results for the last day                                       MDM  History and " exam most consistent with muscle spasm.  Not consistent with DVT.  Patient without any swelling.  Resolved with Gatorade.  Advised him to have a sugar-free Gatorade whenever he feels like he may be having muscle cramps.  Return ER precautions discussed.  DC home.  Final diagnoses:   Muscle cramps       ED Disposition  ED Disposition     ED Disposition   Discharge    Condition   Stable    Comment   --             Adam Sanchez MD  [address] [place] [address] [address]  [place] [address]  [phone]    In 2 days           Medication List      No changes were made to your prescriptions during this visit.          Bro Nash MD  09/17/22 2002

## 2022-10-05 ENCOUNTER — HOSPITAL ENCOUNTER (OUTPATIENT)
Dept: NUCLEAR MEDICINE | Facility: HOSPITAL | Age: 57
Discharge: HOME | End: 2022-10-05

## 2022-10-05 ENCOUNTER — HOSPITAL ENCOUNTER (OUTPATIENT)
Dept: CARDIOLOGY | Facility: HOSPITAL | Age: 57
Discharge: HOME | End: 2022-10-05
Admitting: NURSE PRACTITIONER

## 2022-10-05 VITALS
HEIGHT: 68 IN | SYSTOLIC BLOOD PRESSURE: 109 MMHG | DIASTOLIC BLOOD PRESSURE: 74 MMHG | BODY MASS INDEX: 28.49 KG/M2 | WEIGHT: 188 LBS

## 2022-10-05 DIAGNOSIS — I25.110 CORONARY ARTERY DISEASE INVOLVING NATIVE CORONARY ARTERY OF NATIVE HEART WITH UNSTABLE ANGINA PECTORIS: ICD-10-CM

## 2022-10-05 DIAGNOSIS — R07.1 CHEST PAIN ON BREATHING: ICD-10-CM

## 2022-10-05 PROCEDURE — 93306 TTE W/DOPPLER COMPLETE: CPT | Performed by: INTERNAL MEDICINE

## 2022-10-05 PROCEDURE — 0 TECHNETIUM TETROFOSMIN KIT: Performed by: NURSE PRACTITIONER

## 2022-10-05 PROCEDURE — 93016 CV STRESS TEST SUPVJ ONLY: CPT | Performed by: INTERNAL MEDICINE

## 2022-10-05 PROCEDURE — 93018 CV STRESS TEST I&R ONLY: CPT | Performed by: INTERNAL MEDICINE

## 2022-10-05 PROCEDURE — 78452 HT MUSCLE IMAGE SPECT MULT: CPT | Performed by: INTERNAL MEDICINE

## 2022-10-05 PROCEDURE — 78452 HT MUSCLE IMAGE SPECT MULT: CPT

## 2022-10-05 PROCEDURE — A9502 TC99M TETROFOSMIN: HCPCS | Performed by: NURSE PRACTITIONER

## 2022-10-05 PROCEDURE — 93017 CV STRESS TEST TRACING ONLY: CPT

## 2022-10-05 PROCEDURE — 93306 TTE W/DOPPLER COMPLETE: CPT

## 2022-10-05 RX ADMIN — TETROFOSMIN 1 DOSE: 1.38 INJECTION, POWDER, LYOPHILIZED, FOR SOLUTION INTRAVENOUS at 08:20

## 2022-10-05 RX ADMIN — TETROFOSMIN 1 DOSE: 1.38 INJECTION, POWDER, LYOPHILIZED, FOR SOLUTION INTRAVENOUS at 09:43

## 2022-10-06 LAB
BH CV REST NUCLEAR ISOTOPE DOSE: 10.6 MCI
BH CV STRESS BP STAGE 1: NORMAL
BH CV STRESS BP STAGE 2: NORMAL
BH CV STRESS BP STAGE 3: NORMAL
BH CV STRESS BP STAGE 4: NORMAL
BH CV STRESS DURATION MIN STAGE 1: 3
BH CV STRESS DURATION MIN STAGE 2: 3
BH CV STRESS DURATION MIN STAGE 3: 3
BH CV STRESS DURATION MIN STAGE 4: 3
BH CV STRESS DURATION SEC STAGE 1: 0
BH CV STRESS DURATION SEC STAGE 2: 0
BH CV STRESS DURATION SEC STAGE 3: 0
BH CV STRESS DURATION SEC STAGE 4: 0
BH CV STRESS GRADE STAGE 1: 10
BH CV STRESS GRADE STAGE 2: 12
BH CV STRESS GRADE STAGE 3: 14
BH CV STRESS GRADE STAGE 4: 16
BH CV STRESS HR STAGE 1: 103
BH CV STRESS HR STAGE 2: 111
BH CV STRESS HR STAGE 3: 128
BH CV STRESS HR STAGE 4: 138
BH CV STRESS METS STAGE 1: 5
BH CV STRESS METS STAGE 2: 7.5
BH CV STRESS METS STAGE 3: 10
BH CV STRESS METS STAGE 4: 13.5
BH CV STRESS NUCLEAR ISOTOPE DOSE: 30.9 MCI
BH CV STRESS PROTOCOL 1: NORMAL
BH CV STRESS RECOVERY BP: NORMAL MMHG
BH CV STRESS RECOVERY HR: 89 BPM
BH CV STRESS SPEED STAGE 1: 1.7
BH CV STRESS SPEED STAGE 2: 2.5
BH CV STRESS SPEED STAGE 3: 3.4
BH CV STRESS SPEED STAGE 4: 4.2
BH CV STRESS STAGE 1: 1
BH CV STRESS STAGE 2: 2
BH CV STRESS STAGE 3: 3
BH CV STRESS STAGE 4: 4
LV EF NUC BP: 64 %
MAXIMAL PREDICTED HEART RATE: 162 BPM
PERCENT MAX PREDICTED HR: 85.19 %
STRESS BASELINE BP: NORMAL MMHG
STRESS BASELINE HR: 77 BPM
STRESS PERCENT HR: 100 %
STRESS POST ESTIMATED WORKLOAD: 12.8 METS
STRESS POST EXERCISE DUR MIN: 10 MIN
STRESS POST EXERCISE DUR SEC: 31 SEC
STRESS POST PEAK BP: NORMAL MMHG
STRESS POST PEAK HR: 138 BPM
STRESS TARGET HR: 138 BPM

## 2022-10-07 NOTE — PROGRESS NOTES
Let pt know that his stress test looked ok  There was no reversible ischemia.   His exercise tolerance was great.   I am awaiting echo to be read and will let him know results when I get them.   IF he would like a referral to pain management I can put that in.  Or if he would like another visit with Dr. Taylor to discuss ongoing pain please schedule

## 2022-10-13 LAB
BH CV ECHO MEAS - ACS: 1.85 CM
BH CV ECHO MEAS - AO MAX PG: 3.8 MMHG
BH CV ECHO MEAS - AO MEAN PG: 2.15 MMHG
BH CV ECHO MEAS - AO ROOT DIAM: 2.9 CM
BH CV ECHO MEAS - AO V2 MAX: 97.1 CM/SEC
BH CV ECHO MEAS - AO V2 VTI: 20.3 CM
BH CV ECHO MEAS - AVA(I,D): 3 CM2
BH CV ECHO MEAS - EDV(CUBED): 109 ML
BH CV ECHO MEAS - EDV(MOD-SP4): 46.2 ML
BH CV ECHO MEAS - EF(MOD-BP): 69 %
BH CV ECHO MEAS - EF(MOD-SP4): 69.4 %
BH CV ECHO MEAS - ESV(CUBED): 27.8 ML
BH CV ECHO MEAS - ESV(MOD-SP4): 14.1 ML
BH CV ECHO MEAS - FS: 36.6 %
BH CV ECHO MEAS - IVS/LVPW: 1.15 CM
BH CV ECHO MEAS - IVSD: 0.82 CM
BH CV ECHO MEAS - LA DIMENSION: 4.1 CM
BH CV ECHO MEAS - LV DIASTOLIC VOL/BSA (35-75): 23.2 CM2
BH CV ECHO MEAS - LV MASS(C)D: 119.6 GRAMS
BH CV ECHO MEAS - LV MAX PG: 2.6 MMHG
BH CV ECHO MEAS - LV MEAN PG: 1.27 MMHG
BH CV ECHO MEAS - LV SYSTOLIC VOL/BSA (12-30): 7.1 CM2
BH CV ECHO MEAS - LV V1 MAX: 81.3 CM/SEC
BH CV ECHO MEAS - LV V1 VTI: 16.2 CM
BH CV ECHO MEAS - LVIDD: 4.8 CM
BH CV ECHO MEAS - LVIDS: 3 CM
BH CV ECHO MEAS - LVOT AREA: 3.8 CM2
BH CV ECHO MEAS - LVOT DIAM: 2.19 CM
BH CV ECHO MEAS - LVPWD: 0.72 CM
BH CV ECHO MEAS - MV A MAX VEL: 64.8 CM/SEC
BH CV ECHO MEAS - MV DEC SLOPE: 394.1 CM/SEC2
BH CV ECHO MEAS - MV DEC TIME: 0.18 MSEC
BH CV ECHO MEAS - MV E MAX VEL: 70.2 CM/SEC
BH CV ECHO MEAS - MV E/A: 1.08
BH CV ECHO MEAS - MV MAX PG: 2.6 MMHG
BH CV ECHO MEAS - MV MEAN PG: 0.81 MMHG
BH CV ECHO MEAS - MV V2 VTI: 21.3 CM
BH CV ECHO MEAS - MVA(VTI): 2.9 CM2
BH CV ECHO MEAS - PA ACC TIME: 0.1 SEC
BH CV ECHO MEAS - PA PR(ACCEL): 32.3 MMHG
BH CV ECHO MEAS - PA V2 MAX: 151.3 CM/SEC
BH CV ECHO MEAS - PULM A REVS DUR: 0.12 SEC
BH CV ECHO MEAS - PULM A REVS VEL: 26.5 CM/SEC
BH CV ECHO MEAS - PULM DIAS VEL: 49.3 CM/SEC
BH CV ECHO MEAS - PULM S/D: 1.13
BH CV ECHO MEAS - PULM SYS VEL: 55.8 CM/SEC
BH CV ECHO MEAS - RAP SYSTOLE: 3 MMHG
BH CV ECHO MEAS - RV MAX PG: 2.34 MMHG
BH CV ECHO MEAS - RV V1 MAX: 76.4 CM/SEC
BH CV ECHO MEAS - RV V1 VTI: 17.8 CM
BH CV ECHO MEAS - RVDD: 3.1 CM
BH CV ECHO MEAS - SI(MOD-SP4): 16.1 ML/M2
BH CV ECHO MEAS - SV(LVOT): 61.2 ML
BH CV ECHO MEAS - SV(MOD-SP4): 32.1 ML
MAXIMAL PREDICTED HEART RATE: 162 BPM
STRESS TARGET HR: 138 BPM

## 2022-10-13 NOTE — PROGRESS NOTES
Please let pt know the echocardiogram looked ok  If his symptoms are persisting I would recommend coming back in to talk to dr. Taylor about them

## 2022-10-25 RX ORDER — CLOPIDOGREL BISULFATE 75 MG/1
75 TABLET ORAL DAILY
Qty: 30 TABLET | Refills: 5 | Status: SHIPPED | OUTPATIENT
Start: 2022-10-25

## 2023-03-14 NOTE — PROGRESS NOTES
Date of Office Visit: 03/15/2023  Encounter Provider: Dr. Félix Taylor  Place of Service: [facility] CARDIOLOGY [place]  Patient Name: Jean-Claude Clifford Jr.  :1965  Adam Sanchez MD    Chief Complaint   Patient presents with   • Coronary Artery Disease   • Slow Heart Rate   • Follow-up     History of Present Illness:    I am pleased to see Mr. Clifford in my office today as a follow-up.    As you know, patient is 57-year-old white female whose past medical history is insignificant for medical illness who came today for follow-up.    In 2021, patient was presented to me for symptom of chest pain.  I recommended to proceed with stress test which showed inferior myocardial ischemia.  Subsequent cardiac catheterization showed three-vessel coronary artery disease.  Patient underwent CABG x4 and postoperatively did well.  Patient was subsequently discharged.    In 2022, patient was seen by Criselda Muro and he reported symptom of chest pain.  Patient underwent stress test with [employer] imaging which was negative for ischemia or myocardial infarction.  Patient underwent echocardiogram which showed EF of 55 to 60% and mild hypokinesis of septum was noted.    Patient came today and he continues to have symptom of discomfort in the chest.  It is a constant pain.  Patient appears to have hyperesthesia.  Patient denies any exertional component of chest pain.  Patient is not short of breath.  No orthopnea PND no syncope or presyncope.    EKG showed normal sinus rhythm nonspecific ST changes noted.    At this stage, chest pain of the patient is noncardiac in nature.  He had extensive cardiac work-up.  In the stress test he walked for 10 minutes and 35 seconds and did not produce any chest pain.  I suspect it is probably due to musculoskeletal and especially due to hyperesthesia after the surgery.  I recommend over-the-counter capsaicin cream.  Patient questions were all  answered.      Past Medical History:   Diagnosis Date   • Anxiety    • Bradycardia    • Coronary artery disease    • Depression    • Migraines    • Mild cognitive impairment          Past Surgical History:   Procedure Laterality Date   • CARDIAC CATHETERIZATION N/A 3/30/2021    Procedure: Left Heart Cath;  Surgeon: Félix Taylor MD;  Location: [facility] CATH INVASIVE LOCATION;  Service: Cardiovascular;  Laterality: N/A;   • CHOLECYSTECTOMY     • CORONARY ARTERY BYPASS GRAFT N/A 3/31/2021    Procedure: CORONARY ARTERY BYPASS GRAFTING;  Surgeon: Axel Smyth MD;  Location: [facility] CVOR;  Service: Cardiothoracic;  Laterality: N/A;   • FOOT SURGERY     • KNEE ARTHROSCOPY     • NOSE SURGERY             Current Outpatient Medications:   •  acetaminophen (TYLENOL) 500 MG tablet, Take 1 tablet by mouth Every 4 (Four) Hours As Needed for Fever (fever greater than 101.5 F or headache)., Disp: , Rfl:   •  aspirin 81 MG EC tablet, Take 1 tablet by mouth Daily., Disp: , Rfl:   •  clopidogrel (PLAVIX) 75 MG tablet, TAKE 1 TABLET BY MOUTH DAILY, Disp: 30 tablet, Rfl: 5  •  metoprolol tartrate (LOPRESSOR) 25 MG tablet, TAKE 1/2 TABLET BY MOUTH EVERY 12 HOURS, Disp: 30 tablet, Rfl: 3  •  Multiple Vitamins-Minerals (MULTIVITAMIN ADULTS 50+ PO), Take 1 tablet by mouth Daily., Disp: , Rfl:   •  atorvastatin (LIPITOR) 40 MG tablet, Take 1 tablet by mouth Daily., Disp: 90 tablet, Rfl: 3      Social History     Socioeconomic History   • Marital status:    Tobacco Use   • Smoking status: Never   • Smokeless tobacco: Never   Vaping Use   • Vaping Use: Never used   Substance and Sexual Activity   • Alcohol use: No   • Drug use: Never   • Sexual activity: Yes     Partners: Female         Review of Systems   Constitutional: Negative for chills and fever.   HENT: Negative for ear discharge and nosebleeds.    Eyes: Negative for discharge and redness.   Cardiovascular: Positive for chest pain. Negative for orthopnea, palpitations,  "paroxysmal nocturnal dyspnea and syncope.   Respiratory: Positive for shortness of breath. Negative for cough and wheezing.    Endocrine: Negative for heat intolerance.   Skin: Negative for rash.   Musculoskeletal: Positive for arthritis, back pain and joint pain. Negative for myalgias.   Gastrointestinal: Negative for abdominal pain, melena, nausea and vomiting.   Genitourinary: Negative for dysuria and hematuria.   Neurological: Negative for dizziness, light-headedness, numbness and tremors.   Psychiatric/Behavioral: Negative for depression. The patient is not nervous/anxious.        Procedures      ECG 12 Lead    Date/Time: 3/15/2023 7:20 PM  Performed by: Félix Taylor MD  Authorized by: Félix Taylor MD   Comparison: compared with previous ECG   Similar to previous ECG  Rhythm: sinus rhythm  Other findings: non-specific ST-T wave changes    Clinical impression: normal ECG            ECG 12 Lead    (Results Pending)           Objective:    /72 (BP Location: Right arm, Patient Position: Sitting, Cuff Size: Large Adult)   Pulse 78   Ht 172.7 cm (67.99\")   Wt 86.2 kg (190 lb)   SpO2 99%   BMI 28.90 kg/m²         Constitutional:       Appearance: Well-developed.   Eyes:      General: No scleral icterus.        Right eye: No discharge.   HENT:      Head: Normocephalic and atraumatic.   Neck:      Thyroid: No thyromegaly.      Lymphadenopathy: No cervical adenopathy.   Pulmonary:      Effort: Pulmonary effort is normal. No respiratory distress.      Breath sounds: Normal breath sounds. No wheezing. No rales.   Cardiovascular:      Normal rate. Regular rhythm.      No gallop.   Abdominal:      Tenderness: There is no abdominal tenderness.   Skin:     Findings: No erythema or rash.   Neurological:      Mental Status: Alert and oriented to person, place, and time.             Assessment:       Diagnosis Plan   1. Coronary artery disease involving native coronary artery of native heart with unstable angina " pectoris (HCC)  ECG 12 Lead    atorvastatin (LIPITOR) 40 MG tablet      2. Bradycardia, sinus  ECG 12 Lead    atorvastatin (LIPITOR) 40 MG tablet      3. Short of breath on exertion  ECG 12 Lead    atorvastatin (LIPITOR) 40 MG tablet               Plan:       MDM:    1.  CAD:    Patient had CABG and doing well.  Continue current treatment    2.  Chest discomfort:    His chest pain is noncardiac.  I think it is due to hyperesthesia.  Recommend capsicin cream.  His stress test is negative    3.  Shortness of breath:    His symptoms are contained.  Echocardiogram is unremarkable.    4.  Hyperlipidemia:    Patient is started on Lipitor.  Repeat lipid panel testing.  I advised the patient to follow-up with PCP.

## 2023-03-15 ENCOUNTER — OFFICE VISIT (OUTPATIENT)
Dept: CARDIOLOGY | Facility: CLINIC | Age: 58
End: 2023-03-15
Payer: COMMERCIAL

## 2023-03-15 VITALS
DIASTOLIC BLOOD PRESSURE: 72 MMHG | HEIGHT: 68 IN | SYSTOLIC BLOOD PRESSURE: 112 MMHG | HEART RATE: 78 BPM | OXYGEN SATURATION: 99 % | WEIGHT: 190 LBS | BODY MASS INDEX: 28.79 KG/M2

## 2023-03-15 DIAGNOSIS — R00.1 BRADYCARDIA, SINUS: ICD-10-CM

## 2023-03-15 DIAGNOSIS — I25.110 CORONARY ARTERY DISEASE INVOLVING NATIVE CORONARY ARTERY OF NATIVE HEART WITH UNSTABLE ANGINA PECTORIS: Primary | ICD-10-CM

## 2023-03-15 DIAGNOSIS — R06.02 SHORT OF BREATH ON EXERTION: ICD-10-CM

## 2023-03-15 PROCEDURE — 99214 OFFICE O/P EST MOD 30 MIN: CPT | Performed by: INTERNAL MEDICINE

## 2023-03-15 PROCEDURE — 93000 ELECTROCARDIOGRAM COMPLETE: CPT | Performed by: INTERNAL MEDICINE

## 2023-03-15 RX ORDER — ATORVASTATIN CALCIUM 40 MG/1
40 TABLET, FILM COATED ORAL DAILY
Qty: 90 TABLET | Refills: 3 | Status: SHIPPED | OUTPATIENT
Start: 2023-03-15

## 2023-04-18 RX ORDER — CLOPIDOGREL BISULFATE 75 MG/1
75 TABLET ORAL DAILY
Qty: 30 TABLET | Refills: 5 | Status: SHIPPED | OUTPATIENT
Start: 2023-04-18

## 2023-10-10 ENCOUNTER — HOSPITAL ENCOUNTER (EMERGENCY)
Facility: HOSPITAL | Age: 58
Discharge: HOME | End: 2023-10-10
Attending: EMERGENCY MEDICINE
Payer: COMMERCIAL

## 2023-10-10 ENCOUNTER — APPOINTMENT (OUTPATIENT)
Dept: CT IMAGING | Facility: HOSPITAL | Age: 58
End: 2023-10-10
Payer: COMMERCIAL

## 2023-10-10 ENCOUNTER — APPOINTMENT (OUTPATIENT)
Dept: GENERAL RADIOLOGY | Facility: HOSPITAL | Age: 58
End: 2023-10-10
Payer: COMMERCIAL

## 2023-10-10 VITALS
HEIGHT: 68 IN | BODY MASS INDEX: 29.04 KG/M2 | OXYGEN SATURATION: 96 % | WEIGHT: 191.58 LBS | SYSTOLIC BLOOD PRESSURE: 100 MMHG | RESPIRATION RATE: 20 BRPM | TEMPERATURE: 97.8 F | DIASTOLIC BLOOD PRESSURE: 66 MMHG | HEART RATE: 58 BPM

## 2023-10-10 DIAGNOSIS — R07.9 CHEST PAIN, UNSPECIFIED TYPE: Primary | ICD-10-CM

## 2023-10-10 LAB
ALBUMIN SERPL-MCNC: 4.5 G/DL (ref 3.5–5.2)
ALBUMIN/GLOB SERPL: 1.7 G/DL
ALP SERPL-CCNC: 127 U/L (ref 39–117)
ALT SERPL W P-5'-P-CCNC: 29 U/L (ref 1–41)
ANION GAP SERPL CALCULATED.3IONS-SCNC: 9 MMOL/L (ref 5–15)
APTT PPP: 25.2 SECONDS (ref 61–76.5)
AST SERPL-CCNC: 34 U/L (ref 1–40)
BASOPHILS # BLD AUTO: 0 10*3/MM3 (ref 0–0.2)
BASOPHILS NFR BLD AUTO: 0.7 % (ref 0–1.5)
BILIRUB SERPL-MCNC: 0.8 MG/DL (ref 0–1.2)
BUN SERPL-MCNC: 15 MG/DL (ref 6–20)
BUN/CREAT SERPL: 14.4 (ref 7–25)
CALCIUM SPEC-SCNC: 9.4 MG/DL (ref 8.6–10.5)
CHLORIDE SERPL-SCNC: 103 MMOL/L (ref 98–107)
CO2 SERPL-SCNC: 28 MMOL/L (ref 22–29)
CREAT SERPL-MCNC: 1.04 MG/DL (ref 0.76–1.27)
D DIMER PPP FEU-MCNC: <0.19 MG/L (FEU) (ref 0–0.59)
DEPRECATED RDW RBC AUTO: 45.1 FL (ref 37–54)
EGFRCR SERPLBLD CKD-EPI 2021: 82.7 ML/MIN/1.73
EOSINOPHIL # BLD AUTO: 0.1 10*3/MM3 (ref 0–0.4)
EOSINOPHIL NFR BLD AUTO: 2.2 % (ref 0.3–6.2)
ERYTHROCYTE [DISTWIDTH] IN BLOOD BY AUTOMATED COUNT: 13.3 % (ref 12.3–15.4)
GEN 5 2HR TROPONIN T REFLEX: 7 NG/L
GLOBULIN UR ELPH-MCNC: 2.6 GM/DL
GLUCOSE SERPL-MCNC: 118 MG/DL (ref 65–99)
HCT VFR BLD AUTO: 44.5 % (ref 37.5–51)
HGB BLD-MCNC: 14.8 G/DL (ref 13–17.7)
HOLD SPECIMEN: NORMAL
HOLD SPECIMEN: NORMAL
INR PPP: 0.96 (ref 0.93–1.1)
LYMPHOCYTES # BLD AUTO: 1.9 10*3/MM3 (ref 0.7–3.1)
LYMPHOCYTES NFR BLD AUTO: 33.4 % (ref 19.6–45.3)
MAGNESIUM SERPL-MCNC: 2 MG/DL (ref 1.6–2.6)
MCH RBC QN AUTO: 32.9 PG (ref 26.6–33)
MCHC RBC AUTO-ENTMCNC: 33.2 G/DL (ref 31.5–35.7)
MCV RBC AUTO: 99.2 FL (ref 79–97)
MONOCYTES # BLD AUTO: 0.4 10*3/MM3 (ref 0.1–0.9)
MONOCYTES NFR BLD AUTO: 7.3 % (ref 5–12)
NEUTROPHILS NFR BLD AUTO: 3.2 10*3/MM3 (ref 1.7–7)
NEUTROPHILS NFR BLD AUTO: 56.4 % (ref 42.7–76)
NRBC BLD AUTO-RTO: 0.1 /100 WBC (ref 0–0.2)
PLATELET # BLD AUTO: 200 10*3/MM3 (ref 140–450)
PMV BLD AUTO: 7.5 FL (ref 6–12)
POTASSIUM SERPL-SCNC: 4 MMOL/L (ref 3.5–5.2)
PROT SERPL-MCNC: 7.1 G/DL (ref 6–8.5)
PROTHROMBIN TIME: 10.5 SECONDS (ref 9.6–11.7)
RBC # BLD AUTO: 4.49 10*6/MM3 (ref 4.14–5.8)
SODIUM SERPL-SCNC: 140 MMOL/L (ref 136–145)
TROPONIN T DELTA: -1 NG/L
TROPONIN T SERPL HS-MCNC: 8 NG/L
WBC NRBC COR # BLD: 5.8 10*3/MM3 (ref 3.4–10.8)
WHOLE BLOOD HOLD COAG: NORMAL
WHOLE BLOOD HOLD SPECIMEN: NORMAL

## 2023-10-10 PROCEDURE — 85025 COMPLETE CBC W/AUTO DIFF WBC: CPT | Performed by: EMERGENCY MEDICINE

## 2023-10-10 PROCEDURE — 36415 COLL VENOUS BLD VENIPUNCTURE: CPT

## 2023-10-10 PROCEDURE — 99284 EMERGENCY DEPT VISIT MOD MDM: CPT

## 2023-10-10 PROCEDURE — 85730 THROMBOPLASTIN TIME PARTIAL: CPT | Performed by: EMERGENCY MEDICINE

## 2023-10-10 PROCEDURE — 83735 ASSAY OF MAGNESIUM: CPT | Performed by: EMERGENCY MEDICINE

## 2023-10-10 PROCEDURE — 84484 ASSAY OF TROPONIN QUANT: CPT | Performed by: EMERGENCY MEDICINE

## 2023-10-10 PROCEDURE — 71045 X-RAY EXAM CHEST 1 VIEW: CPT

## 2023-10-10 PROCEDURE — 85379 FIBRIN DEGRADATION QUANT: CPT | Performed by: EMERGENCY MEDICINE

## 2023-10-10 PROCEDURE — 93005 ELECTROCARDIOGRAM TRACING: CPT | Performed by: EMERGENCY MEDICINE

## 2023-10-10 PROCEDURE — 85610 PROTHROMBIN TIME: CPT | Performed by: EMERGENCY MEDICINE

## 2023-10-10 PROCEDURE — 80053 COMPREHEN METABOLIC PANEL: CPT | Performed by: EMERGENCY MEDICINE

## 2023-10-10 PROCEDURE — 93005 ELECTROCARDIOGRAM TRACING: CPT

## 2023-10-10 PROCEDURE — 71250 CT THORAX DX C-: CPT

## 2023-10-10 RX ORDER — SODIUM CHLORIDE 0.9 % (FLUSH) 0.9 %
10 SYRINGE (ML) INJECTION AS NEEDED
Status: DISCONTINUED | OUTPATIENT
Start: 2023-10-10 | End: 2023-10-10 | Stop reason: HOSPADM

## 2023-10-10 RX ORDER — ISOSORBIDE MONONITRATE 30 MG/1
30 TABLET, EXTENDED RELEASE ORAL DAILY
Qty: 30 TABLET | Refills: 0 | Status: SHIPPED | OUTPATIENT
Start: 2023-10-10 | End: 2023-11-09

## 2023-10-10 RX ORDER — PANTOPRAZOLE SODIUM 20 MG/1
20 TABLET, DELAYED RELEASE ORAL DAILY
Qty: 14 TABLET | Refills: 0 | Status: SHIPPED | OUTPATIENT
Start: 2023-10-10 | End: 2023-10-24

## 2023-10-10 RX ADMIN — NITROGLYCERIN 1 INCH: 20 OINTMENT TOPICAL at 14:45

## 2023-10-10 NOTE — ED PROVIDER NOTES
Subjective   History of Present Illness  Chief complaint: Patient is a 57-year-old with a history of coronary artery disease.  He states that he is here with chest pain.  Has had it intermittently for the last week.  But severe today when it started again.  Midsternal nonradiating is the way he describes it.  He had bypass surgery 2 and half years ago.  He sees Dr. Taylor.  He had a negative stress test a year or so ago.  He states that he took an aspirin on top of his Plavix today.  It was a baby aspirin that he took.  His pain currently is a 6 out of 10.  He declines any pain medicine.  It was an 8 out of 10.  Made him feel short of breath.    Context:    Duration:    Timing:    Severity:    Associated Symptoms:        PCP:  LMP:      Review of Systems   Constitutional: Negative.    Respiratory:  Positive for shortness of breath.    Cardiovascular:  Positive for chest pain.   Gastrointestinal:  Negative for abdominal pain.   Genitourinary: Negative.    Musculoskeletal: Negative.        Past Medical History:   Diagnosis Date    Anxiety     Bradycardia     Coronary artery disease     Depression     Migraines     Mild cognitive impairment        Allergies   Allergen Reactions    Shellfish Allergy Anaphylaxis       Past Surgical History:   Procedure Laterality Date    CARDIAC CATHETERIZATION N/A 3/30/2021    Procedure: Left Heart Cath;  Surgeon: Félix Taylor MD;  Location: [facility] CATH INVASIVE LOCATION;  Service: Cardiovascular;  Laterality: N/A;    CHOLECYSTECTOMY      CORONARY ARTERY BYPASS GRAFT N/A 3/31/2021    Procedure: CORONARY ARTERY BYPASS GRAFTING;  Surgeon: Axel Smyth MD;  Location: [facility] CVOR;  Service: Cardiothoracic;  Laterality: N/A;    FOOT SURGERY      KNEE ARTHROSCOPY      NOSE SURGERY         Family History   Problem Relation Age of Onset    Heart attack Father     Heart disease Father        Social History     Socioeconomic History    Marital status:    Tobacco Use    Smoking status:  Never    Smokeless tobacco: Never   Vaping Use    Vaping Use: Never used   Substance and Sexual Activity    Alcohol use: No    Drug use: Never    Sexual activity: Yes     Partners: Female           Objective   Physical Exam  Vitals and nursing note reviewed.   Constitutional:       Appearance: He is well-developed.   HENT:      Head: Normocephalic and atraumatic.   Cardiovascular:      Rate and Rhythm: Normal rate and regular rhythm.      Heart sounds: Normal heart sounds.   Pulmonary:      Effort: Pulmonary effort is normal.      Breath sounds: Normal breath sounds.   Abdominal:      Palpations: Abdomen is soft.      Tenderness: There is no abdominal tenderness.   Skin:     General: Skin is warm and dry.   Neurological:      General: No focal deficit present.      Mental Status: He is alert and oriented to person, place, and time.   Psychiatric:         Mood and Affect: Mood normal.         Behavior: Behavior normal.         Procedures           ED Course                                           Medical Decision Making  Amount and/or Complexity of Data Reviewed  ECG/medicine tests: ordered.        Final diagnoses:   None       ED Disposition  ED Disposition       None            No follow-up provider specified.       Medication List      No changes were made to your prescriptions during this visit.          discharge along with signs and symptoms to return he was and agree plan all questions were answered. [AA]   1900 EKG shows sinus rhythm rate 73 probable left atrial enlargement previous EKG reviewed from 8/30/2022 without significant change [AA]      ED Course User Index  [AA] Anthony Vallejo PA  [LH] Derrick Mcclendon DO         No radiology results for the last day  CT Chest Without Contrast Diagnostic    Result Date: 10/10/2023  Impression: No CT explanation for patient's symptoms. Electronically Signed: Jerry Brown DO  10/10/2023 6:22 PM EDT  Workstation ID: [id number]    XR Chest 1 View    Result Date: 10/10/2023  Impression: No acute process. Electronically Signed: Mag Franks MD  10/10/2023 2:43 PM EDT  Workstation ID: [id number]         Results for orders placed or performed during the hospital encounter of 10/10/23   Comprehensive Metabolic Panel    Specimen: Blood   Result Value Ref Range    Glucose 118 (H) 65 - 99 mg/dL    BUN 15 6 - 20 mg/dL    Creatinine 1.04 0.76 - 1.27 mg/dL    Sodium 140 136 - 145 mmol/L    Potassium 4.0 3.5 - 5.2 mmol/L    Chloride 103 98 - 107 mmol/L    CO2 28.0 22.0 - 29.0 mmol/L    Calcium 9.4 8.6 - 10.5 mg/dL    Total Protein 7.1 6.0 - 8.5 g/dL    Albumin 4.5 3.5 - 5.2 g/dL    ALT (SGPT) 29 1 - 41 U/L    AST (SGOT) 34 1 - 40 U/L    Alkaline Phosphatase 127 (H) 39 - 117 U/L    Total Bilirubin 0.8 0.0 - 1.2 mg/dL    Globulin 2.6 gm/dL    A/G Ratio 1.7 g/dL    BUN/Creatinine Ratio 14.4 7.0 - 25.0    Anion Gap 9.0 5.0 - 15.0 mmol/L    eGFR 82.7 >60.0 mL/min/1.73   Protime-INR    Specimen: Blood   Result Value Ref Range    Protime 10.5 9.6 - 11.7 Seconds    INR 0.96 0.93 - 1.10   aPTT    Specimen: Blood   Result Value Ref Range    PTT 25.2 (L) 61.0 - 76.5 seconds   High Sensitivity Troponin T    Specimen: Blood   Result Value Ref Range    HS Troponin T 8 <15 ng/L   Magnesium    Specimen: Blood   Result Value Ref Range    Magnesium 2.0 1.6 - 2.6 mg/dL   CBC Auto  Differential    Specimen: Blood   Result Value Ref Range    WBC 5.80 3.40 - 10.80 10*3/mm3    RBC 4.49 4.14 - 5.80 10*6/mm3    Hemoglobin 14.8 13.0 - 17.7 g/dL    Hematocrit 44.5 37.5 - 51.0 %    MCV 99.2 (H) 79.0 - 97.0 fL    MCH 32.9 26.6 - 33.0 pg    MCHC 33.2 31.5 - 35.7 g/dL    RDW 13.3 12.3 - 15.4 %    RDW-SD 45.1 37.0 - 54.0 fl    MPV 7.5 6.0 - 12.0 fL    Platelets 200 140 - 450 10*3/mm3    Neutrophil % 56.4 42.7 - 76.0 %    Lymphocyte % 33.4 19.6 - 45.3 %    Monocyte % 7.3 5.0 - 12.0 %    Eosinophil % 2.2 0.3 - 6.2 %    Basophil % 0.7 0.0 - 1.5 %    Neutrophils, Absolute 3.20 1.70 - 7.00 10*3/mm3    Lymphocytes, Absolute 1.90 0.70 - 3.10 10*3/mm3    Monocytes, Absolute 0.40 0.10 - 0.90 10*3/mm3    Eosinophils, Absolute 0.10 0.00 - 0.40 10*3/mm3    Basophils, Absolute 0.00 0.00 - 0.20 10*3/mm3    nRBC 0.1 0.0 - 0.2 /100 WBC   D-dimer, Quantitative    Specimen: Blood   Result Value Ref Range    D-Dimer, Quantitative <0.19 0.00 - 0.59 mg/L (FEU)   High Sensitivity Troponin T 2Hr    Specimen: Arm, Left; Blood   Result Value Ref Range    HS Troponin T 7 <15 ng/L    Troponin T Delta -1 >=-4 - <+4 ng/L   ECG 12 Lead Chest Pain   Result Value Ref Range    QT Interval 375 ms    QTC Interval 414 ms   Green Top (Gel)   Result Value Ref Range    Extra Tube Hold for add-ons.    Lavender Top   Result Value Ref Range    Extra Tube hold for add-on    Gold Top - SST   Result Value Ref Range    Extra Tube Hold for add-ons.    Light Blue Top   Result Value Ref Range    Extra Tube Hold for add-ons.                                 Medical Decision Making  Patient was seen evaluated by problem    Differential diagnose includes but not limited to ACS, PE,    Patient did have blood testing here in the emergency department.  Was discussed with cardiology.  Dr. Taylor did evaluate the patient the bedside.  He did declined admission to the hospital.  Patient was transitioning care to physicians assistant.  This is pending cardiology  consultation and further evaluation    Problems Addressed:  Chest pain, unspecified type: complicated acute illness or injury    Amount and/or Complexity of Data Reviewed  Labs: ordered. Decision-making details documented in ED Course.     Details: Labs reviewed by myself  Radiology: ordered and independent interpretation performed.     Details: X-ray reviewed by myself  ECG/medicine tests: ordered.    Risk  Prescription drug management.        Final diagnoses:   None   Chest pain    ED Disposition  ED Disposition       None            No follow-up provider specified.       Medication List      No changes were made to your prescriptions during this visit.            Derrick Mcclendon DO  10/18/23 0031

## 2023-10-10 NOTE — DISCHARGE INSTRUCTIONS
Take medications as directed    Follow-up with Dr. Taylor's office to schedule heart cath outpatient.    Follow-up with your primary care provider in 3-5 days.  If you do not have a primary care provider call [phone] for help in finding one, or you may follow up with [facility] at [phone].    Return to ED for any new or worsening symptoms

## 2023-10-11 LAB
QT INTERVAL: 375 MS
QTC INTERVAL: 414 MS

## 2023-11-13 RX ORDER — CLOPIDOGREL BISULFATE 75 MG/1
75 TABLET ORAL DAILY
Qty: 30 TABLET | Refills: 5 | Status: SHIPPED | OUTPATIENT
Start: 2023-11-13

## 2024-03-04 RX ORDER — CLOPIDOGREL BISULFATE 75 MG/1
75 TABLET ORAL DAILY
Qty: 30 TABLET | Refills: 0 | Status: SHIPPED | OUTPATIENT
Start: 2024-03-04

## 2024-05-01 RX ORDER — CLOPIDOGREL BISULFATE 75 MG/1
75 TABLET ORAL DAILY
Qty: 30 TABLET | Refills: 0 | OUTPATIENT
Start: 2024-05-01

## 2024-10-23 RX ORDER — CLOPIDOGREL BISULFATE 75 MG/1
75 TABLET ORAL DAILY
Qty: 30 TABLET | Refills: 0 | OUTPATIENT
Start: 2024-10-23

## 2024-11-25 RX ORDER — METOPROLOL TARTRATE 25 MG/1
TABLET, FILM COATED ORAL
Qty: 30 TABLET | Refills: 3 | OUTPATIENT
Start: 2024-11-25

## (undated) DEVICE — TBG INSUFF MALE L/L W 12MM CON: Brand: MEDLINE INDUSTRIES, INC.

## (undated) DEVICE — TEMP PACING WIRE: Brand: MYO/WIRE

## (undated) DEVICE — GLV SURG BIOGEL LTX PF 8

## (undated) DEVICE — CATH IV INSYTE AUTOGARD SHLD 22G 1IN BK

## (undated) DEVICE — PK OPN HEART WHT WRP 50

## (undated) DEVICE — TUBING, SUCTION, 1/4" X 12', STRAIGHT: Brand: MEDLINE

## (undated) DEVICE — CATH DIAG IMPULSE FR4 6F 100CM

## (undated) DEVICE — PK TRY HEART CATH 50

## (undated) DEVICE — ACCESSRAIL PLATFORM (STANDARD BLADE): Brand: ACCESSRAIL PLATFORM (STANDARD BLADE)

## (undated) DEVICE — Device

## (undated) DEVICE — PRESSURE TUBING: Brand: TRUWAVE

## (undated) DEVICE — INTENDED FOR TISSUE SEPARATION, AND OTHER PROCEDURES THAT REQUIRE A SHARP SURGICAL BLADE TO PUNCTURE OR CUT.: Brand: BARD-PARKER ® CARBON RIB-BACK BLADES

## (undated) DEVICE — CATH DIAG IMPULSE FL4 6F 100CM

## (undated) DEVICE — STPCK 3WY W 14IN PRESS LN

## (undated) DEVICE — BLOOD TRANSFUSION FILTER: Brand: HAEMONETICS

## (undated) DEVICE — ANTIBACTERIAL UNDYED BRAIDED (POLYGLACTIN 910), SYNTHETIC ABSORBABLE SUTURE: Brand: COATED VICRYL

## (undated) DEVICE — SOL IRR NACL 0.9PCT BT 1000ML

## (undated) DEVICE — SUT PROLN 4/0 V7 36IN 8975H

## (undated) DEVICE — ST PERFUS M/

## (undated) DEVICE — CABL BIPOL W/ALLGTR CLIP/SM 12FT

## (undated) DEVICE — HEMOCONCENTRATOR PERFUS LPS06

## (undated) DEVICE — PINNACLE INTRODUCER SHEATH: Brand: PINNACLE

## (undated) DEVICE — PK ATS CUST W CARDIOTOMY RESEVOIR

## (undated) DEVICE — PK PERFUS CUST W/CARDIOPLEGIA

## (undated) DEVICE — GW PTFE EMERALD HEPCOAT FC J TIP STD .035 3MM 150CM

## (undated) DEVICE — SUT SILK 2 SUTUPAK TIE 60IN SA8H 2STRAND

## (undated) DEVICE — SUT SILK 0 CT1 18IN 424H

## (undated) DEVICE — SUT PROLN 6/0 RB2 D/A 30IN 8711H

## (undated) DEVICE — BLD SCLPL BEAVR MINI STR 2BVL 180D LF

## (undated) DEVICE — 28 FR STRAIGHT – SOFT PVC CATHETER: Brand: PVC THORACIC CATHETERS

## (undated) DEVICE — ELECTRD DEFIB M/FUNC PROPADZ STRL 2PK

## (undated) DEVICE — SUT PROLN 5/0 V5 36IN 8934H

## (undated) DEVICE — SUT VIC COAT PLS ANTIBAC TP1 27IN

## (undated) DEVICE — SUT PDS 0 CT-1 Z340H

## (undated) DEVICE — CORONARY ARTERY BYPASS GRAFT MARKERS, STAINLESS STEEL, DISTAL, WITHOUT HOLDER: Brand: ANASTOMARK CORONARY ARTERY BYPASS GRAFT MARKERS, STAINLESS STEEL, DISTAL

## (undated) DEVICE — BNDG ELAS ELITE V/CLOSE 4IN 5YD LF STRL

## (undated) DEVICE — NDL PERC 1PRT THNWALL W/BASEPLT 18G 7CM

## (undated) DEVICE — CANN RETRGR STYLET RSCP 15F

## (undated) DEVICE — SENSR CERBRL O2 PK/2

## (undated) DEVICE — ROTATING SURGICAL PUNCHES, 1 PER POUCH: Brand: A&E MEDICAL / ROTATING SURGICAL PUNCHES

## (undated) DEVICE — SOL NACL 0.9PCT 1000ML

## (undated) DEVICE — TOWEL,OR,DSP,ST,WHITE,DLX,4/PK,20PK/CS: Brand: MEDLINE

## (undated) DEVICE — SKIN AFFIX SURG ADHESIVE 72/CS 0.55ML: Brand: MEDLINE

## (undated) DEVICE — SUT PROLN 7/0 BV1 D/A 30IN 8703H

## (undated) DEVICE — SUT SILK 0 CT1 CR8 18IN C021D

## (undated) DEVICE — BG BLD SYS

## (undated) DEVICE — SUT SILK 2/0 TIES 18IN A185H

## (undated) DEVICE — SUCTION CANISTER, 1500CC,SAFELINER: Brand: DEROYAL

## (undated) DEVICE — SUT SILK 4/0 TIES 18IN A183H

## (undated) DEVICE — ELECTRD BLD EZ CLN STD 6.5IN

## (undated) DEVICE — CONNECT Y INTERSEPT W/LL 3/8 X 3/8 X 3/8IN

## (undated) DEVICE — CANN AORT ROOT DLP VNT/8IN 14G 7F

## (undated) DEVICE — BLOWER MISTER CLEARVIEW W/TBG

## (undated) DEVICE — SUT PROLN 4/0 V5 36IN 8935H

## (undated) DEVICE — BNDG ELAS ELITE V/CLOSE 6IN 5YD LF STRL

## (undated) DEVICE — CATH DIAG IMPULSE PIG .056 6F 110CM

## (undated) DEVICE — SCANLAN® VASCU-STATT® II SINGLE-USE BULLDOG CLAMP W/FIRMER CLAMPING PRESS - MIDI ANGLED 45° (YELLOW), CLAMPING PRESSURE 75-80 G (2/STERILE PKG): Brand: SCANLAN® VASCU-STATT® II SINGLE-USE BULLDOG CLAMP W/FIRMER CLAMPING PRESS

## (undated) DEVICE — SOL IRR H2O BTL 1000ML STRL

## (undated) DEVICE — GAUZE,SPONGE,4"X4",32PLY,XRAY,STRL,LF: Brand: MEDLINE

## (undated) DEVICE — SUT PROLN 3/0 V7 D/A 36IN 8976H

## (undated) DEVICE — SUT PROLENE PP 7/0 BV175-6 24IN

## (undated) DEVICE — SYS VASOVIEW HEMOPRO ENDOSCOPIC HARVST VESL

## (undated) DEVICE — SYS PERFUS SEP PLATLT W TIPS CUST